# Patient Record
Sex: FEMALE | Race: WHITE | NOT HISPANIC OR LATINO
[De-identification: names, ages, dates, MRNs, and addresses within clinical notes are randomized per-mention and may not be internally consistent; named-entity substitution may affect disease eponyms.]

---

## 2018-02-06 ENCOUNTER — APPOINTMENT (OUTPATIENT)
Dept: UROLOGY | Facility: CLINIC | Age: 73
End: 2018-02-06
Payer: MEDICARE

## 2018-02-06 VITALS
DIASTOLIC BLOOD PRESSURE: 78 MMHG | WEIGHT: 168 LBS | HEIGHT: 62 IN | SYSTOLIC BLOOD PRESSURE: 166 MMHG | BODY MASS INDEX: 30.91 KG/M2 | HEART RATE: 68 BPM

## 2018-02-06 DIAGNOSIS — K21.9 GASTRO-ESOPHAGEAL REFLUX DISEASE W/OUT ESOPHAGITIS: ICD-10-CM

## 2018-02-06 DIAGNOSIS — R31.29 OTHER MICROSCOPIC HEMATURIA: ICD-10-CM

## 2018-02-06 DIAGNOSIS — R35.0 FREQUENCY OF MICTURITION: ICD-10-CM

## 2018-02-06 DIAGNOSIS — Z78.9 OTHER SPECIFIED HEALTH STATUS: ICD-10-CM

## 2018-02-06 LAB
BILIRUB UR QL STRIP: NORMAL
CLARITY UR: CLEAR
COLLECTION METHOD: NORMAL
GLUCOSE UR-MCNC: NORMAL
HCG UR QL: NORMAL EU/DL
HGB UR QL STRIP.AUTO: NORMAL
KETONES UR-MCNC: NORMAL
LEUKOCYTE ESTERASE UR QL STRIP: 75
NITRITE UR QL STRIP: NORMAL
PH UR STRIP: 5
PROT UR STRIP-MCNC: NORMAL
SP GR UR STRIP: 1.01

## 2018-02-06 PROCEDURE — 81003 URINALYSIS AUTO W/O SCOPE: CPT | Mod: QW

## 2019-03-12 ENCOUNTER — APPOINTMENT (OUTPATIENT)
Dept: CARDIOLOGY | Facility: CLINIC | Age: 74
End: 2019-03-12

## 2019-03-12 VITALS
HEIGHT: 62 IN | BODY MASS INDEX: 33.13 KG/M2 | DIASTOLIC BLOOD PRESSURE: 76 MMHG | WEIGHT: 180 LBS | SYSTOLIC BLOOD PRESSURE: 150 MMHG | HEART RATE: 70 BPM

## 2019-03-12 RX ORDER — AMLODIPINE BESYLATE AND BENAZEPRIL HYDROCHLORIDE 10; 40 MG/1; MG/1
CAPSULE ORAL
Refills: 0 | Status: DISCONTINUED | COMMUNITY
End: 2019-03-12

## 2019-03-12 RX ORDER — ATENOLOL 50 MG/1
TABLET ORAL
Refills: 0 | Status: DISCONTINUED | COMMUNITY
End: 2019-03-12

## 2019-04-01 ENCOUNTER — APPOINTMENT (OUTPATIENT)
Dept: CARDIOLOGY | Facility: CLINIC | Age: 74
End: 2019-04-01
Payer: MEDICARE

## 2019-04-01 ENCOUNTER — APPOINTMENT (OUTPATIENT)
Dept: CARDIOLOGY | Facility: CLINIC | Age: 74
End: 2019-04-01

## 2019-04-01 PROCEDURE — 93306 TTE W/DOPPLER COMPLETE: CPT

## 2019-06-25 ENCOUNTER — APPOINTMENT (OUTPATIENT)
Dept: CARDIOLOGY | Facility: CLINIC | Age: 74
End: 2019-06-25
Payer: MEDICARE

## 2019-06-25 VITALS
BODY MASS INDEX: 33.13 KG/M2 | HEIGHT: 62 IN | HEART RATE: 66 BPM | WEIGHT: 180 LBS | DIASTOLIC BLOOD PRESSURE: 66 MMHG | SYSTOLIC BLOOD PRESSURE: 130 MMHG

## 2019-06-25 PROCEDURE — 93000 ELECTROCARDIOGRAM COMPLETE: CPT

## 2019-06-25 PROCEDURE — 99213 OFFICE O/P EST LOW 20 MIN: CPT

## 2019-07-01 ENCOUNTER — RX RENEWAL (OUTPATIENT)
Age: 74
End: 2019-07-01

## 2019-07-03 NOTE — ADDENDUM
[FreeTextEntry1] : Scheduled for EGD.\par \par Low risk patient for perioperative cardiac events (RCRI = 0).\par Low risk procedure.\par No cardiac decompensation.  Functional capacity > 4 METS without angina.\par Recent reassuring ECHO as above.\par \par No further cardiac testing required prior to EGD.\par Cont Bisoprolol and antihypertensives perioperatively.

## 2019-07-03 NOTE — DISCUSSION/SUMMARY
[FreeTextEntry1] : Cont Amlodipine-Benazepril.\par Cont Bisoprolol.\par Cont HCTZ.\par Weight loss / exercise.\par Follow-up 6-months.

## 2019-07-03 NOTE — REASON FOR VISIT
[Follow-Up - Clinic] : a clinic follow-up of [Hypertension] : hypertension [FreeTextEntry1] : Feels well.\par \par No angina.  Breathing comfortable.  No palpitations, lightheadedness, syncope.\par \par ECHO (4/1/19): nL LVSF.  No valve disease.\par \par Labs (4/10/19):\par \par CBC / CMP otherwise unremarkable.\par   HDL 58

## 2019-12-03 ENCOUNTER — APPOINTMENT (OUTPATIENT)
Dept: CARDIOLOGY | Facility: CLINIC | Age: 74
End: 2019-12-03

## 2020-02-11 ENCOUNTER — APPOINTMENT (OUTPATIENT)
Dept: CARDIOLOGY | Facility: CLINIC | Age: 75
End: 2020-02-11
Payer: MEDICARE

## 2020-02-11 VITALS
DIASTOLIC BLOOD PRESSURE: 70 MMHG | WEIGHT: 182 LBS | HEART RATE: 71 BPM | BODY MASS INDEX: 33.49 KG/M2 | HEIGHT: 62 IN | SYSTOLIC BLOOD PRESSURE: 126 MMHG

## 2020-02-11 PROCEDURE — 99213 OFFICE O/P EST LOW 20 MIN: CPT

## 2020-02-11 PROCEDURE — 93000 ELECTROCARDIOGRAM COMPLETE: CPT

## 2020-02-17 NOTE — REASON FOR VISIT
[Hypertension] : hypertension [Follow-Up - Clinic] : a clinic follow-up of [FreeTextEntry1] : Feels well.\par \par No angina.  Breathing comfortable.  No palpitations, lightheadedness, syncope.\par \par Had EGD.  Acid Rx adjusted.  Following with GI.\par \par Labs reviewed (1/14/20):\par \par Cr 1.04\par     HDL 52\par CBC / CMP otherwise unremarkable.

## 2020-05-18 ENCOUNTER — TRANSCRIPTION ENCOUNTER (OUTPATIENT)
Age: 75
End: 2020-05-18

## 2020-08-11 ENCOUNTER — APPOINTMENT (OUTPATIENT)
Dept: CARDIOLOGY | Facility: CLINIC | Age: 75
End: 2020-08-11

## 2020-11-02 ENCOUNTER — INPATIENT (INPATIENT)
Facility: HOSPITAL | Age: 75
LOS: 2 days | Discharge: HOME HEALTH | End: 2020-11-05
Attending: INTERNAL MEDICINE | Admitting: INTERNAL MEDICINE
Payer: MEDICARE

## 2020-11-02 VITALS
DIASTOLIC BLOOD PRESSURE: 89 MMHG | OXYGEN SATURATION: 97 % | SYSTOLIC BLOOD PRESSURE: 192 MMHG | HEART RATE: 119 BPM | TEMPERATURE: 97 F | WEIGHT: 175.05 LBS | RESPIRATION RATE: 24 BRPM | HEIGHT: 62 IN

## 2020-11-02 LAB
ALBUMIN SERPL ELPH-MCNC: 4.1 G/DL — SIGNIFICANT CHANGE UP (ref 3.5–5.2)
ALP SERPL-CCNC: 67 U/L — SIGNIFICANT CHANGE UP (ref 30–115)
ALT FLD-CCNC: 38 U/L — SIGNIFICANT CHANGE UP (ref 0–41)
ANION GAP SERPL CALC-SCNC: 12 MMOL/L — SIGNIFICANT CHANGE UP (ref 7–14)
AST SERPL-CCNC: 46 U/L — HIGH (ref 0–41)
BASE EXCESS BLDV CALC-SCNC: -1.3 MMOL/L — SIGNIFICANT CHANGE UP (ref -2–2)
BASOPHILS # BLD AUTO: 0.07 K/UL — SIGNIFICANT CHANGE UP (ref 0–0.2)
BASOPHILS NFR BLD AUTO: 0.5 % — SIGNIFICANT CHANGE UP (ref 0–1)
BILIRUB SERPL-MCNC: 0.4 MG/DL — SIGNIFICANT CHANGE UP (ref 0.2–1.2)
BUN SERPL-MCNC: 24 MG/DL — HIGH (ref 10–20)
CA-I SERPL-SCNC: 1.26 MMOL/L — SIGNIFICANT CHANGE UP (ref 1.12–1.3)
CALCIUM SERPL-MCNC: 9.6 MG/DL — SIGNIFICANT CHANGE UP (ref 8.5–10.1)
CHLORIDE SERPL-SCNC: 102 MMOL/L — SIGNIFICANT CHANGE UP (ref 98–110)
CK MB CFR SERPL CALC: 2.8 NG/ML — SIGNIFICANT CHANGE UP (ref 0.6–6.3)
CK SERPL-CCNC: 35 U/L — SIGNIFICANT CHANGE UP (ref 0–225)
CO2 SERPL-SCNC: 23 MMOL/L — SIGNIFICANT CHANGE UP (ref 17–32)
CREAT SERPL-MCNC: 0.9 MG/DL — SIGNIFICANT CHANGE UP (ref 0.7–1.5)
D DIMER BLD IA.RAPID-MCNC: 4493 NG/ML DDU — HIGH (ref 0–230)
EOSINOPHIL # BLD AUTO: 0.07 K/UL — SIGNIFICANT CHANGE UP (ref 0–0.7)
EOSINOPHIL NFR BLD AUTO: 0.5 % — SIGNIFICANT CHANGE UP (ref 0–8)
GAS PNL BLDV: 143 MMOL/L — SIGNIFICANT CHANGE UP (ref 136–145)
GAS PNL BLDV: SIGNIFICANT CHANGE UP
GAS PNL BLDV: SIGNIFICANT CHANGE UP
GLUCOSE SERPL-MCNC: 203 MG/DL — HIGH (ref 70–99)
HCO3 BLDV-SCNC: 26 MMOL/L — SIGNIFICANT CHANGE UP (ref 22–29)
HCT VFR BLD CALC: 44.9 % — SIGNIFICANT CHANGE UP (ref 37–47)
HCT VFR BLDA CALC: 46.1 % — HIGH (ref 34–44)
HGB BLD CALC-MCNC: 15 G/DL — SIGNIFICANT CHANGE UP (ref 14–18)
HGB BLD-MCNC: 14 G/DL — SIGNIFICANT CHANGE UP (ref 12–16)
IMM GRANULOCYTES NFR BLD AUTO: 2.9 % — HIGH (ref 0.1–0.3)
LACTATE BLDV-MCNC: 3.6 MMOL/L — HIGH (ref 0.5–1.6)
LACTATE SERPL-SCNC: 2.3 MMOL/L — HIGH (ref 0.7–2)
LYMPHOCYTES # BLD AUTO: 1.68 K/UL — SIGNIFICANT CHANGE UP (ref 1.2–3.4)
LYMPHOCYTES # BLD AUTO: 13.1 % — LOW (ref 20.5–51.1)
MCHC RBC-ENTMCNC: 28.2 PG — SIGNIFICANT CHANGE UP (ref 27–31)
MCHC RBC-ENTMCNC: 31.2 G/DL — LOW (ref 32–37)
MCV RBC AUTO: 90.5 FL — SIGNIFICANT CHANGE UP (ref 81–99)
MONOCYTES # BLD AUTO: 0.53 K/UL — SIGNIFICANT CHANGE UP (ref 0.1–0.6)
MONOCYTES NFR BLD AUTO: 4.1 % — SIGNIFICANT CHANGE UP (ref 1.7–9.3)
NEUTROPHILS # BLD AUTO: 10.11 K/UL — HIGH (ref 1.4–6.5)
NEUTROPHILS NFR BLD AUTO: 78.9 % — HIGH (ref 42.2–75.2)
NRBC # BLD: 0 /100 WBCS — SIGNIFICANT CHANGE UP (ref 0–0)
NT-PROBNP SERPL-SCNC: 193 PG/ML — SIGNIFICANT CHANGE UP (ref 0–300)
NT-PROBNP SERPL-SCNC: 806 PG/ML — HIGH (ref 0–300)
PCO2 BLDV: 53 MMHG — HIGH (ref 41–51)
PH BLDV: 7.3 — SIGNIFICANT CHANGE UP (ref 7.26–7.43)
PLATELET # BLD AUTO: 198 K/UL — SIGNIFICANT CHANGE UP (ref 130–400)
PO2 BLDV: 30 MMHG — SIGNIFICANT CHANGE UP (ref 20–40)
POTASSIUM BLDV-SCNC: 3.4 MMOL/L — SIGNIFICANT CHANGE UP (ref 3.3–5.6)
POTASSIUM SERPL-MCNC: 5.1 MMOL/L — HIGH (ref 3.5–5)
POTASSIUM SERPL-SCNC: 5.1 MMOL/L — HIGH (ref 3.5–5)
PROT SERPL-MCNC: 7.1 G/DL — SIGNIFICANT CHANGE UP (ref 6–8)
RAPID RVP RESULT: SIGNIFICANT CHANGE UP
RBC # BLD: 4.96 M/UL — SIGNIFICANT CHANGE UP (ref 4.2–5.4)
RBC # FLD: 15.4 % — HIGH (ref 11.5–14.5)
SAO2 % BLDV: 45 % — SIGNIFICANT CHANGE UP
SARS-COV-2 RNA SPEC QL NAA+PROBE: SIGNIFICANT CHANGE UP
SODIUM SERPL-SCNC: 137 MMOL/L — SIGNIFICANT CHANGE UP (ref 135–146)
TROPONIN T SERPL-MCNC: 0.02 NG/ML — HIGH
TROPONIN T SERPL-MCNC: 0.04 NG/ML — CRITICAL HIGH
TROPONIN T SERPL-MCNC: <0.01 NG/ML — SIGNIFICANT CHANGE UP
WBC # BLD: 12.83 K/UL — HIGH (ref 4.8–10.8)
WBC # FLD AUTO: 12.83 K/UL — HIGH (ref 4.8–10.8)

## 2020-11-02 PROCEDURE — 99497 ADVNCD CARE PLAN 30 MIN: CPT | Mod: 25

## 2020-11-02 PROCEDURE — 99223 1ST HOSP IP/OBS HIGH 75: CPT

## 2020-11-02 PROCEDURE — 71045 X-RAY EXAM CHEST 1 VIEW: CPT | Mod: 26

## 2020-11-02 PROCEDURE — 99291 CRITICAL CARE FIRST HOUR: CPT | Mod: GC

## 2020-11-02 PROCEDURE — 93010 ELECTROCARDIOGRAM REPORT: CPT | Mod: 77

## 2020-11-02 PROCEDURE — 93010 ELECTROCARDIOGRAM REPORT: CPT | Mod: 76

## 2020-11-02 RX ORDER — LIPASE/PROTEASE/AMYLASE 16-48-48K
2 CAPSULE,DELAYED RELEASE (ENTERIC COATED) ORAL
Refills: 0 | Status: DISCONTINUED | OUTPATIENT
Start: 2020-11-02 | End: 2020-11-02

## 2020-11-02 RX ORDER — ENOXAPARIN SODIUM 100 MG/ML
40 INJECTION SUBCUTANEOUS DAILY
Refills: 0 | Status: DISCONTINUED | OUTPATIENT
Start: 2020-11-02 | End: 2020-11-02

## 2020-11-02 RX ORDER — ENOXAPARIN SODIUM 100 MG/ML
80 INJECTION SUBCUTANEOUS
Refills: 0 | Status: DISCONTINUED | OUTPATIENT
Start: 2020-11-03 | End: 2020-11-03

## 2020-11-02 RX ORDER — FUROSEMIDE 40 MG
40 TABLET ORAL ONCE
Refills: 0 | Status: COMPLETED | OUTPATIENT
Start: 2020-11-02 | End: 2020-11-02

## 2020-11-02 RX ORDER — CHLORHEXIDINE GLUCONATE 213 G/1000ML
1 SOLUTION TOPICAL
Refills: 0 | Status: DISCONTINUED | OUTPATIENT
Start: 2020-11-02 | End: 2020-11-05

## 2020-11-02 RX ORDER — LANSOPRAZOLE 15 MG/1
15 CAPSULE, DELAYED RELEASE ORAL DAILY
Refills: 0 | Status: DISCONTINUED | OUTPATIENT
Start: 2020-11-02 | End: 2020-11-02

## 2020-11-02 RX ORDER — ENOXAPARIN SODIUM 100 MG/ML
40 INJECTION SUBCUTANEOUS ONCE
Refills: 0 | Status: COMPLETED | OUTPATIENT
Start: 2020-11-02 | End: 2020-11-02

## 2020-11-02 RX ORDER — LIPASE/PROTEASE/AMYLASE 16-48-48K
6 CAPSULE,DELAYED RELEASE (ENTERIC COATED) ORAL
Refills: 0 | Status: DISCONTINUED | OUTPATIENT
Start: 2020-11-02 | End: 2020-11-05

## 2020-11-02 RX ORDER — FUROSEMIDE 40 MG
40 TABLET ORAL
Refills: 0 | Status: DISCONTINUED | OUTPATIENT
Start: 2020-11-02 | End: 2020-11-03

## 2020-11-02 RX ORDER — AMLODIPINE BESYLATE 2.5 MG/1
5 TABLET ORAL DAILY
Refills: 0 | Status: DISCONTINUED | OUTPATIENT
Start: 2020-11-02 | End: 2020-11-05

## 2020-11-02 RX ORDER — PANTOPRAZOLE SODIUM 20 MG/1
40 TABLET, DELAYED RELEASE ORAL
Refills: 0 | Status: DISCONTINUED | OUTPATIENT
Start: 2020-11-02 | End: 2020-11-05

## 2020-11-02 RX ORDER — ALPRAZOLAM 0.25 MG
0.25 TABLET ORAL DAILY
Refills: 0 | Status: DISCONTINUED | OUTPATIENT
Start: 2020-11-02 | End: 2020-11-05

## 2020-11-02 RX ORDER — METOPROLOL TARTRATE 50 MG
25 TABLET ORAL DAILY
Refills: 0 | Status: DISCONTINUED | OUTPATIENT
Start: 2020-11-02 | End: 2020-11-05

## 2020-11-02 RX ORDER — LISINOPRIL 2.5 MG/1
20 TABLET ORAL DAILY
Refills: 0 | Status: DISCONTINUED | OUTPATIENT
Start: 2020-11-02 | End: 2020-11-05

## 2020-11-02 RX ADMIN — Medication 0.25 MILLIGRAM(S): at 21:20

## 2020-11-02 RX ADMIN — Medication 25 MILLIGRAM(S): at 17:15

## 2020-11-02 RX ADMIN — Medication 40 MILLIGRAM(S): at 10:39

## 2020-11-02 RX ADMIN — Medication 6 CAPSULE(S): at 18:26

## 2020-11-02 RX ADMIN — Medication 40 MILLIGRAM(S): at 17:14

## 2020-11-02 RX ADMIN — ENOXAPARIN SODIUM 40 MILLIGRAM(S): 100 INJECTION SUBCUTANEOUS at 17:14

## 2020-11-02 RX ADMIN — LISINOPRIL 20 MILLIGRAM(S): 2.5 TABLET ORAL at 17:15

## 2020-11-02 NOTE — ED PROVIDER NOTE - CARE PLAN
Principal Discharge DX:	Respiratory distress  Secondary Diagnosis:	Hypoxia  Secondary Diagnosis:	Fluid overload

## 2020-11-02 NOTE — H&P ADULT - ASSESSMENT
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  Pt. reports insidious inset progressively worsening SOB. No inciting factors noted. Pt. reports SOB worse on exertion and currently gets SOB walking up stairs or walking a quarter of a block. Pt. sleeps with two pillows secondary to GERD, no recent changes. Pt. denies chest pain, LE swelling, fever, cough. Pt. went to old cardiologist 3 days ago who prescribed oral lasix which did not improve syptoms. Today Pt. felt more SOB with increased work of breathing and called EMS. Of note Pt. missed morning lasix dose.  With EMS Pt. satting 80s improved to 97% on 3 L NC. In ED Pt. afebrile, , /89 chest xray with mild vascular congestion pending official read  trop neg, Lactate 3.8.   Pt. was placed n BiPAP and give 40mg IV lasix with significant improvement of symptoms. repeat HR 97 /82. Pt. admitted for further management.     #SOB -worse on exertion, vascular congestion on Xray responded to IV lasix, and improving with BiPAP likely new onset CHF unknown trigger. NO cough or fever doubt PNA, WBC 12 likely from chronic steroids.    -s/p 40 mg lasix IV, will c/w lasix 40mg BID, currently on BiPAP - can switch to NC with O2 goal above 92%  -strict Is and Os, dailt weights  -TTE  - trop neg will trend, EKG sinus tachycardia, ddimer,   -f/u official xray read  -Dr. Haile consult    #Hx of autoimmune pancreatitis diagnosed August 2020  -currently on prednisone taper on 30 mg prednisone, in past Pt. did not tolerate decreasing dose and is planned for biologic in future with GI -can f/u out pt. If Pt. will remain on daily steroids will likely need ppx   -c/w creon    #HTN-c/w home meds switching bisoprolol for metoprolol succinate    #Gerd on prevacid    #DVT ppx- lovenox  #GI ppx- on prevacid

## 2020-11-02 NOTE — H&P ADULT - ATTENDING COMMENTS
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 Worse with exertion. Denies any CP, LE swelling, fevers/chills, recent travel or surgeries. When the EMS arrived the pt O2 was in the 80s, started on NC. In the ED, Chest X-Ray negative. BNP negative. Started on BiPAP and given IV LASix with minimal improvement in symptoms, deescalated to NC.     Physical exam shows pt in NAD. VSS, afebrile, not hypoxic on 3L NC. A&Ox3. Non-focal neuro exam. Muscle strength/sensation intact. CTA B/L with no W/C/R. RRR, no M/G/R. ABD is soft and non-tender, normoactive BSs; ostomy bag in place. LEs with trace pitting edema B/L/. No calf tenderness and homans is negative. No rashes. Labs and radiology as above.     Dyspnea, concern for possible PE, less likely new-onset HF. Spoke with radiology and ordered a STAT CTA-chest. Echo. TSH. Monitor daily weights, Is&Os, and diet/fluid restriction. Cardio consulted by day team. Supplemental O2 PRN. Restart home meds.     Hyperglycemia likely from daily steroids. A1c. FSs. Insulin PRN.     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 Restart home meds. DVT PPX. Inform PCP of pt's admission to hospital. My note supersedes the residents note.

## 2020-11-02 NOTE — ED PROVIDER NOTE - OBJECTIVE STATEMENT
75 y f, pmh of htn, ulcerative colitis, autoimmune pancreatitis, new onset CHF started on lasix three days ago, pw sob. Endorses having sob for past week. This morning, sob became very severe w increased work of breathing.  Pt is sating in high 80s in the ambulance and 97% on 3 L nasal cannula. Denies cough, fever, cp. Denies, n/v, dizziness, loc, abd pain.  Of note, pt. did not take her lasix this morning b/c she has an appointment for an echo today. 76yo F, pmh of htn, ulcerative colitis s/p total colectomy and ileostomy, autoimmune pancreatitis on prednisone 30mg, and LE edema started on lasix three days ago, pw sob. Endorses having sob for past ten days, progressively worsened today with increased FERRARI. This morning, sob became very severe w increased work of breathing.  Pt is sating in high 80s in the ambulance and 97% on 3 L nasal cannula. Denies cough, fever, cp. Denies, n/v, dizziness, loc, abd pain.  Of note, pt. did not take her lasix this morning b/c she had an appointment for an echo today. Former smoker. +Covid antibody a few months ago,  had covid and passed away.

## 2020-11-02 NOTE — H&P ADULT - NSHPREVIEWOFSYSTEMS_GEN_ALL_CORE
CONSTITUTIONAL: No weakness, fevers or chills  EYES/ENT: No visual changes;  No vertigo or throat pain   NECK: No pain or stiffness  RESPIRATORY: No cough, wheezing, hemoptysis; + shortness of breath  CARDIOVASCULAR: No chest pain or palpitations  GASTROINTESTINAL: No abdominal or epigastric pain. No nausea, vomiting, or hematemesis; No diarrhea or constipation. No melena or hematochezia.  GENITOURINARY: No dysuria, frequency or hematuria  NEUROLOGICAL: No numbness or weakness  SKIN: No itching, rashes

## 2020-11-02 NOTE — ED PROVIDER NOTE - NS ED ROS FT
Eyes:  No visual changes, eye pain or discharge.  ENMT:  No hearing changes, pain, no sore throat or runny nose, no difficulty swallowing  Cardiac:  No chest pain  Respiratory:  Endorses sob and increased work of breathing.   GI:  No nausea, vomiting, diarrhea or abdominal pain.  :  No dysuria, frequency or burning.  MS:  No myalgia, muscle weakness, joint pain or back pain.  Neuro:  No headache or weakness.  No LOC.  Skin:  No skin rash.   Endocrine: No history of thyroid disease or diabetes.

## 2020-11-02 NOTE — H&P ADULT - NSICDXPASTMEDICALHX_GEN_ALL_CORE_FT
PAST MEDICAL HISTORY:  Autoimmune pancreatitis     Chronic GERD     H/O ulcerative colitis     HTN (hypertension)

## 2020-11-02 NOTE — CONSULT NOTE ADULT - ASSESSMENT
IMPRESSION  - NSTEMI (trop trend .01 -->  .04 -->  .02;  no active chest pain;  likely demand from hypoxic rf)  - Elevated D-Dimer 4493, proBNP 193, relatively nl CXR  - Hypoxic RF (85% on ra on admission;  no effusion on xray,  however does have b/l basilar crackles on exam)      PLAN  - r/o pe + f/u CTA once completed  - check TTE  - can decrease lasix iv to 40mg once daily instead  - strict i/o and trend renal function.    - re-eval volume status and pulmonary status in daily and change to oral lasix upon improvement of exam findings and/or symptoms.   - cw a/c for now until CTA results.  (can dc a/c if no pe).    IMPRESSION  - NSTEMI (trop trend .01 -->  .04 -->  .02;  no active chest pain;  likely demand from hypoxic rf)  - Elevated D-Dimer 4493, proBNP 193, relatively nl CXR  - Hypoxic RF (85% on ra on admission;  no effusion on xray,  however does have b/l basilar crackles on exam)  - Acute B/L PE      PLAN  - r/o pe + f/u CTA once completed  - check TTE  - strict i/o and trend renal function.    - re-eval volume status and pulmonary status in daily and change to oral lasix upon improvement of exam findings and/or symptoms.   - cw a/c for now until CTA results.  (can dc a/c if no pe).   - ok to dc lasix.

## 2020-11-02 NOTE — CHART NOTE - NSCHARTNOTEFT_GEN_A_CORE
Patient noted to have increasing oxygen requirements with positive cardiac markers. She was given two doses of lasix with appropriate urine output. However, shortness of breath failed to diminish and she was still requiring supplemental oxygen. Given D-dimer of 4500, BNP increase from 200 to over 800 and troponin peak of 0.04 high suspicion of PE present. CTA ordered but unable to be obtained urgently due to numerous other urgent cases in line for CT scan.     Conversation had with the patient regarding the risks and benefits of treatment and the risks and benefits of not doing anything. Patient agreed to therapeutic anticoagulation.    Additional Lovenox 40mg ordered up for now to meet 1mg/kg dosing and will treat with lovenox 80mg BID starting in the AM. Patient noted to have increasing oxygen requirements with positive cardiac markers. She was given two doses of lasix with appropriate urine output. However, shortness of breath failed to diminish and she was still requiring supplemental oxygen. Given D-dimer of 4500, BNP increase from 200 to over 800 and troponin peak of 0.04 high suspicion of PE present. CTA ordered but unable to be obtained urgently due to numerous other urgent cases in line for CT scan.     Conversation had with the patient regarding the risks and benefits of treatment and the risks and benefits of not doing anything. Patient agreed to therapeutic anticoagulation.    Additional Lovenox 40mg ordered up for now to meet 1mg/kg dosing and will treat with lovenox 80mg BID starting in the AM.      Attending attestation:   -Agree with above. Pt understands risks vs benefits and elects to start on therapeutic lovenox prior to CTA-chest due to high suspicion of PE.

## 2020-11-02 NOTE — ED ADULT TRIAGE NOTE - CHIEF COMPLAINT QUOTE
1.5 weeks SOB started on lasix 20mg missed her dose, scheduled for ECHO today. initially 85% on room air. placed on 3L 97% 76 yo F BIBA after 1.5 weeks SOB, started on lasix 20mg missed her dose today, scheduled for ECHO today. initially 85% on room air. placed on 3L 97%. tachycardic and tachypneic upon arrival.

## 2020-11-02 NOTE — H&P ADULT - NSHPPHYSICALEXAM_GEN_ALL_CORE
GENERAL: NAD, speaks in full sentences, no signs of respiratory distress  HEAD:  Atraumatic, Normocephalic  EYES: EOMI, PERRLA, non-icteric, no injected sclera  NECK: Supple, No JVD  CHEST/LUNG: b/l crackles at bases; No accessory muscles used  HEART: Tachycardic No murmurs;   ABDOMEN: Soft, Nontender, ilieostomy with clean base  EXTREMITIES:  2+ Peripheral Pulses, No cyanosis or edema  PSYCH: AAOx3  NEUROLOGY: non-focal  SKIN: No rashes or lesions

## 2020-11-02 NOTE — ED PROVIDER NOTE - CLINICAL SUMMARY MEDICAL DECISION MAKING FREE TEXT BOX
74 Y/O F PMHX AS DOCUMENTED WITH 10 DAYS OF SOB. PT WITH PULMONARY EDEMA. CLINICALLY IMPROVED WITH DIURESIS AND NIPPV. CXR REVIEWED. ALL DIAGNOSTIC TESTING REVIEWED. PT ADMITTED TO MEDICINE.

## 2020-11-02 NOTE — CONSULT NOTE ADULT - SUBJECTIVE AND OBJECTIVE BOX
Outpt cardiologist:  Dr. Dong / Dr. 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  Pt. reports insidious inset progressively worsening SOB. No inciting factors noted. Pt. reports SOB worse on exertion and currently gets SOB walking up stairs or walking a quarter of a block. Pt. sleeps with two pillows secondary to GERD, no recent changes. Pt. denies chest pain, LE swelling, fever, cough. Pt. went to old cardiologist 3 days ago who prescribed oral lasix which did not improve symptoms Today Pt. felt more SOB with increased work of breathing and called EMS. Of note Pt. missed morning lasix dose.  With EMS Pt. satting 80s improved to 97% on 3 L NC. In ED Pt. afebrile, , /89 chest xray with mild vascular congestion pending official read  trop neg, Lactate 3.8.   Pt. was placed n BiPAP and give 40mg IV lasix with significant improvement of symptoms. repeat HR 97 /82. Pt. admitted for further management.  (2020 13:24)    ----  cardio additions to above:  - primary symptom of zaman started insidiously ~1.5 weeks prior to presentation.  always on exertion,  but now dyspneic upon less exertion than initially  - denied chest pain / pressure;  no noticeable le heaviness or edema  - occasional 'fluttering' sensation in chest.    - recently was prescribed lasix outpt few days ptp, without improvement in dyspneic symptoms.   - only medication change was addition of prednisone ~2mo ago for autoimmune pancreatitis.       PAST MEDICAL & SURGICAL HISTORY  HTN (hypertension)    Chronic GERD    Autoimmune pancreatitis    H/O ulcerative colitis        FAMILY HISTORY:  FAMILY HISTORY:      SOCIAL HISTORY:  Social History:  20 pack year smoking hx quit 40 years ago, no etoh (2020 13:24)      ALLERGIES:  Ceftin (Anaphylaxis)  Cipro (Anaphylaxis)  sulfa drugs (Rash)      MEDICATIONS:  ALPRAZolam 0.25 milliGRAM(s) Oral daily PRN  amLODIPine   Tablet 5 milliGRAM(s) Oral daily  chlorhexidine 4% Liquid 1 Application(s) Topical <User Schedule>  enoxaparin Injectable 40 milliGRAM(s) SubCutaneous once  furosemide   Injectable 40 milliGRAM(s) IV Push two times a day  lisinopril 20 milliGRAM(s) Oral daily  metoprolol succinate ER 25 milliGRAM(s) Oral daily  pancrelipase  (CREON 12,000 Lipase Units) 6 Capsule(s) Oral three times a day with meals  pantoprazole    Tablet 40 milliGRAM(s) Oral before breakfast  predniSONE   Tablet 30 milliGRAM(s) Oral daily      HOME MEDICATIONS:  Home Medications:  ALPRAZolam 0.25 mg oral tablet: 1 tab(s) orally once a day, As Needed (2020 14:08)  amlodipine-benazepril 5 mg-20 mg oral capsule: 1 cap(s) orally once a day (2020 14:05)  bisoprolol 10 mg oral tablet: 1 tab(s) orally once a day (2020 14:06)  Creon 36,000 units oral delayed release capsule: 2 tab(s) orally 3 times a day (with meals) (2020 14:07)  predniSONE: 30 milligram(s) orally once a day (2020 14:05)  Prevacid 15 mg oral delayed release capsule: 1 cap(s) orally once a day (2020 14:07)      VITALS:   T(F): 97.7 ( @ 22:33), Max: 98.1 ( @ 20:58)  HR: 84 ( @ 22:33) (80 - 119)  BP: 150/79 ( @ 22:33) (145/79 - 192/89)  BP(mean): 108 ( @ 22:33) (108 - 108)  RR: 20 ( @ 22:33) (18 - 24)  SpO2: 98% ( @ 22:33) (96% - 100%)    I&O's Summary    2020 07:01  -  2020 23:37  --------------------------------------------------------  IN: 0 mL / OUT: 600 mL / NET: -600 mL        REVIEW OF SYSTEMS:  CONSTITUTIONAL: No weakness, fevers or chills  HEENT: No visual changes, neck/ear pain  RESPIRATORY: No cough. +sob.   CARDIOVASCULAR: no cp / pressure. occasional palpitations  GASTROINTESTINAL: No abdominal pain. No nausea, vomiting, diarrhea   GENITOURINARY: No dysuria, frequency or hematuria  NEUROLOGICAL: No new focal deficits  SKIN: No new rashes    PHYSICAL EXAM:  General: Not in distress.  Non-toxic appearing.   HEENT: EOMI  Cardio: Regular rate and rhythm, S1, S2, no murmur  Pulm: B/L BS.  No wheezing . mild b/l basilar crackles.   Abdomen: Soft, non-tender, non-distended. Normoactive bowel sounds  Extremities: No pitting edema b/l   Neuro: A&O x3. No focal deficits    LABS:                        14.0   12.83 )-----------( 198      ( 2020 10:23 )             44.9         137  |  102  |  24<H>  ----------------------------<  203<H>  5.1<H>   |  23  |  0.9    Ca    9.6      2020 10:23    TPro  7.1  /  Alb  4.1  /  TBili  0.4  /  DBili  x   /  AST  46<H>  /  ALT  38  /  AlkPhos  67  11      Creatine Kinase, Serum: 35 U/L (20 @ 21:14)  Troponin T, Serum: 0.02 ng/mL <H> (20 @ 21:14)  Troponin T, Serum: 0.04 ng/mL <HH> (20 @ 16:46)  Lactate, Blood: 2.3 mmol/L <H> (20 @ 16:46)  Troponin T, Serum: <0.01 ng/mL (20 @ 10:23)    CARDIAC MARKERS ( 2020 21:14 )  x     / 0.02 ng/mL / 35 U/L / x     / 2.8 ng/mL  CARDIAC MARKERS ( 2020 16:46 )  x     / 0.04 ng/mL / x     / x     / x      CARDIAC MARKERS ( 2020 10:23 )  x     / <0.01 ng/mL / x     / x     / x            Troponin trend:    Serum Pro-Brain Natriuretic Peptide: 806 pg/mL (20 @ 21:14)  Serum Pro-Brain Natriuretic Peptide: 193 pg/mL (20 @ 10:23)          RADIOLOGY:  -CXR:  < from: Xray Chest 1 View-PORTABLE IMMEDIATE (20 @ 13:48) >  No radiographic evidence of acute cardiopulmonary disease.    < end of copied text >  -TTE:  2010:  ef >55%. mild lvh.   -CCTA:  -STRESS TEST:  -CATHETERIZATION:  -OTHER:  EC Lead ECG:   Ventricular Rate 80 BPM    Atrial Rate 80 BPM    P-R Interval 154 ms    QRS Duration 82 ms    Q-T Interval 398 ms    QTC Calculation(Bazett) 459 ms    P Axis 34 degrees    R Axis 29 degrees    T Axis 25 degrees    Diagnosis Line Normal sinus rhythm  Septal infarct , age undetermined  Abnormal ECG    Confirmed by AMAN SCHROEDER MD ([id number]) on 2020 9:10:52 PM ( @ 17:00)      TELEMETRY EVENTS:

## 2020-11-02 NOTE — ED PROVIDER NOTE - ATTENDING CONTRIBUTION TO CARE
I personally evaluated the patient. I reviewed the Resident’s or Physician Assistant’s note (as assigned above), and agree with the findings and plan except as documented in my note.   74 Y/O F HTN, UC S/P TOTAL COLECTOMY AND ILEOSTOMY, AUTOIMMUNE PANCREATITIS ON PREDNISONE DAILY, FORMER SMOKER, WITH 10 DAYS OF FERRARI, PROGRESSIVELY WORSENING. PT SEEN BY PMD 3 DAYS AGO AND PRESCRIBED LASIX WITHOUT RELIEF. NO COUGH. NO CP. NO FEVER, CHILLS. PT WITH COVID + AB. NO ABD PAIN, N/V/D. NORMAL URINATION. VITALS NOTED. ALERT OX3 +MILD RESP DISTRESS. + TACHYPNEA. + DYSPNEA WITH MINIMAL EXERTION. NCAT PERRL. EOMI. OP NORMAL. MMM. NECK SUPPLE. NO JVD. LUNGS WITH RHONCHI B/L. RRR. S1S2. ABD- SOFT NONTENDER. + B/L LEG EDEMA. CN 2-12 INTACT. NEURO EXAM NONFOCAL.

## 2020-11-02 NOTE — ED ADULT NURSE REASSESSMENT NOTE - NS ED NURSE REASSESS COMMENT FT1
Pt states she is feeling much better. Pt continues on BIPAP at this time without complaint. Pt no longer tachypneic. Pt urinating without difficulty; 600cc output. Continues on cardiac monitor and pulse ox. Comfort and safety maintained.

## 2020-11-02 NOTE — ED PROVIDER NOTE - PROGRESS NOTE DETAILS
Miranda- Patient improved after bipap/lasix. Increased work of breathing resolved. Afebrile, stable. Signed out to MAR. Miranda- Dr. Fernández is not admitting to the hospital yet, will admit to hospitalist.

## 2020-11-02 NOTE — H&P ADULT - NSHPLABSRESULTS_GEN_ALL_CORE
14.0   12.83 )-----------( 198      ( 02 Nov 2020 10:23 )             44.9     11-02    137  |  102  |  24<H>  ----------------------------<  203<H>  5.1<H>   |  23  |  0.9    Ca    9.6      02 Nov 2020 10:23    TPro  7.1  /  Alb  4.1  /  TBili  0.4  /  DBili  x   /  AST  46<H>  /  ALT  38  /  AlkPhos  67  11-02

## 2020-11-02 NOTE — ED ADULT NURSE NOTE - OBJECTIVE STATEMENT
Pt presents to ED with c/o shortness of breath. Pt states she was started on Lasix 4 days ago and did not take her dose this morning because she was going to an echocardiogram appointment and didn't want to urinate a lot. Pt denies fever, N/V/D, abdominal pain. Pt states she was never tested for COVID but has antibodies because her  passed away from COVID.

## 2020-11-02 NOTE — ED ADULT NURSE NOTE - CHIEF COMPLAINT QUOTE
74 yo F BIBA after 1.5 weeks SOB, started on lasix 20mg missed her dose today, scheduled for ECHO today. initially 85% on room air. placed on 3L 97%. tachycardic and tachypneic upon arrival.

## 2020-11-02 NOTE — H&P ADULT - HISTORY OF PRESENT ILLNESS
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  Pt. reports insideious inset progressivly worseing SOB. No incitnig factors nted. Pt. 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  Pt. reports insidious inset progressively worsening SOB. No inciting factors noted. Pt. reports SOB worse on exertion and currently gets SOB walking up stairs or walking a quarter of a block. Pt. sleeps with two pillows secondary to GERD, no recent changes. Pt. denies chest pain, LE swelling, fever, cough. Pt. went to old cardiologist 3 days ago who prescribed oral lasix which did not improve symptoms Today Pt. felt more SOB with increased work of breathing and called EMS. Of note Pt. missed morning lasix dose.  With EMS Pt. satting 80s improved to 97% on 3 L NC. In ED Pt. afebrile, , /89 chest xray with mild vascular congestion pending official read  trop neg, Lactate 3.8.   Pt. was placed n BiPAP and give 40mg IV lasix with significant improvement of symptoms. repeat HR 97 /82. Pt. admitted for further management.

## 2020-11-03 LAB
NT-PROBNP SERPL-SCNC: 819 PG/ML — HIGH (ref 0–300)
TROPONIN T SERPL-MCNC: <0.01 NG/ML — SIGNIFICANT CHANGE UP
TROPONIN T SERPL-MCNC: <0.01 NG/ML — SIGNIFICANT CHANGE UP

## 2020-11-03 PROCEDURE — 71275 CT ANGIOGRAPHY CHEST: CPT | Mod: 26

## 2020-11-03 RX ORDER — HEPARIN SODIUM 5000 [USP'U]/ML
800 INJECTION INTRAVENOUS; SUBCUTANEOUS
Qty: 25000 | Refills: 0 | Status: DISCONTINUED | OUTPATIENT
Start: 2020-11-03 | End: 2020-11-03

## 2020-11-03 RX ORDER — ENOXAPARIN SODIUM 100 MG/ML
80 INJECTION SUBCUTANEOUS EVERY 12 HOURS
Refills: 0 | Status: DISCONTINUED | OUTPATIENT
Start: 2020-11-03 | End: 2020-11-05

## 2020-11-03 RX ADMIN — PANTOPRAZOLE SODIUM 40 MILLIGRAM(S): 20 TABLET, DELAYED RELEASE ORAL at 07:03

## 2020-11-03 RX ADMIN — Medication 6 CAPSULE(S): at 13:17

## 2020-11-03 RX ADMIN — Medication 40 MILLIGRAM(S): at 05:18

## 2020-11-03 RX ADMIN — ENOXAPARIN SODIUM 80 MILLIGRAM(S): 100 INJECTION SUBCUTANEOUS at 17:58

## 2020-11-03 RX ADMIN — ENOXAPARIN SODIUM 40 MILLIGRAM(S): 100 INJECTION SUBCUTANEOUS at 01:16

## 2020-11-03 RX ADMIN — Medication 6 CAPSULE(S): at 08:17

## 2020-11-03 RX ADMIN — Medication 3 CAPSULE(S): at 17:59

## 2020-11-03 RX ADMIN — Medication 25 MILLIGRAM(S): at 05:18

## 2020-11-03 RX ADMIN — Medication 30 MILLIGRAM(S): at 05:17

## 2020-11-03 RX ADMIN — AMLODIPINE BESYLATE 5 MILLIGRAM(S): 2.5 TABLET ORAL at 05:18

## 2020-11-03 RX ADMIN — LISINOPRIL 20 MILLIGRAM(S): 2.5 TABLET ORAL at 05:18

## 2020-11-03 NOTE — PROGRESS NOTE ADULT - ASSESSMENT
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  In the ER further work up revealed Pulmonary Embolism with right sided heart strain. She is currently hemodynamically stable.    Dyspnea  - Initial scan shows Acute pulmonary emboli within the distal left and right main pulmonary arteries with additional lobar and segmental extension into all lung lobes. There was evidence of right heart strain; with RV LV ratio of 1.15. It shows lung ground glass attenuation, which may be attributed to pulmonary edema in the appropriate clinical setting.  - D-dimer 4500, BNP peak 900 and Troponin peak 0.04 on admission  - therapeutic lovenox  - IR aware of the patient; no urgent need for intervention. Dr. Murray and his team spoke to the patient and explained the plan from their perspective  - Will get Echocardiogram done and check lower extremity duplex to evaluate for clot burden    History of autoimmune pancreatitis (diagnosed August 2020)  -currently on prednisone taper on 30 mg prednisone. She did not tolerate decreasing dose and is planned for biologic in future with GI   -on creon    History of Hypertension  - Hemodynamically stable  - home meds switched from bisoprolol for metoprolol succinate    History of Gastroesophageal Refux  - on ppi    Full Code  DASH anti reflux diet  On lovenox  Comes from Home, Vent Unit for now

## 2020-11-03 NOTE — CONSULT NOTE ADULT - ASSESSMENT
IMPRESSION:    PE   HO autoimmune pancreatitis and UC   HO HTN     PLAN:    CNS: no depressants     HEENT: Oral care    PULMONARY:  HOB @ 45 degrees.  Aspiration precautions     CARDIOVASCULAR: I=O.  BP control.  ECHO  Hold Lasix     GI: GI prophylaxis.  Feeding.  Bowel regimen     RENAL:  Follow up lytes.  Correct as needed    INFECTIOUS DISEASE: Follow up cultures.     HEMATOLOGICAL:  DVT therapy with LMWH.  LE Duplex.  Will need Hypercoagulable state and age appropriate malignancy ZUNIGA as OP     ENDOCRINE:  Follow up FS.  Insulin protocol if needed.    MUSCULOSKELETAL:  Bed rest today     Possible transfer to floor pM          IMPRESSION:    PE   HO autoimmune pancreatitis and UC   Possible MEENA   HO HTN     PLAN:    CNS: no depressants     HEENT: Oral care    PULMONARY:  HOB @ 45 degrees.  Aspiration precautions     CARDIOVASCULAR: I=O.  BP control.  ECHO  Hold Lasix     GI: GI prophylaxis.  Feeding.  Bowel regimen     RENAL:  Follow up lytes.  Correct as needed    INFECTIOUS DISEASE: Follow up cultures.     HEMATOLOGICAL:  DVT therapy with LMWH.  LE Duplex.  Will need Hypercoagulable state and age appropriate malignancy ZUNIGA as OP     ENDOCRINE:  Follow up FS.  Insulin protocol if needed.    MUSCULOSKELETAL:  Bed rest today     Possible transfer to floor pM

## 2020-11-03 NOTE — CONSULT NOTE ADULT - SUBJECTIVE AND OBJECTIVE BOX
INTERVENTIONAL RADIOLOGY CONSULT:     Procedure Requested: Pulmonary thrombectomy    HPI:  75 year old female PMH of UC 1198 s/p total colectomy and ileostomy, Uterine Ca s/p hysterectomy HTN, GERD, autoimmune pancreatitis on daily steroids who presents for SOB.  Pt. reports insidious inset progressively worsening SOB. No inciting factors noted. Pt. reports SOB worse on exertion and currently gets SOB walking up stairs or walking a quarter of a block. Pt. sleeps with two pillows secondary to GERD, no recent changes. Pt. denies chest pain, LE swelling, fever, cough. Pt. went to old cardiologist 3 days ago who prescribed oral lasix which did not improve symptoms Today Pt. felt more SOB with increased work of breathing and called EMS. Of note Pt. missed morning lasix dose.  With EMS Pt. satting 80s improved to 97% on 3 L NC. In ED Pt. afebrile, , /89 chest xray with mild vascular congestion pending official read  trop neg, Lactate 3.8.   Pt. was placed n BiPAP and give 40mg IV lasix with significant improvement of symptoms. repeat HR 97 /82. Pt. admitted for further management.       PAST MEDICAL & SURGICAL HISTORY:  HTN (hypertension)  Chronic GERD  Autoimmune pancreatitis  H/O ulcerative colitis  Uterine CA, s/p hysterectomy    MEDICATIONS  (STANDING):  amLODIPine   Tablet 5 milliGRAM(s) Oral daily  chlorhexidine 4% Liquid 1 Application(s) Topical <User Schedule>  heparin  Infusion 800 Unit(s)/Hr (8 mL/Hr) IV Continuous <Continuous>  lisinopril 20 milliGRAM(s) Oral daily  metoprolol succinate ER 25 milliGRAM(s) Oral daily  pancrelipase  (CREON 12,000 Lipase Units) 6 Capsule(s) Oral three times a day with meals  pantoprazole    Tablet 40 milliGRAM(s) Oral before breakfast  predniSONE   Tablet 30 milliGRAM(s) Oral daily    MEDICATIONS  (PRN):  ALPRAZolam 0.25 milliGRAM(s) Oral daily PRN anxiety      Allergies:  Ceftin (Anaphylaxis)  Cipro (Anaphylaxis)  sulfa drugs (Rash)    Physical Exam:   Vital Signs Last 24 Hrs  T(C): 36.3 (03 Nov 2020 08:13), Max: 36.7 (02 Nov 2020 20:58)  T(F): 97.3 (03 Nov 2020 08:13), Max: 98.1 (02 Nov 2020 20:58)  HR: 76 (03 Nov 2020 08:13) (62 - 119)  BP: 143/79 (03 Nov 2020 08:13) (137/67 - 192/89)  BP(mean): 105 (03 Nov 2020 08:13) (98 - 108)  RR: 18 (03 Nov 2020 08:13) (16 - 24)  SpO2: 97% (03 Nov 2020 08:13) (96% - 100%)    Labs:                         14.0   12.83 )-----------( 198      ( 02 Nov 2020 10:23 )             44.9     11-02    137  |  102  |  24<H>  ----------------------------<  203<H>  5.1<H>   |  23  |  0.9    Ca    9.6      02 Nov 2020 10:23    TPro  7.1  /  Alb  4.1  /  TBili  0.4  /  DBili  x   /  AST  46<H>  /  ALT  38  /  AlkPhos  67  11-02        Pertinent labs:                      14.0   12.83 )-----------( 198      ( 02 Nov 2020 10:23 )             44.9       11-02    137  |  102  |  24<H>  ----------------------------<  203<H>  5.1<H>   |  23  |  0.9    Ca    9.6      02 Nov 2020 10:23    TPro  7.1  /  Alb  4.1  /  TBili  0.4  /  DBili  x   /  AST  46<H>  /  ALT  38  /  AlkPhos  67  11-02    Radiology & Additional Studies:   Radiology imaging reviewed.       ASSESSMENT/ PLAN:   76 yo F with pmhx of uterine CA s/p hysterectomy, autoimmune pancreatitis, and COVID pneumonia in February 2020. Patient presented with worsening SOB and was found to have bilateral distal main/proximal segmental pulmonary emboli which appeared subacute or chronic. Additionally, there was right heart strain. Troponins were 0.04, and subsequently down trended to 0.02. Patient was placed on 2L NC with significant improvement in respiratory status. She was started on heparin and admitted for further management. IR consulted for pulmonary thrombectomy.    Plan:  - Patient resting comfortably in bed with significant improvement in respiratory status  - Subacute/chronic appearing clot on CT  - Given patient's current status and appearance of emboli, recommend medical management for now  - Bilateral LE duplex  - Consider IVC filter if extensive LE clot burden  - Continue outpatient anticoagulation for at least 6 months    Risks, benefits, and alternatives to treatment discussed. All questions answered with understanding.    Thank you for the courtesy of this consult, please call [phone] with any further questions.    INTERVENTIONAL RADIOLOGY CONSULT:     Procedure Requested: Pulmonary thrombectomy    HPI:  75 year old female PMH of UC 1198 s/p total colectomy and ileostomy, Uterine Ca s/p hysterectomy HTN, GERD, autoimmune pancreatitis on daily steroids who presents for SOB.  Pt. reports insidious inset progressively worsening SOB. No inciting factors noted. Pt. reports SOB worse on exertion and currently gets SOB walking up stairs or walking a quarter of a block. Pt. sleeps with two pillows secondary to GERD, no recent changes. Pt. denies chest pain, LE swelling, fever, cough. Pt. went to old cardiologist 3 days ago who prescribed oral lasix which did not improve symptoms Today Pt. felt more SOB with increased work of breathing and called EMS. Of note Pt. missed morning lasix dose.  With EMS Pt. satting 80s improved to 97% on 3 L NC. In ED Pt. afebrile, , /89 chest xray with mild vascular congestion pending official read  trop neg, Lactate 3.8.   Pt. was placed n BiPAP and give 40mg IV lasix with significant improvement of symptoms. repeat HR 97 /82. Pt. admitted for further management.       PAST MEDICAL & SURGICAL HISTORY:  HTN (hypertension)  Chronic GERD  Autoimmune pancreatitis  H/O ulcerative colitis  Uterine CA, s/p hysterectomy    MEDICATIONS  (STANDING):  amLODIPine   Tablet 5 milliGRAM(s) Oral daily  chlorhexidine 4% Liquid 1 Application(s) Topical <User Schedule>  heparin  Infusion 800 Unit(s)/Hr (8 mL/Hr) IV Continuous <Continuous>  lisinopril 20 milliGRAM(s) Oral daily  metoprolol succinate ER 25 milliGRAM(s) Oral daily  pancrelipase  (CREON 12,000 Lipase Units) 6 Capsule(s) Oral three times a day with meals  pantoprazole    Tablet 40 milliGRAM(s) Oral before breakfast  predniSONE   Tablet 30 milliGRAM(s) Oral daily    MEDICATIONS  (PRN):  ALPRAZolam 0.25 milliGRAM(s) Oral daily PRN anxiety      Allergies:  Ceftin (Anaphylaxis)  Cipro (Anaphylaxis)  sulfa drugs (Rash)    Physical Exam:   Vital Signs Last 24 Hrs  T(C): 36.3 (03 Nov 2020 08:13), Max: 36.7 (02 Nov 2020 20:58)  T(F): 97.3 (03 Nov 2020 08:13), Max: 98.1 (02 Nov 2020 20:58)  HR: 76 (03 Nov 2020 08:13) (62 - 119)  BP: 143/79 (03 Nov 2020 08:13) (137/67 - 192/89)  BP(mean): 105 (03 Nov 2020 08:13) (98 - 108)  RR: 18 (03 Nov 2020 08:13) (16 - 24)  SpO2: 97% (03 Nov 2020 08:13) (96% - 100%)    Labs:                         14.0   12.83 )-----------( 198      ( 02 Nov 2020 10:23 )             44.9     11-02    137  |  102  |  24<H>  ----------------------------<  203<H>  5.1<H>   |  23  |  0.9    Ca    9.6      02 Nov 2020 10:23    TPro  7.1  /  Alb  4.1  /  TBili  0.4  /  DBili  x   /  AST  46<H>  /  ALT  38  /  AlkPhos  67  11-02        Pertinent labs:                      14.0   12.83 )-----------( 198      ( 02 Nov 2020 10:23 )             44.9       11-02    137  |  102  |  24<H>  ----------------------------<  203<H>  5.1<H>   |  23  |  0.9    Ca    9.6      02 Nov 2020 10:23    TPro  7.1  /  Alb  4.1  /  TBili  0.4  /  DBili  x   /  AST  46<H>  /  ALT  38  /  AlkPhos  67  11-02    Radiology & Additional Studies:   Radiology imaging reviewed.       ASSESSMENT/ PLAN:   74 yo F with pmhx of uterine CA s/p hysterectomy, autoimmune pancreatitis, and COVID pneumonia in February 2020. Patient presented with worsening SOB and was found to have bilateral distal main/proximal segmental pulmonary emboli which appeared subacute or chronic. Additionally, there was right heart strain. Troponins were 0.04, and subsequently down trended to 0.02. Patient was placed on 2L NC with significant improvement in respiratory status. She was started on heparin and admitted for further management. IR consulted for catheter directed treatement of pulmonary embolism.    Plan:  - Patient resting comfortably in bed with significant improvement in respiratory status( saturating 100% on 2L NC)  - Subacute/chronic distal appearing clot on CT  - Given patient's current status and appearance of emboli, recommend medical management for now  - Obtain official 2D echocardiogram to evaluate right heart function.   - Bilateral LE duplex  - Consider IVC filter placement if extensive LE clot burden  - Continue outpatient anticoagulation for at least 6 months    Risks, benefits, and alternatives to treatment discussed. All questions answered with understanding.    Thank you for the courtesy of this consult, please call [phone] with any further questions.

## 2020-11-03 NOTE — CONSULT NOTE ADULT - SUBJECTIVE AND OBJECTIVE BOX
Patient is a 75y old  Female who presents with a chief complaint of SOB (02 Nov 2020 23:36)      HPI:  75 year old female PMH of UC 1998 s/p total colectomy and ileostomy, Uterine Ca s/p hysterectomy HTN, GERD, autoimmune pancreatitis on daily steroids who presents for SOB.  Pt. reports insidious inset progressively worsening SOB. No inciting factors noted. Pt. reports SOB worse on exertion and currently gets SOB walking up stairs or walking a quarter of a block. Pt. sleeps with two pillows secondary to GERD, no recent changes. Pt. denies chest pain, LE swelling, fever, cough. Pt. went to old cardiologist 3 days ago who prescribed oral lasix which did not improve symptoms Today Pt. felt more SOB with increased work of breathing and called EMS. Of note Pt. missed morning lasix dose.  With EMS Pt. satting 80s improved to 97% on 3 L NC. In ED Pt. afebrile, , /89 chest xray with mild vascular congestion pending official read  trop neg, Lactate 3.8.   Pt. was placed n BiPAP and give 40mg IV lasix with significant improvement of symptoms. repeat HR 97 /82. Pt. admitted for further management.  (02 Nov 2020 13:24)      PAST MEDICAL & SURGICAL HISTORY:  HTN (hypertension)    Chronic GERD    Autoimmune pancreatitis    H/O ulcerative colitis        SOCIAL HX:   Smoking      no                   ETOH       occasional                      Other    FAMILY HISTORY:  :  No known cardiovacular family hisotry     Review Of Systems:     All ROS are negative except per HPI       Allergies    Ceftin (Anaphylaxis)  Cipro (Anaphylaxis)  sulfa drugs (Rash)    Intolerances          PHYSICAL EXAM    ICU Vital Signs Last 24 Hrs  T(C): 36.7 (02 Nov 2020 23:00), Max: 36.7 (02 Nov 2020 20:58)  T(F): 98 (02 Nov 2020 23:00), Max: 98.1 (02 Nov 2020 20:58)  HR: 62 (03 Nov 2020 07:04) (62 - 119)  BP: 162/79 (03 Nov 2020 07:04) (137/67 - 192/89)  BP(mean): 98 (03 Nov 2020 02:55) (98 - 108)  ABP: --  ABP(mean): --  RR: 18 (03 Nov 2020 07:04) (16 - 24)  SpO2: 97% (03 Nov 2020 07:04) (96% - 100%)      CONSTITUTIONAL:  Well nourished.  NAD    ENT:   Airway patent,   Mouth with normal mucosa.   No thrush    EYES:   pupils equal,   round and reactive to light.    CARDIAC:   Normal rate,   Regular rhythm.    Heart sounds S1, S2.   No edema      Vascular:   normal systolic impulse  no bruits    RESPIRATORY:   No wheezing   Normal chest expansion  No use of accessory muscles    GASTROINTESTINAL:  Abdomen soft   Non-tender,   No guarding,   + BS    GENITOURINARY  normal genitalia for sex  no edema    MUSCULOSKELETAL:   Range of motion is not limited,  Nno clubbing, cyanosis    NEUROLOGICAL:   Alert and oriented   No motor or sensory deficits.  Pertinent DTRs normal    SKIN:   Skin normal color for race,   Warm and dry  No evidence of rash.    PSYCHIATRIC:   Normal mood and affect.   No apparent risk to self or others.    HEME LYMPH:   No cervical  lymphadenopathy.  No inguinal lymphadenopathy            11-02-20 @ 07:01  -  11-03-20 @ 07:00  --------------------------------------------------------  IN:  Total IN: 0 mL    OUT:    Voided (mL): 600 mL  Total OUT: 600 mL    Total NET: -600 mL          LABS:                          14.0   12.83 )-----------( 198      ( 02 Nov 2020 10:23 )             44.9                                               11-02    137  |  102  |  24<H>  ----------------------------<  203<H>  5.1<H>   |  23  |  0.9    Ca    9.6      02 Nov 2020 10:23    TPro  7.1  /  Alb  4.1  /  TBili  0.4  /  DBili  x   /  AST  46<H>  /  ALT  38  /  AlkPhos  67  11-02                                                 CARDIAC MARKERS ( 03 Nov 2020 01:43 )  x     / <0.01 ng/mL / x     / x     / x      CARDIAC MARKERS ( 02 Nov 2020 21:14 )  x     / 0.02 ng/mL / 35 U/L / x     / 2.8 ng/mL  CARDIAC MARKERS ( 02 Nov 2020 16:46 )  x     / 0.04 ng/mL / x     / x     / x      CARDIAC MARKERS ( 02 Nov 2020 10:23 )  x     / <0.01 ng/mL / x     / x     / x                                                LIVER FUNCTIONS - ( 02 Nov 2020 10:23 )  Alb: 4.1 g/dL / Pro: 7.1 g/dL / ALK PHOS: 67 U/L / ALT: 38 U/L / AST: 46 U/L / GGT: x                                                                                                                                       X-Rays reviewed:                                                                                    ECHO    CXR interpreted by me:    MEDICATIONS  (STANDING):  amLODIPine   Tablet 5 milliGRAM(s) Oral daily  chlorhexidine 4% Liquid 1 Application(s) Topical <User Schedule>  furosemide   Injectable 40 milliGRAM(s) IV Push two times a day  heparin  Infusion 800 Unit(s)/Hr (8 mL/Hr) IV Continuous <Continuous>  lisinopril 20 milliGRAM(s) Oral daily  metoprolol succinate ER 25 milliGRAM(s) Oral daily  pancrelipase  (CREON 12,000 Lipase Units) 6 Capsule(s) Oral three times a day with meals  pantoprazole    Tablet 40 milliGRAM(s) Oral before breakfast  predniSONE   Tablet 30 milliGRAM(s) Oral daily    MEDICATIONS  (PRN):  ALPRAZolam 0.25 milliGRAM(s) Oral daily PRN anxiety

## 2020-11-03 NOTE — PROGRESS NOTE ADULT - SUBJECTIVE AND OBJECTIVE BOX
SUBJECTIVE:    Currently admitted to medicine with the primary diagnosis of Pulmonary Embolism    Today is hospital day 1    PAST MEDICAL & SURGICAL HISTORY  HTN (hypertension)  Chronic GERD  Autoimmune pancreatitis  Ulcerative colitis    ALLERGIES:  Ceftin (Anaphylaxis)  Cipro (Anaphylaxis)  sulfa drugs (Rash)    MEDICATIONS:  STANDING MEDICATIONS  amLODIPine   Tablet 5 milliGRAM(s) Oral daily  chlorhexidine 4% Liquid 1 Application(s) Topical <User Schedule>  heparin  Infusion 800 Unit(s)/Hr IV Continuous <Continuous>  lisinopril 20 milliGRAM(s) Oral daily  metoprolol succinate ER 25 milliGRAM(s) Oral daily  pancrelipase  (CREON 12,000 Lipase Units) 6 Capsule(s) Oral three times a day with meals  pantoprazole    Tablet 40 milliGRAM(s) Oral before breakfast  predniSONE   Tablet 30 milliGRAM(s) Oral daily    PRN MEDICATIONS  ALPRAZolam 0.25 milliGRAM(s) Oral daily PRN    VITALS:   T(F): 97.3  HR: 76  BP: 143/79  RR: 18  SpO2: 97%    LABS:                        14.0   12.83 )-----------( 198      ( 02 Nov 2020 10:23 )             44.9     11-02    137  |  102  |  24<H>  ----------------------------<  203<H>  5.1<H>   |  23  |  0.9    Ca    9.6      02 Nov 2020 10:23    TPro  7.1  /  Alb  4.1  /  TBili  0.4  /  DBili  x   /  AST  46<H>  /  ALT  38  /  AlkPhos  67  11-02      Troponin T, Serum: <0.01 ng/mL (11-03-20 @ 01:43)  Creatine Kinase, Serum: 35 U/L (11-02-20 @ 21:14)  Troponin T, Serum: 0.02 ng/mL <H> (11-02-20 @ 21:14)  Troponin T, Serum: 0.04 ng/mL <HH> (11-02-20 @ 16:46)  Lactate, Blood: 2.3 mmol/L <H> (11-02-20 @ 16:46)  Troponin T, Serum: <0.01 ng/mL (11-02-20 @ 10:23)    CARDIAC MARKERS ( 03 Nov 2020 01:43 )  x     / <0.01 ng/mL / x     / x     / x      CARDIAC MARKERS ( 02 Nov 2020 21:14 )  x     / 0.02 ng/mL / 35 U/L / x     / 2.8 ng/mL  CARDIAC MARKERS ( 02 Nov 2020 16:46 )  x     / 0.04 ng/mL / x     / x     / x      CARDIAC MARKERS ( 02 Nov 2020 10:23 )  x     / <0.01 ng/mL / x     / x     / x          RADIOLOGY:    CT Angio Chest PE Protocol w/ IV Cont (11.03.20 @ 00:53) >    1.  Acute pulmonary emboli within the distal left and right main pulmonary arteries with additional lobar and segmental extension into all lung lobes.  2.  Evidence of right heart strain; with RV LV ratio of 1.15.  3.  Lung ground glass attenuation, which may be attributed to pulmonary edema in the appropriate clinical setting.      PHYSICAL EXAM:  GEN: No acute distress  LUNGS: Clear to auscultation bilaterally   HEART: S1/S2 present. RRR.   ABD: Soft, non-tender, non-distended. Bowel sounds present  EXT: No edema, no pain on calf palpation  NEURO: [id number]

## 2020-11-03 NOTE — CHART NOTE - NSCHARTNOTEFT_GEN_A_CORE
UPGRADE NOTE:    75y Female transferred to Step Down Unit from Medicine    Patient is a 75y old Female who presents with a chief complaint of SOB (02 Nov 2020 23:36)    The patient is currently admitted for the primary diagnosis of Pulmonary Embolus    The patient was never intubated for days and (was/was never) on pressors for (days).    Indwelling vascular catheters:     Urinary Catheter:     Disposition:    Code Status:    ICU COURSE OF EVENTS:  -------------------------------------------------------------------------------------------        -------------------------------------------------------------------------------------------    Current workup in progress:    SIGN OUT AT 11-03-20 @ 01:27 GIVEN TO: UPGRADE NOTE:    75y Female transferred to Step Down Unit from Medicine    Patient is a 75y old Female who presents with a chief complaint of SOB (02 Nov 2020 23:36)    The patient is currently admitted for the primary diagnosis of Pulmonary Embolus    The patient was never intubated for days and was never on pressors.    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  Pt. reports insidious inset progressively worsening SOB. No inciting factors noted. Pt. reports SOB worse on exertion and currently gets SOB walking up stairs or walking a quarter of a block. Pt. sleeps with two pillows secondary to GERD, no recent changes. Pt. denies chest pain, LE swelling, fever, cough. Pt. went to old cardiologist 3 days ago who prescribed oral lasix which did not improve symptoms Today Pt. felt more SOB with increased work of breathing and called EMS. Of note Pt. missed morning lasix dose.  With EMS Pt. sat 80s improved to 97% on 3 L NC. In ED Pt. afebrile, , /89 chest xray with mild vascular congestion pending official read  trop neg, Lactate 3.8. She was placed on BiPAP and given 40mg IV lasix with significant temporary improvement of symptoms. A repeat HR 97 and /82. Concern for PE noted on medical admission. A D-Dimer was ordered and resulted positive. Patient also developed positive BNP around 900 and troponin peaked to 0.04. CT angio was performed and noted for Right Heart Strain with ratio 1.15 as well as multiple clots and burden. ICU fellow contacted and approved for upgrade to step down unit. She is currently hemodynamically stable and sat 99% on 2L. Given therapeutic dose of lovenox prior to movement to Vent Unit.     -------------------------------------------------------------------------------------------    Current workup in progress:  #SOB worsening on exertion   - CT angio noted for multiple PEs and right heart strain  - D-dimer 4500, BNP peak 900, Troponin peak 0.04  - Given therapeutic lovenox in the ED  - Start on Heparin drip at 0700  - Initial PTT drawn  - Follow up repeat levels   - IR aware of the patient; will evaluate the patient in the morning for catheter directed thrombolysis     #Hx of autoimmune pancreatitis diagnosed August 2020  -currently on prednisone taper on 30 mg prednisone, in past Pt. did not tolerate decreasing dose and is planned for biologic in future with GI -can f/u out pt. If Pt. will remain on daily steroids will likely need ppx   -c/w creon    #HTN-c/w home meds switching bisoprolol for metoprolol succinate    #Gerd on prevacid    Patient to be endorsed to day team upon arrival. Intern at [phone] and on call hospitalist is aware as well UPGRADE NOTE:    75y Female transferred to Step Down Unit from Medicine    Patient is a 75y old Female who presents with a chief complaint of SOB (02 Nov 2020 23:36)    The patient is currently admitted for the primary diagnosis of Pulmonary Embolus    The patient was never intubated for days and was never on pressors.    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  Pt. reports insidious inset progressively worsening SOB. No inciting factors noted. Pt. reports SOB worse on exertion and currently gets SOB walking up stairs or walking a quarter of a block. Pt. sleeps with two pillows secondary to GERD, no recent changes. Pt. denies chest pain, LE swelling, fever, cough. Pt. went to old cardiologist 3 days ago who prescribed oral lasix which did not improve symptoms Today Pt. felt more SOB with increased work of breathing and called EMS. Of note Pt. missed morning lasix dose.  With EMS Pt. sat 80s improved to 97% on 3 L NC. In ED Pt. afebrile, , /89 chest xray with mild vascular congestion pending official read  trop neg, Lactate 3.8. She was placed on BiPAP and given 40mg IV lasix with significant temporary improvement of symptoms. A repeat HR 97 and /82. Concern for PE noted on medical admission. A D-Dimer was ordered and resulted positive. Patient also developed positive BNP around 900 and troponin peaked to 0.04. CT angio was performed and noted for Right Heart Strain with ratio 1.15 as well as multiple clots and burden. ICU fellow contacted and approved for upgrade to step down unit. She is currently hemodynamically stable and sat 99% on 2L. Given therapeutic dose of lovenox prior to movement to Vent Unit.     -------------------------------------------------------------------------------------------    Current workup in progress:  #SOB worsening on exertion   - CT angio noted for multiple PEs and right heart strain  - D-dimer 4500, BNP peak 900, Troponin peak 0.04  - Given therapeutic lovenox in the ED  - Start on Heparin drip at 0700  - Initial PTT drawn  - Follow up repeat levels   - IR aware of the patient; will evaluate the patient in the morning for catheter directed thrombolysis     #Hx of autoimmune pancreatitis diagnosed August 2020  -currently on prednisone taper on 30 mg prednisone, in past Pt. did not tolerate decreasing dose and is planned for biologic in future with GI -can f/u out pt. If Pt. will remain on daily steroids will likely need ppx   -c/w creon    #HTN-c/w home meds switching bisoprolol for metoprolol succinate    #Gerd on prevacid    Patient to be endorsed to day team upon arrival. Intern at [phone] and on call hospitalist is aware as well        Attending attestation:  -The radiologist called me and confirmed that the pt has multiple B/L segmental pulmonary embolisms with right heart strain. Pt was started on therapeutic Lovenox earlier in the night due to high suspicion. IR and ICU consulted. Currently HD stable. Obtain official echo. Heparin. Serial cardiac enzymes. Supplemental O2 PRN.     Will continue to monitor.

## 2020-11-04 LAB
ALBUMIN SERPL ELPH-MCNC: 4 G/DL — SIGNIFICANT CHANGE UP (ref 3.5–5.2)
ALP SERPL-CCNC: 61 U/L — SIGNIFICANT CHANGE UP (ref 30–115)
ALT FLD-CCNC: 40 U/L — SIGNIFICANT CHANGE UP (ref 0–41)
ANION GAP SERPL CALC-SCNC: 15 MMOL/L — HIGH (ref 7–14)
AST SERPL-CCNC: 29 U/L — SIGNIFICANT CHANGE UP (ref 0–41)
BASOPHILS # BLD AUTO: 0.07 K/UL — SIGNIFICANT CHANGE UP (ref 0–0.2)
BASOPHILS NFR BLD AUTO: 0.6 % — SIGNIFICANT CHANGE UP (ref 0–1)
BILIRUB SERPL-MCNC: 0.7 MG/DL — SIGNIFICANT CHANGE UP (ref 0.2–1.2)
BUN SERPL-MCNC: 34 MG/DL — HIGH (ref 10–20)
CALCIUM SERPL-MCNC: 10.3 MG/DL — HIGH (ref 8.5–10.1)
CHLORIDE SERPL-SCNC: 99 MMOL/L — SIGNIFICANT CHANGE UP (ref 98–110)
CO2 SERPL-SCNC: 26 MMOL/L — SIGNIFICANT CHANGE UP (ref 17–32)
CREAT SERPL-MCNC: 1 MG/DL — SIGNIFICANT CHANGE UP (ref 0.7–1.5)
EOSINOPHIL # BLD AUTO: 0.19 K/UL — SIGNIFICANT CHANGE UP (ref 0–0.7)
EOSINOPHIL NFR BLD AUTO: 1.6 % — SIGNIFICANT CHANGE UP (ref 0–8)
GLUCOSE SERPL-MCNC: 94 MG/DL — SIGNIFICANT CHANGE UP (ref 70–99)
HCT VFR BLD CALC: 44.6 % — SIGNIFICANT CHANGE UP (ref 37–47)
HGB BLD-MCNC: 14.2 G/DL — SIGNIFICANT CHANGE UP (ref 12–16)
IMM GRANULOCYTES NFR BLD AUTO: 2 % — HIGH (ref 0.1–0.3)
LYMPHOCYTES # BLD AUTO: 26 % — SIGNIFICANT CHANGE UP (ref 20.5–51.1)
LYMPHOCYTES # BLD AUTO: 3.18 K/UL — SIGNIFICANT CHANGE UP (ref 1.2–3.4)
MAGNESIUM SERPL-MCNC: 1.8 MG/DL — SIGNIFICANT CHANGE UP (ref 1.8–2.4)
MCHC RBC-ENTMCNC: 28.5 PG — SIGNIFICANT CHANGE UP (ref 27–31)
MCHC RBC-ENTMCNC: 31.8 G/DL — LOW (ref 32–37)
MCV RBC AUTO: 89.4 FL — SIGNIFICANT CHANGE UP (ref 81–99)
MONOCYTES # BLD AUTO: 0.78 K/UL — HIGH (ref 0.1–0.6)
MONOCYTES NFR BLD AUTO: 6.4 % — SIGNIFICANT CHANGE UP (ref 1.7–9.3)
NEUTROPHILS # BLD AUTO: 7.77 K/UL — HIGH (ref 1.4–6.5)
NEUTROPHILS NFR BLD AUTO: 63.4 % — SIGNIFICANT CHANGE UP (ref 42.2–75.2)
NRBC # BLD: 0 /100 WBCS — SIGNIFICANT CHANGE UP (ref 0–0)
PLATELET # BLD AUTO: 233 K/UL — SIGNIFICANT CHANGE UP (ref 130–400)
POTASSIUM SERPL-MCNC: 3.4 MMOL/L — LOW (ref 3.5–5)
POTASSIUM SERPL-SCNC: 3.4 MMOL/L — LOW (ref 3.5–5)
PROT SERPL-MCNC: 6.7 G/DL — SIGNIFICANT CHANGE UP (ref 6–8)
RBC # BLD: 4.99 M/UL — SIGNIFICANT CHANGE UP (ref 4.2–5.4)
RBC # FLD: 15.8 % — HIGH (ref 11.5–14.5)
SARS-COV-2 IGG SERPL QL IA: NEGATIVE — SIGNIFICANT CHANGE UP
SARS-COV-2 IGM SERPL IA-ACNC: 1.29 INDEX — SIGNIFICANT CHANGE UP
SODIUM SERPL-SCNC: 140 MMOL/L — SIGNIFICANT CHANGE UP (ref 135–146)
TROPONIN T SERPL-MCNC: <0.01 NG/ML — SIGNIFICANT CHANGE UP
WBC # BLD: 12.24 K/UL — HIGH (ref 4.8–10.8)
WBC # FLD AUTO: 12.24 K/UL — HIGH (ref 4.8–10.8)

## 2020-11-04 PROCEDURE — 93970 EXTREMITY STUDY: CPT | Mod: 26

## 2020-11-04 RX ADMIN — Medication 25 MILLIGRAM(S): at 06:08

## 2020-11-04 RX ADMIN — Medication 6 CAPSULE(S): at 08:23

## 2020-11-04 RX ADMIN — LISINOPRIL 20 MILLIGRAM(S): 2.5 TABLET ORAL at 06:07

## 2020-11-04 RX ADMIN — AMLODIPINE BESYLATE 5 MILLIGRAM(S): 2.5 TABLET ORAL at 08:23

## 2020-11-04 RX ADMIN — Medication 6 CAPSULE(S): at 11:40

## 2020-11-04 RX ADMIN — Medication 30 MILLIGRAM(S): at 06:06

## 2020-11-04 RX ADMIN — ENOXAPARIN SODIUM 80 MILLIGRAM(S): 100 INJECTION SUBCUTANEOUS at 17:53

## 2020-11-04 RX ADMIN — ENOXAPARIN SODIUM 80 MILLIGRAM(S): 100 INJECTION SUBCUTANEOUS at 06:07

## 2020-11-04 RX ADMIN — Medication 6 CAPSULE(S): at 17:53

## 2020-11-04 RX ADMIN — PANTOPRAZOLE SODIUM 40 MILLIGRAM(S): 20 TABLET, DELAYED RELEASE ORAL at 06:10

## 2020-11-04 RX ADMIN — CHLORHEXIDINE GLUCONATE 1 APPLICATION(S): 213 SOLUTION TOPICAL at 06:06

## 2020-11-04 NOTE — PROGRESS NOTE ADULT - ASSESSMENT
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  In the ER further work up revealed Pulmonary Embolism with right sided heart strain. She is currently hemodynamically stable.    Dyspnea  - pending Echocardiogram and lower extremity duplex  - Initial scan shows Acute pulmonary emboli within the distal left and right main pulmonary arteries with additional lobar and segmental extension into all lung lobes. There was evidence of right heart strain; with RV LV ratio of 1.15. It shows lung ground glass attenuation, which may be attributed to pulmonary edema in the appropriate clinical setting.  - D-dimer 4500, BNP peak 900 and Troponin peak 0.04 on admission  - therapeutic lovenox  - IR aware of the patient; no urgent need for intervention    History of autoimmune pancreatitis (diagnosed August 2020)  -currently on prednisone taper on 30 mg prednisone. She did not tolerate decreasing dose and is planned for biologic in future with GI   -on creon    History of Hypertension  - Hemodynamically stable  - home meds switched from bisoprolol for metoprolol succinate    History of Gastroesophageal Refux  - on ppi    Full Code  DASH anti reflux diet  On lovenox  Comes from Home, Vent Unit for now

## 2020-11-04 NOTE — PROGRESS NOTE ADULT - ASSESSMENT
IMPRESSION:    PE   HO autoimmune pancreatitis and UC   Possible MEENA   HO HTN     PLAN:    CNS: no depressants     HEENT: Oral care    PULMONARY:  HOB @ 45 degrees.  Aspiration precautions     CARDIOVASCULAR: I=O.  BP control.  FU ECHO      GI: GI prophylaxis.  Feeding.  Bowel regimen     RENAL:  Follow up lytes.  Correct as needed    INFECTIOUS DISEASE: Follow up cultures.     HEMATOLOGICAL:  DVT therapy.  Start Eliquis PM.  FU LE Duplex.  Will need Hypercoagulable state and age appropriate malignancy ZUNIGA as OP     ENDOCRINE:  Follow up FS.      MUSCULOSKELETAL:  Bed rest today      transfer to floor pM

## 2020-11-04 NOTE — PROGRESS NOTE ADULT - SUBJECTIVE AND OBJECTIVE BOX
SUBJECTIVE:    Currently admitted to medicine with the primary diagnosis of Pulmonary Embolism  Today is hospital day 2    PAST MEDICAL & SURGICAL HISTORY  HTN (hypertension)    Chronic GERD    Autoimmune pancreatitis    H/O ulcerative colitis      SOCIAL HISTORY:  Negative for smoking/alcohol/drug use.     ALLERGIES:  Ceftin (Anaphylaxis)  Cipro (Anaphylaxis)  sulfa drugs (Rash)    MEDICATIONS:  STANDING MEDICATIONS  amLODIPine   Tablet 5 milliGRAM(s) Oral daily  chlorhexidine 4% Liquid 1 Application(s) Topical <User Schedule>  enoxaparin Injectable 80 milliGRAM(s) SubCutaneous every 12 hours  lisinopril 20 milliGRAM(s) Oral daily  metoprolol succinate ER 25 milliGRAM(s) Oral daily  pancrelipase  (CREON 12,000 Lipase Units) 6 Capsule(s) Oral three times a day with meals  pantoprazole    Tablet 40 milliGRAM(s) Oral before breakfast  predniSONE   Tablet 30 milliGRAM(s) Oral daily    PRN MEDICATIONS  ALPRAZolam 0.25 milliGRAM(s) Oral daily PRN    VITALS:   T(F): 97.9  HR: 97  BP: 135/76  RR: 20  SpO2: 94%    LABS:                        14.2   12.24 )-----------( 233      ( 04 Nov 2020 07:00 )             44.6     11-04    140  |  99  |  34<H>  ----------------------------<  94  3.4<L>   |  26  |  1.0    Ca    10.3<H>      04 Nov 2020 07:00  Mg     1.8     11-04    TPro  6.7  /  Alb  4.0  /  TBili  0.7  /  DBili  x   /  AST  29  /  ALT  40  /  AlkPhos  61  11-04          Troponin T, Serum: <0.01 ng/mL (11-04-20 @ 07:00)  Troponin T, Serum: <0.01 ng/mL (11-03-20 @ 16:56)      CARDIAC MARKERS ( 04 Nov 2020 07:00 )  x     / <0.01 ng/mL / x     / x     / x      CARDIAC MARKERS ( 03 Nov 2020 16:56 )  x     / <0.01 ng/mL / x     / x     / x      CARDIAC MARKERS ( 03 Nov 2020 01:43 )  x     / <0.01 ng/mL / x     / x     / x      CARDIAC MARKERS ( 02 Nov 2020 21:14 )  x     / 0.02 ng/mL / 35 U/L / x     / 2.8 ng/mL  CARDIAC MARKERS ( 02 Nov 2020 16:46 )  x     / 0.04 ng/mL / x     / x     / x          RADIOLOGY:      CT Angio Chest PE Protocol w/ IV Cont (11.03.20 @ 00:53) >  1.  Acute pulmonary emboli within the distal left and right main pulmonary arteries with additional lobar and segmental extension into all lung lobes.    2.  Evidence of right heart strain; with RV LV ratio of 1.15.    3.  Lung groundglass attenuation, which may be attributed to pulmonary edema in the appropriate clinical setting.      PHYSICAL EXAM:  GEN: No acute distress  LUNGS: Clear to auscultation bilaterally   HEART: S1/S2 present. RRR.   ABD: Soft, non-tender, non-distended. Bowel sounds present  EXT: No edema  NEURO: AAOX3

## 2020-11-04 NOTE — PROGRESS NOTE ADULT - SUBJECTIVE AND OBJECTIVE BOX
Patient is a 75y old  Female who presents with a chief complaint of SOB (04 Nov 2020 15:55)        Over Night Events:  NO SOB at rest.  FERRARI         ROS:     All ROS are negative except HPI         PHYSICAL EXAM    ICU Vital Signs Last 24 Hrs  T(C): 36.6 (04 Nov 2020 15:00), Max: 36.6 (04 Nov 2020 15:00)  T(F): 97.9 (04 Nov 2020 15:00), Max: 97.9 (04 Nov 2020 15:00)  HR: 97 (04 Nov 2020 15:00) (75 - 115)  BP: 135/76 (04 Nov 2020 15:00) (121/70 - 150/70)  BP(mean): 100 (04 Nov 2020 15:00) (100 - 102)  ABP: --  ABP(mean): --  RR: 20 (04 Nov 2020 15:00) (18 - 20)  SpO2: 94% (04 Nov 2020 12:00) (94% - 100%)      CONSTITUTIONAL:  Well nourished.  NAD    ENT:   Airway patent,   Mouth with normal mucosa.   No thrush    EYES:   Pupils equal,   Round and reactive to light.    CARDIAC:   Normal rate,   Regular rhythm.    No edema      Vascular:  Normal systolic impulse  No Carotid bruits    RESPIRATORY:   No wheezing  Bilateral BS  Normal chest expansion  Not tachypneic,  No use of accessory muscles    GASTROINTESTINAL:  Abdomen soft,   Non-tender,   No guarding,   + BS    MUSCULOSKELETAL:   Range of motion is not limited,  No clubbing, cyanosis    NEUROLOGICAL:   Alert and oriented   No motor  deficits.    SKIN:   Skin normal color for race,   Warm and dry and intact.   No evidence of rash.    PSYCHIATRIC:   Normal mood and affect.   No apparent risk to self or others.    HEMATOLOGICAL:  No cervical  lymphadenopathy.  no inguinal lymphadenopathy      11-03-20 @ 07:01  -  11-04-20 @ 07:00  --------------------------------------------------------  IN:  Total IN: 0 mL    OUT:    Voided (mL): 1000 mL  Total OUT: 1000 mL    Total NET: -1000 mL      11-04-20 @ 07:01  -  11-04-20 @ 20:25  --------------------------------------------------------  IN:  Total IN: 0 mL    OUT:    Colostomy (mL): 300 mL  Total OUT: 300 mL    Total NET: -300 mL          LABS:                            14.2   12.24 )-----------( 233      ( 04 Nov 2020 07:00 )             44.6                                               11-04    140  |  99  |  34<H>  ----------------------------<  94  3.4<L>   |  26  |  1.0    Ca    10.3<H>      04 Nov 2020 07:00  Mg     1.8     11-04    TPro  6.7  /  Alb  4.0  /  TBili  0.7  /  DBili  x   /  AST  29  /  ALT  40  /  AlkPhos  61  11-04                                                 CARDIAC MARKERS ( 04 Nov 2020 07:00 )  x     / <0.01 ng/mL / x     / x     / x      CARDIAC MARKERS ( 03 Nov 2020 16:56 )  x     / <0.01 ng/mL / x     / x     / x      CARDIAC MARKERS ( 03 Nov 2020 01:43 )  x     / <0.01 ng/mL / x     / x     / x      CARDIAC MARKERS ( 02 Nov 2020 21:14 )  x     / 0.02 ng/mL / 35 U/L / x     / 2.8 ng/mL                                            LIVER FUNCTIONS - ( 04 Nov 2020 07:00 )  Alb: 4.0 g/dL / Pro: 6.7 g/dL / ALK PHOS: 61 U/L / ALT: 40 U/L / AST: 29 U/L / GGT: x                                                                                                                                       MEDICATIONS  (STANDING):  amLODIPine   Tablet 5 milliGRAM(s) Oral daily  chlorhexidine 4% Liquid 1 Application(s) Topical <User Schedule>  enoxaparin Injectable 80 milliGRAM(s) SubCutaneous every 12 hours  lisinopril 20 milliGRAM(s) Oral daily  metoprolol succinate ER 25 milliGRAM(s) Oral daily  pancrelipase  (CREON 12,000 Lipase Units) 6 Capsule(s) Oral three times a day with meals  pantoprazole    Tablet 40 milliGRAM(s) Oral before breakfast  predniSONE   Tablet 30 milliGRAM(s) Oral daily    MEDICATIONS  (PRN):  ALPRAZolam 0.25 milliGRAM(s) Oral daily PRN anxiety      New X-rays reviewed:                                                                                  ECHO    CXR interpreted by me:

## 2020-11-05 ENCOUNTER — TRANSCRIPTION ENCOUNTER (OUTPATIENT)
Age: 75
End: 2020-11-05

## 2020-11-05 VITALS — HEART RATE: 97 BPM | DIASTOLIC BLOOD PRESSURE: 74 MMHG | TEMPERATURE: 97 F | SYSTOLIC BLOOD PRESSURE: 131 MMHG

## 2020-11-05 PROCEDURE — 93306 TTE W/DOPPLER COMPLETE: CPT | Mod: 26

## 2020-11-05 RX ORDER — APIXABAN 2.5 MG/1
2 TABLET, FILM COATED ORAL
Qty: 28 | Refills: 0
Start: 2020-11-05 | End: 2020-11-11

## 2020-11-05 RX ORDER — MAGNESIUM SULFATE 500 MG/ML
2 VIAL (ML) INJECTION ONCE
Refills: 0 | Status: COMPLETED | OUTPATIENT
Start: 2020-11-05 | End: 2020-11-05

## 2020-11-05 RX ORDER — APIXABAN 2.5 MG/1
10 TABLET, FILM COATED ORAL EVERY 12 HOURS
Refills: 0 | Status: DISCONTINUED | OUTPATIENT
Start: 2020-11-05 | End: 2020-11-05

## 2020-11-05 RX ORDER — POTASSIUM CHLORIDE 20 MEQ
40 PACKET (EA) ORAL EVERY 4 HOURS
Refills: 0 | Status: COMPLETED | OUTPATIENT
Start: 2020-11-05 | End: 2020-11-05

## 2020-11-05 RX ADMIN — Medication 6 CAPSULE(S): at 07:55

## 2020-11-05 RX ADMIN — Medication 6 CAPSULE(S): at 13:47

## 2020-11-05 RX ADMIN — LISINOPRIL 20 MILLIGRAM(S): 2.5 TABLET ORAL at 05:31

## 2020-11-05 RX ADMIN — Medication 40 MILLIEQUIVALENT(S): at 17:22

## 2020-11-05 RX ADMIN — ENOXAPARIN SODIUM 80 MILLIGRAM(S): 100 INJECTION SUBCUTANEOUS at 05:31

## 2020-11-05 RX ADMIN — AMLODIPINE BESYLATE 5 MILLIGRAM(S): 2.5 TABLET ORAL at 05:31

## 2020-11-05 RX ADMIN — APIXABAN 10 MILLIGRAM(S): 2.5 TABLET, FILM COATED ORAL at 17:23

## 2020-11-05 RX ADMIN — Medication 40 MILLIEQUIVALENT(S): at 13:51

## 2020-11-05 RX ADMIN — Medication 50 GRAM(S): at 13:49

## 2020-11-05 RX ADMIN — Medication 30 MILLIGRAM(S): at 05:31

## 2020-11-05 RX ADMIN — Medication 25 MILLIGRAM(S): at 05:31

## 2020-11-05 RX ADMIN — PANTOPRAZOLE SODIUM 40 MILLIGRAM(S): 20 TABLET, DELAYED RELEASE ORAL at 06:21

## 2020-11-05 NOTE — PROGRESS NOTE ADULT - SUBJECTIVE AND OBJECTIVE BOX
Patient is a 75y old  Female who presents with a chief complaint of SOB (04 Nov 2020 15:55)        Over Night Events:  Feels better.  On RA.  Off Pressors.  SP LE Duplex.  Awaiting ECHO         ROS:     All ROS are negative except HPI         PHYSICAL EXAM    ICU Vital Signs Last 24 Hrs  T(C): 36.1 (05 Nov 2020 04:32), Max: 36.6 (04 Nov 2020 15:00)  T(F): 97 (05 Nov 2020 04:32), Max: 97.9 (04 Nov 2020 15:00)  HR: 78 (05 Nov 2020 04:32) (78 - 115)  BP: 148/78 (05 Nov 2020 04:32) (135/76 - 148/78)  BP(mean): 100 (04 Nov 2020 15:00) (100 - 102)  ABP: --  ABP(mean): --  RR: 18 (05 Nov 2020 04:32) (18 - 20)  SpO2: 98% (05 Nov 2020 04:32) (94% - 100%)      CONSTITUTIONAL:  Well nourished.  NAD    ENT:   Airway patent,   Mouth with normal mucosa.   No thrush    EYES:   Pupils equal,   Round and reactive to light.    CARDIAC:   Normal rate,   Regular rhythm.    No edema      Vascular:  Normal systolic impulse  No Carotid bruits    RESPIRATORY:   No wheezing  Bilateral BS  Normal chest expansion  Not tachypneic,  No use of accessory muscles    GASTROINTESTINAL:  Abdomen soft,   Non-tender,   No guarding,   + BS    MUSCULOSKELETAL:   Range of motion is not limited,  No clubbing, cyanosis    NEUROLOGICAL:   Alert and oriented   No motor  deficits.    SKIN:   Skin normal color for race,   Warm and dry and intact.   No evidence of rash.    PSYCHIATRIC:   Normal mood and affect.   No apparent risk to self or others.    HEMATOLOGICAL:  No cervical  lymphadenopathy.  no inguinal lymphadenopathy      11-04-20 @ 07:01  -  11-05-20 @ 07:00  --------------------------------------------------------  IN:  Total IN: 0 mL    OUT:    Colostomy (mL): 700 mL    Voided (mL): 1600 mL  Total OUT: 2300 mL    Total NET: -2300 mL          LABS:                            14.2   12.24 )-----------( 233      ( 04 Nov 2020 07:00 )             44.6                                               11-04    140  |  99  |  34<H>  ----------------------------<  94  3.4<L>   |  26  |  1.0    Ca    10.3<H>      04 Nov 2020 07:00  Mg     1.8     11-04    TPro  6.7  /  Alb  4.0  /  TBili  0.7  /  DBili  x   /  AST  29  /  ALT  40  /  AlkPhos  61  11-04                                                 CARDIAC MARKERS ( 04 Nov 2020 07:00 )  x     / <0.01 ng/mL / x     / x     / x      CARDIAC MARKERS ( 03 Nov 2020 16:56 )  x     / <0.01 ng/mL / x     / x     / x                                                LIVER FUNCTIONS - ( 04 Nov 2020 07:00 )  Alb: 4.0 g/dL / Pro: 6.7 g/dL / ALK PHOS: 61 U/L / ALT: 40 U/L / AST: 29 U/L / GGT: x                                                                                                                                       MEDICATIONS  (STANDING):  amLODIPine   Tablet 5 milliGRAM(s) Oral daily  chlorhexidine 4% Liquid 1 Application(s) Topical <User Schedule>  enoxaparin Injectable 80 milliGRAM(s) SubCutaneous every 12 hours  lisinopril 20 milliGRAM(s) Oral daily  metoprolol succinate ER 25 milliGRAM(s) Oral daily  pancrelipase  (CREON 12,000 Lipase Units) 6 Capsule(s) Oral three times a day with meals  pantoprazole    Tablet 40 milliGRAM(s) Oral before breakfast  predniSONE   Tablet 30 milliGRAM(s) Oral daily    MEDICATIONS  (PRN):  ALPRAZolam 0.25 milliGRAM(s) Oral daily PRN anxiety      New X-rays reviewed:                                                                                  ECHO    CXR interpreted by me:

## 2020-11-05 NOTE — CDI QUERY NOTE - NSCDIOTHERTXTBX2_GEN_ALL_CORE_FT
Documentation:  75 YOF presented with SOB  ** Cardiology consult 11/2: Hypoxic RF (85% on ra on admission).  ** H&P: SOB -worse on exertion, vascular congestion on Xray responded to IV lasix, and improving with BiPAP   ** DR. Freitas PN 11/5:   Initial scan shows lung ground glass attenuation, which may be attributed to pulmonary edema in the appropriate clinical setting.    Vital ED: sat 85% on RA    Lab:   Blood Gas Profile - Venous (11.02.20 @ 10:23)    pH: 7.30:     pCO2: 53    pO2: 30:     HCO3: 26    Base Excess: 1.3    Oxygen Saturation: 45      CT chest 11/3: Lung ground glass attenuation, which may be attributed to pulmonary edema in the appropriate clinical setting.    Treatment:   In the ED pt was placed on NC 5L that was upgraded to BiPAP  Lasix  40 mg IV    Based on your clinical evaluation of the patient, can you please clarify:  * Pt had pulmonary edema. (please specify the acuity)  * pulmonary edema ruled out  * Other (please specify)  * Unable to clinically determine.

## 2020-11-05 NOTE — DISCHARGE NOTE PROVIDER - NSDCCPCAREPLAN_GEN_ALL_CORE_FT
PRINCIPAL DISCHARGE DIAGNOSIS  Diagnosis: Pulmonary emboli  Assessment and Plan of Treatment: patient stable. discharged home on eliquis. will follow up with pulmonologist and cardiologist as outpatient       PRINCIPAL DISCHARGE DIAGNOSIS  Diagnosis: Pulmonary emboli  Assessment and Plan of Treatment: You will be started on a medication called Eliquis for your pulmonary embolus.  Please take Eliquis 10mg two times a day (morning and evening) for 6 more days, and then after that take the Eliquis 5mg two times a day (morning and evening) continuously.  Please follow up with your primary care doctor as well as pulmonology and cardiology as an outpatient.

## 2020-11-05 NOTE — DISCHARGE NOTE PROVIDER - CARE PROVIDER_API CALL
Darnell Hassan)  Critical Care Medicine; Pulmonary Disease; Sleep Medicine  [address] [place] [address]  Phone: [phone]  Fax: [phone]  Follow Up Time: 2 weeks    King Dong)  Cardiovascular Disease; Internal Medicine  [address] [place] [address], [place] [address]  Phone: [phone]  Fax: [phone]  Established Patient  Follow Up Time: 2 weeks   Darnell Hassan)  Critical Care Medicine; Pulmonary Disease; Sleep Medicine  [address] [place] [address]  Phone: [phone]  Fax: [phone]  Scheduled Appointment: 11/20/2020 11:45 AM    King Dong)  Cardiovascular Disease; Internal Medicine  [address] [place] [address]  Phone: [phone]  Fax: [phone]  Established Patient  Follow Up Time: 2 weeks

## 2020-11-05 NOTE — DISCHARGE NOTE NURSING/CASE MANAGEMENT/SOCIAL WORK - PATIENT PORTAL LINK FT
You can access the FollowMyHealth Patient Portal offered by [facility] by registering at the following website: [email or web address]. By joining "[employer]’s FollowMyHealth portal, you will also be able to view your health information using other applications (apps) compatible with our system.

## 2020-11-05 NOTE — DISCHARGE NOTE PROVIDER - NSDCMRMEDTOKEN_GEN_ALL_CORE_FT
ALPRAZolam 0.25 mg oral tablet: 1 tab(s) orally once a day, As Needed  amlodipine-benazepril 5 mg-20 mg oral capsule: 1 cap(s) orally once a day  bisoprolol 10 mg oral tablet: 1 tab(s) orally once a day  Creon 36,000 units oral delayed release capsule: 2 tab(s) orally 3 times a day (with meals)  Eliquis 5 mg oral tablet: 1 tab(s) orally 2 times a day   Eliquis Starter Pack for Treatment of DVT and PE 5 mg oral tablet: 2 tab(s) orally 2 times a day   predniSONE: 30 milligram(s) orally once a day  Prevacid 15 mg oral delayed release capsule: 1 cap(s) orally once a day

## 2020-11-05 NOTE — CDI QUERY NOTE - NSCDI_DOCCLARIFY2_GEN_ALL_CORE_FT
Documentation clarification is required for accuracy in coding and billing, claim validation and reporting severity of illness, quality data and risk of mortality. no

## 2020-11-05 NOTE — PROGRESS NOTE ADULT - ASSESSMENT
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  In the ER further work up revealed Pulmonary Embolism with right sided heart strain. She is currently hemodynamically stable.    Dyspnea  - pending Echocardiogram today  - negative lower extremity duplex  - Initial scan shows Acute pulmonary emboli within the distal left and right main pulmonary arteries with additional lobar and segmental extension into all lung lobes. There was evidence of right heart strain; with RV LV ratio of 1.15. It shows lung ground glass attenuation, which may be attributed to pulmonary edema in the appropriate clinical setting.  - D-dimer 4500, BNP peak 900 and Troponin peak 0.04 on admission  - therapeutic lovenox  - IR aware of the patient; no urgent need for intervention    History of autoimmune pancreatitis (diagnosed August 2020)  -currently on 30 mg prednisone. She did not tolerate decreasing dose and is planned for biologic in future with GI   -on creon    History of Hypertension  - Hemodynamically stable, slightly elevated systolic blood pressure. Will re-evaluate in the afternoon for any adjustments  - on Lisinopril 20 mg and Amlodipine 5 mg  - on metoprolol 25 mg xl, was on bisoprolol 10 mg at home    History of Gastroesophageal Refux  - on protonix     Full Code  DASH anti reflux diet  On lovenox  Comes from Home, Vent Unit for now, possible discharge today

## 2020-11-05 NOTE — PROGRESS NOTE ADULT - SUBJECTIVE AND OBJECTIVE BOX
SUBJECTIVE:    Currently admitted to medicine with the primary diagnosis of pulmonary embolism  Today is hospital day 3    PAST MEDICAL & SURGICAL HISTORY  HTN (hypertension)    Chronic GERD    Autoimmune pancreatitis    H/O ulcerative colitis      ALLERGIES:  Ceftin (Anaphylaxis)  Cipro (Anaphylaxis)  sulfa drugs (Rash)    MEDICATIONS:  STANDING MEDICATIONS  amLODIPine   Tablet 5 milliGRAM(s) Oral daily  apixaban 10 milliGRAM(s) Oral every 12 hours  chlorhexidine 4% Liquid 1 Application(s) Topical <User Schedule>  lisinopril 20 milliGRAM(s) Oral daily  metoprolol succinate ER 25 milliGRAM(s) Oral daily  pancrelipase  (CREON 12,000 Lipase Units) 6 Capsule(s) Oral three times a day with meals  pantoprazole    Tablet 40 milliGRAM(s) Oral before breakfast  predniSONE   Tablet 30 milliGRAM(s) Oral daily    PRN MEDICATIONS  ALPRAZolam 0.25 milliGRAM(s) Oral daily PRN    VITALS:   T(F): 98.1  HR: 85  BP: 159/974  RR: 18  SpO2: 98%    LABS:                        14.2   12.24 )-----------( 233      ( 04 Nov 2020 07:00 )             44.6     11-04    140  |  99  |  34<H>  ----------------------------<  94  3.4<L>   |  26  |  1.0    Ca    10.3<H>      04 Nov 2020 07:00  Mg     1.8     11-04    TPro  6.7  /  Alb  4.0  /  TBili  0.7  /  DBili  x   /  AST  29  /  ALT  40  /  AlkPhos  61  11-04              CARDIAC MARKERS ( 04 Nov 2020 07:00 )  x     / <0.01 ng/mL / x     / x     / x      CARDIAC MARKERS ( 03 Nov 2020 16:56 )  x     / <0.01 ng/mL / x     / x     / x          RADIOLOGY:      VA Duplex Lower Ext Vein Scan, Bilat (11.04.20 @ 16:53) >  No evidence of deep venous thrombosis or superficial thrombophlebitis in the bilateral lower extremities.  Popliteal fossa Baker's cyst measuring 5.4 x 1 x 2.3 cm    PHYSICAL EXAM:  GEN: No acute distress  LUNGS: breath sounds bilaterally   HEART: S1/S2 present. RRR.   ABD: Soft, non-tender, non-distended. Bowel sounds present  EXT: No edema  NEURO: AAOX3

## 2020-11-05 NOTE — PROGRESS NOTE ADULT - ASSESSMENT
IMPRESSION:    PE   HO autoimmune pancreatitis and UC   Possible MEENA   HO HTN     PLAN:    CNS: no depressants     HEENT: Oral care    PULMONARY:  HOB @ 45 degrees.  Aspiration precautions     CARDIOVASCULAR: I=O.  BP control.  FU ECHO      GI: GI prophylaxis.  Feeding.  Bowel regimen     RENAL:  Follow up lytes.  Correct as needed    INFECTIOUS DISEASE: Follow up cultures.     HEMATOLOGICAL:  DVT therapy.  Start Eliquis PM.  FU LE Duplex.  Will need Hypercoagulable state and age appropriate malignancy ZUNIGA as OP     ENDOCRINE:  Follow up FS.      MUSCULOSKELETAL:  Bed rest today      Possible DC home today

## 2020-11-06 ENCOUNTER — APPOINTMENT (OUTPATIENT)
Dept: CARE COORDINATION | Facility: HOME HEALTH | Age: 75
End: 2020-11-06
Payer: MEDICARE

## 2020-11-06 ENCOUNTER — NON-APPOINTMENT (OUTPATIENT)
Age: 75
End: 2020-11-06

## 2020-11-06 VITALS — RESPIRATION RATE: 14 BRPM

## 2020-11-06 PROBLEM — K21.9 GASTRO-ESOPHAGEAL REFLUX DISEASE WITHOUT ESOPHAGITIS: Chronic | Status: ACTIVE | Noted: 2020-11-02

## 2020-11-06 PROBLEM — Z87.19 PERSONAL HISTORY OF OTHER DISEASES OF THE DIGESTIVE SYSTEM: Chronic | Status: ACTIVE | Noted: 2020-11-02

## 2020-11-06 PROBLEM — I10 ESSENTIAL (PRIMARY) HYPERTENSION: Chronic | Status: ACTIVE | Noted: 2020-11-02

## 2020-11-06 PROCEDURE — 99348 HOME/RES VST EST LOW MDM 30: CPT | Mod: 95

## 2020-11-06 RX ORDER — PANCRELIPASE 36000; 180000; 114000 [USP'U]/1; [USP'U]/1; [USP'U]/1
36000-114000 CAPSULE, DELAYED RELEASE PELLETS ORAL
Refills: 0 | Status: ACTIVE | COMMUNITY

## 2020-11-06 RX ORDER — RANITIDINE 150 MG/1
150 TABLET ORAL
Refills: 0 | Status: DISCONTINUED | COMMUNITY
End: 2020-11-06

## 2020-11-06 RX ORDER — OMEGA-3-ACID ETHYL ESTERS 1 G/1
1 CAPSULE, LIQUID FILLED ORAL
Refills: 0 | Status: DISCONTINUED | COMMUNITY
End: 2020-11-06

## 2020-11-06 RX ORDER — HYDROCHLOROTHIAZIDE 12.5 MG/1
12.5 TABLET ORAL DAILY
Qty: 90 | Refills: 1 | Status: DISCONTINUED | COMMUNITY
Start: 2019-03-12 | End: 2020-11-06

## 2020-11-06 RX ORDER — ALPRAZOLAM 0.25 MG/1
0.25 TABLET ORAL
Refills: 0 | Status: ACTIVE | COMMUNITY

## 2020-11-06 RX ORDER — FENOFIBRATE 134 MG/1
134 CAPSULE ORAL DAILY
Refills: 0 | Status: DISCONTINUED | COMMUNITY
End: 2020-11-06

## 2020-11-06 NOTE — HISTORY OF PRESENT ILLNESS
[Home] : at home, [unfilled] , at the time of the visit. [Other Location: e.g. Home (Enter Location, City,State)___] : at [unfilled] [Family Member] : family member [Verbal consent obtained from patient] : the patient, [unfilled] [FreeTextEntry1] : pt eval for SOB [de-identified] : This is a 75 year old female enrolled in [employer] with a dx of CHF.   She was hospitalized from 11/2/2020-11/5/2020.  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 In the ER further work up revealed Pulmonary Embolism with right sided heart strain (RV LV ratio of 1.15) and MVR. \par She was initially started on heparin, transitioned to therapeutic lovenox and later switched to oral eliquis to be taken at home.  At time of televisit she is awake, alert and oriented x 3.  She was speaking in full sentences and in no respiratory distress.  She denied SOB or chest pain or palpitations.  Her son lives next door and was present.  Pt has a follow up appointment scheduled with Dr Sanches.\par

## 2020-11-06 NOTE — COUNSELING
[Fall prevention counseling provided] : Fall prevention counseling provided [Adequate lighting] : Adequate lighting [No throw rugs] : No throw rugs [Use proper foot wear] : Use proper foot wear [Use recommended devices] : Use recommended devices [de-identified] : [employer] TCM STARS teaching: Enforced with patient need for daily weights. Advised to call for an increase of greater than 2 lbs. in a day or 5 pounds in a week. Adhere to low salt diet and educated patient on foods that should be avoided such as processed or fast food. Limit fluids to 1 liter a day which is 4-5 glasses. Continue medications as ordered.  Follow up with Cardiologist. Contact information given, patient advised to call with any questions/concerns\par

## 2020-11-06 NOTE — PLAN
[FreeTextEntry1] : chf\par c/w amlodipine/ benazapril\par daily weights\par follow up with cardiologist\par \par PE\par c/w eliquis 5 mg BID\par follow up with all MD appointments\par

## 2020-11-11 DIAGNOSIS — Z86.19 PERSONAL HISTORY OF OTHER INFECTIOUS AND PARASITIC DISEASES: ICD-10-CM

## 2020-11-11 DIAGNOSIS — R09.02 HYPOXEMIA: ICD-10-CM

## 2020-11-11 DIAGNOSIS — Z87.891 PERSONAL HISTORY OF NICOTINE DEPENDENCE: ICD-10-CM

## 2020-11-11 DIAGNOSIS — R06.03 ACUTE RESPIRATORY DISTRESS: ICD-10-CM

## 2020-11-11 DIAGNOSIS — K86.1 OTHER CHRONIC PANCREATITIS: ICD-10-CM

## 2020-11-11 DIAGNOSIS — Z88.2 ALLERGY STATUS TO SULFONAMIDES: ICD-10-CM

## 2020-11-11 DIAGNOSIS — K21.9 GASTRO-ESOPHAGEAL REFLUX DISEASE WITHOUT ESOPHAGITIS: ICD-10-CM

## 2020-11-11 DIAGNOSIS — T38.0X5A ADVERSE EFFECT OF GLUCOCORTICOIDS AND SYNTHETIC ANALOGUES, INITIAL ENCOUNTER: ICD-10-CM

## 2020-11-11 DIAGNOSIS — I27.89 OTHER SPECIFIED PULMONARY HEART DISEASES: ICD-10-CM

## 2020-11-11 DIAGNOSIS — I26.99 OTHER PULMONARY EMBOLISM WITHOUT ACUTE COR PULMONALE: ICD-10-CM

## 2020-11-11 DIAGNOSIS — R73.9 HYPERGLYCEMIA, UNSPECIFIED: ICD-10-CM

## 2020-11-11 DIAGNOSIS — I10 ESSENTIAL (PRIMARY) HYPERTENSION: ICD-10-CM

## 2020-11-11 DIAGNOSIS — Z93.2 ILEOSTOMY STATUS: ICD-10-CM

## 2020-11-11 DIAGNOSIS — I21.A1 MYOCARDIAL INFARCTION TYPE 2: ICD-10-CM

## 2020-11-11 DIAGNOSIS — Z85.42 PERSONAL HISTORY OF MALIGNANT NEOPLASM OF OTHER PARTS OF UTERUS: ICD-10-CM

## 2020-11-12 RX ORDER — APIXABAN 2.5 MG/1
1 TABLET, FILM COATED ORAL
Qty: 120 | Refills: 0
Start: 2020-11-12 | End: 2021-01-10

## 2020-11-13 ENCOUNTER — APPOINTMENT (OUTPATIENT)
Dept: CARDIOLOGY | Facility: CLINIC | Age: 75
End: 2020-11-13
Payer: MEDICARE

## 2020-11-13 VITALS
TEMPERATURE: 97.3 F | HEIGHT: 62 IN | DIASTOLIC BLOOD PRESSURE: 70 MMHG | SYSTOLIC BLOOD PRESSURE: 130 MMHG | WEIGHT: 182 LBS | HEART RATE: 80 BPM | BODY MASS INDEX: 33.49 KG/M2

## 2020-11-13 PROCEDURE — 93000 ELECTROCARDIOGRAM COMPLETE: CPT

## 2020-11-13 PROCEDURE — 99214 OFFICE O/P EST MOD 30 MIN: CPT

## 2020-11-14 NOTE — DISCUSSION/SUMMARY
[FreeTextEntry1] : Cont Amlodipine-Benazepril.\par Cont Bisoprolol.\par Cont Eliquis.  Pulmonary follow-up.\par Walks / light exercise.\par Follow-up 6-months.

## 2020-11-14 NOTE — ASSESSMENT
[FreeTextEntry1] : Acute PE (11/2020):\par No RV dysfunction.\par Recovering well.\par \par BP controlled.

## 2020-11-14 NOTE — REASON FOR VISIT
[Follow-Up - Clinic] : a clinic follow-up of [Hypertension] : hypertension [FreeTextEntry1] : Interval hospitalization.  Acute PE.  Apparently unprovoked.  Bilateral distal main PA with lobar extension.  Treated with anticoagulation.\par \par She and  had COVID in March.  She had relatively mild disease.   had severe disease and .\par \par Recovering well.\par \par No chest pain.  Breathing comfortable.\par \par ECHO (20): nL biventricular size / function.  Mild MR.  nL RVSP.

## 2020-11-27 ENCOUNTER — APPOINTMENT (OUTPATIENT)
Dept: HEMATOLOGY ONCOLOGY | Facility: CLINIC | Age: 75
End: 2020-11-27
Payer: MEDICARE

## 2020-11-27 ENCOUNTER — OUTPATIENT (OUTPATIENT)
Dept: OUTPATIENT SERVICES | Facility: HOSPITAL | Age: 75
LOS: 1 days | Discharge: HOME | End: 2020-11-27

## 2020-11-27 VITALS
HEIGHT: 62 IN | TEMPERATURE: 97.4 F | DIASTOLIC BLOOD PRESSURE: 70 MMHG | WEIGHT: 183 LBS | HEART RATE: 76 BPM | BODY MASS INDEX: 33.68 KG/M2 | SYSTOLIC BLOOD PRESSURE: 142 MMHG

## 2020-11-27 DIAGNOSIS — U07.1 COVID-19: ICD-10-CM

## 2020-11-27 DIAGNOSIS — Z63.4 DISAPPEARANCE AND DEATH OF FAMILY MEMBER: ICD-10-CM

## 2020-11-27 DIAGNOSIS — K51.90 ULCERATIVE COLITIS, UNSPECIFIED, W/OUT COMPLICATIONS: ICD-10-CM

## 2020-11-27 PROCEDURE — 99204 OFFICE O/P NEW MOD 45 MIN: CPT

## 2020-11-27 SDOH — SOCIAL STABILITY - SOCIAL INSECURITY: DISSAPEARANCE AND DEATH OF FAMILY MEMBER: Z63.4

## 2020-11-27 NOTE — HISTORY OF PRESENT ILLNESS
[de-identified] : The patient is a 75 year old white female referred by Dr. Darnell Hassan for evaluation of thrombophilia. \par The patient was hospitalized this past November 2nd for inability to breath following a period of 1-1.5 weeks of difficulty breathing. The patient was diagnosed with multiple pulmonary emboli with apparently a negative duplex-Doppler study. She spent about 4 days in the hospital, was diagnosed with the PE and was started on anticoagulation with heparin drip then Lovenox, eventually discharged on Eliquis. \par She is tolerating the Eliquis well. No bruising or bleeding. The SOB is now 90% better. She may still be having some difficulty climbing steps. \par The patient is denying any weight loss. She was recently diagnosed with autoimmune pancreatitis and has been on steroids since August. She is now being referred to a rheumatologist for further evaluation. No fever or night sweats. Appetite is good. No problems with urine. No new problems with ileostomy. \par She is up to date with her mammograms.\par \par The patient was diagnosed as having being infected with Covid-19 but did not require hospitalization. Her  was affected by the same virus, hospitalized and passed away from to the infection.\par

## 2020-11-27 NOTE — PHYSICAL EXAM
[Fully active, able to carry on all pre-disease performance without restriction] : Status 0 - Fully active, able to carry on all pre-disease performance without restriction [Obese] : obese [Normal] : affect appropriate [de-identified] : I [de-identified] : Mild arthritic changes

## 2020-11-27 NOTE — ASSESSMENT
[FreeTextEntry1] : Unprovoked pulmonary embolism in a patient with history of Covid-19 infection 6 months ago, minimally symptomatic, not requiring hospitalization, and ulcerative colitis, S/P total colectomy several years ago, presently on Eliquis,.\par The situation was discussed with the patient. She was told that, although she had no obvious provoking factors, she is at high risk for thromboembolic complications given her history of ulcerative colitis which may the risk of clots by 3 times. Whether the Covid infection had anything to do with it is not clear but probably unlikely.\par Since the thromboembolic complications occurred at a time when her ulcerative colitis was under control with no activity, the patient should stay on anticoagulation with Eliquis indefinitely, for the time being, as per the ACCP, CAG (both from 2014) and a recent CATHIE guidelines. \par As far as further work up, I do not feel that ordering JIMENEZ-1 or TAFI will make any difference in terms of her management.\par \par All questions answered.\par \par The patient will be seen again in 6 months for follow up.

## 2020-11-27 NOTE — REVIEW OF SYSTEMS
[SOB on Exertion] : shortness of breath during exertion [Joint Pain] : joint pain [Joint Stiffness] : joint stiffness [Insomnia] : insomnia [Anxiety] : anxiety [Negative] : Heme/Lymph

## 2020-11-27 NOTE — CONSULT LETTER
[Dear  ___] : Dear ~NATALI, [Consult Letter:] : I had the pleasure of evaluating your patient, [unfilled]. [Please see my note below.] : Please see my note below. [Consult Closing:] : Thank you very much for allowing me to participate in the care of this patient.  If you have any questions, please do not hesitate to contact me. [Sincerely,] : Sincerely, [FreeTextEntry2] : Dr. Darnell Hassan [FreeTextEntry3] : Dr. LARRY Jerome

## 2020-12-11 ENCOUNTER — TRANSCRIPTION ENCOUNTER (OUTPATIENT)
Age: 75
End: 2020-12-11

## 2020-12-11 DIAGNOSIS — D68.59 OTHER PRIMARY THROMBOPHILIA: ICD-10-CM

## 2020-12-28 ENCOUNTER — APPOINTMENT (OUTPATIENT)
Dept: CARDIOLOGY | Facility: CLINIC | Age: 75
End: 2020-12-28

## 2021-02-22 ENCOUNTER — APPOINTMENT (OUTPATIENT)
Dept: HEMATOLOGY ONCOLOGY | Facility: CLINIC | Age: 76
End: 2021-02-22

## 2021-04-22 ENCOUNTER — APPOINTMENT (OUTPATIENT)
Dept: PULMONOLOGY | Facility: CLINIC | Age: 76
End: 2021-04-22
Payer: MEDICARE

## 2021-04-22 VITALS
WEIGHT: 175 LBS | BODY MASS INDEX: 32.2 KG/M2 | HEART RATE: 82 BPM | SYSTOLIC BLOOD PRESSURE: 134 MMHG | RESPIRATION RATE: 14 BRPM | OXYGEN SATURATION: 98 % | HEIGHT: 62 IN | DIASTOLIC BLOOD PRESSURE: 70 MMHG

## 2021-04-22 PROCEDURE — 99213 OFFICE O/P EST LOW 20 MIN: CPT

## 2021-04-22 NOTE — REASON FOR VISIT
[Follow-Up] : a follow-up visit [Pulmonary Embolism] : pulmonary embolism [Shortness of Breath] : shortness of breath

## 2021-04-22 NOTE — HISTORY OF PRESENT ILLNESS
[Follow-Up - Routine Clinic] : a routine clinic follow-up of [Cough] : no cough [Orthopnea] : no orthopnea [Currently Experiencing] : The patient is currently experiencing symptoms. [Shortness of Breath] : Shortness of Breath

## 2021-04-29 ENCOUNTER — RESULT REVIEW (OUTPATIENT)
Age: 76
End: 2021-04-29

## 2021-04-29 ENCOUNTER — OUTPATIENT (OUTPATIENT)
Dept: OUTPATIENT SERVICES | Facility: HOSPITAL | Age: 76
LOS: 1 days | Discharge: HOME | End: 2021-04-29
Payer: MEDICARE

## 2021-04-29 PROCEDURE — 93970 EXTREMITY STUDY: CPT | Mod: 26

## 2021-05-06 DIAGNOSIS — M79.89 OTHER SPECIFIED SOFT TISSUE DISORDERS: ICD-10-CM

## 2021-05-07 ENCOUNTER — RESULT REVIEW (OUTPATIENT)
Age: 76
End: 2021-05-07

## 2021-05-07 ENCOUNTER — APPOINTMENT (OUTPATIENT)
Dept: CARDIOLOGY | Facility: CLINIC | Age: 76
End: 2021-05-07
Payer: MEDICARE

## 2021-05-07 ENCOUNTER — RESULT CHARGE (OUTPATIENT)
Age: 76
End: 2021-05-07

## 2021-05-07 ENCOUNTER — OUTPATIENT (OUTPATIENT)
Dept: OUTPATIENT SERVICES | Facility: HOSPITAL | Age: 76
LOS: 1 days | Discharge: HOME | End: 2021-05-07
Payer: MEDICARE

## 2021-05-07 VITALS
HEART RATE: 85 BPM | WEIGHT: 191 LBS | TEMPERATURE: 98.1 F | DIASTOLIC BLOOD PRESSURE: 70 MMHG | SYSTOLIC BLOOD PRESSURE: 140 MMHG | HEIGHT: 62 IN | BODY MASS INDEX: 35.15 KG/M2

## 2021-05-07 DIAGNOSIS — M25.50 PAIN IN UNSPECIFIED JOINT: ICD-10-CM

## 2021-05-07 PROCEDURE — 93000 ELECTROCARDIOGRAM COMPLETE: CPT

## 2021-05-07 PROCEDURE — 99213 OFFICE O/P EST LOW 20 MIN: CPT

## 2021-05-07 PROCEDURE — 78580 LUNG PERFUSION IMAGING: CPT | Mod: 26

## 2021-05-07 PROCEDURE — 71046 X-RAY EXAM CHEST 2 VIEWS: CPT | Mod: 26

## 2021-05-07 NOTE — ASSESSMENT
[FreeTextEntry1] : Acute PE (11/2020):\par No RV dysfunction.\par \par BP not controlled (repeat 150/82).\par \par \par

## 2021-05-07 NOTE — REASON FOR VISIT
[Follow-Up - Clinic] : a clinic follow-up of [Hypertension] : hypertension [FreeTextEntry1] : Feels well.\par \par Knee pain affecting quality of life.  Plan for surgery.\par \par Has been on prednisone for autoimmune pancreatitis.  Gained 9-lbs.\par \par Home BP's running a bit high.\par \par Mild LE edema.  Worse when on feet prolonged periods.\par \par Labs Reviewed (4/21/21):\par CBC / CMP unremarkable.

## 2021-05-07 NOTE — DISCUSSION/SUMMARY
[FreeTextEntry1] : Cont Amlodipine-Benazepril.\par Cont Bisoprolol.\par Restart HCTZ.\par Cont Eliquis.  Pulmonary follow-up.\par Walks / light exercise.\par Follow-up 6-months.

## 2021-05-10 ENCOUNTER — OUTPATIENT (OUTPATIENT)
Dept: OUTPATIENT SERVICES | Facility: HOSPITAL | Age: 76
LOS: 1 days | Discharge: HOME | End: 2021-05-10

## 2021-05-10 ENCOUNTER — LABORATORY RESULT (OUTPATIENT)
Age: 76
End: 2021-05-10

## 2021-05-10 DIAGNOSIS — Z11.59 ENCOUNTER FOR SCREENING FOR OTHER VIRAL DISEASES: ICD-10-CM

## 2021-05-13 ENCOUNTER — OUTPATIENT (OUTPATIENT)
Dept: OUTPATIENT SERVICES | Facility: HOSPITAL | Age: 76
LOS: 1 days | Discharge: HOME | End: 2021-05-13
Payer: MEDICARE

## 2021-05-13 DIAGNOSIS — R06.02 SHORTNESS OF BREATH: ICD-10-CM

## 2021-05-13 PROCEDURE — 94727 GAS DIL/WSHOT DETER LNG VOL: CPT | Mod: 26

## 2021-05-13 PROCEDURE — 94060 EVALUATION OF WHEEZING: CPT | Mod: 26

## 2021-05-13 PROCEDURE — 94729 DIFFUSING CAPACITY: CPT | Mod: 26

## 2021-06-14 ENCOUNTER — APPOINTMENT (OUTPATIENT)
Age: 76
End: 2021-06-14
Payer: MEDICARE

## 2021-06-14 VITALS
RESPIRATION RATE: 14 BRPM | OXYGEN SATURATION: 97 % | SYSTOLIC BLOOD PRESSURE: 126 MMHG | HEART RATE: 94 BPM | WEIGHT: 191 LBS | BODY MASS INDEX: 35.15 KG/M2 | DIASTOLIC BLOOD PRESSURE: 78 MMHG | HEIGHT: 62 IN

## 2021-06-14 PROCEDURE — 99214 OFFICE O/P EST MOD 30 MIN: CPT

## 2021-06-16 ENCOUNTER — TRANSCRIPTION ENCOUNTER (OUTPATIENT)
Age: 76
End: 2021-06-16

## 2021-06-29 ENCOUNTER — LABORATORY RESULT (OUTPATIENT)
Age: 76
End: 2021-06-29

## 2021-06-29 ENCOUNTER — APPOINTMENT (OUTPATIENT)
Dept: HEMATOLOGY ONCOLOGY | Facility: CLINIC | Age: 76
End: 2021-06-29
Payer: MEDICARE

## 2021-06-29 VITALS
SYSTOLIC BLOOD PRESSURE: 154 MMHG | TEMPERATURE: 98.3 F | HEART RATE: 94 BPM | BODY MASS INDEX: 35.15 KG/M2 | DIASTOLIC BLOOD PRESSURE: 83 MMHG | WEIGHT: 191 LBS | HEIGHT: 62 IN

## 2021-06-29 PROCEDURE — 99214 OFFICE O/P EST MOD 30 MIN: CPT

## 2021-06-30 LAB
ALBUMIN SERPL ELPH-MCNC: 4.3 G/DL
ALP BLD-CCNC: 67 U/L
ALT SERPL-CCNC: 31 U/L
ANION GAP SERPL CALC-SCNC: 13 MMOL/L
AST SERPL-CCNC: 15 U/L
BILIRUB SERPL-MCNC: 0.3 MG/DL
BUN SERPL-MCNC: 16 MG/DL
CALCIUM SERPL-MCNC: 9.7 MG/DL
CARDIOLIPIN AB SER IA-ACNC: POSITIVE
CHLORIDE SERPL-SCNC: 105 MMOL/L
CO2 SERPL-SCNC: 24 MMOL/L
CREAT SERPL-MCNC: 0.9 MG/DL
GLUCOSE SERPL-MCNC: 245 MG/DL
HCT VFR BLD CALC: 40.9 %
HGB BLD-MCNC: 12.4 G/DL
LDH SERPL-CCNC: 404
MCHC RBC-ENTMCNC: 27 PG
MCHC RBC-ENTMCNC: 30.3 G/DL
MCV RBC AUTO: 89.1 FL
PLATELET # BLD AUTO: 318 K/UL
PMV BLD: 9.1 FL
POTASSIUM SERPL-SCNC: 4.2 MMOL/L
PROT SERPL-MCNC: 6.7 G/DL
RBC # BLD: 4.59 M/UL
RBC # FLD: 15.5 %
SODIUM SERPL-SCNC: 142 MMOL/L
WBC # FLD AUTO: 13.08 K/UL

## 2021-07-01 LAB
B2 GLYCOPROT1 IGA SERPL IA-ACNC: <5 SAU
B2 GLYCOPROT1 IGG SER-ACNC: <5 SGU
B2 GLYCOPROT1 IGM SER-ACNC: <5 SMU

## 2021-07-01 NOTE — ASSESSMENT
[FreeTextEntry1] : 76 year old female with autoimmune pancreatitis , ulcerative colitis , inactive on long term steroid therapy ,\par Unprovoked PE with right heart strain in the background  of Covid 19 infection ( six months earlier )and with positive convalescent antibodies . No prior trauma or travel . \par agree with indefinite anticoagulation , avoid prolonged immobilization or long travel . will check APS panel , factor 5 Leiden and prothrombin gene mutation . Will need to hold eliquis for 2 days prior to surgery and resume post operatively . We don't routinely recommend bridging with Lovenox however , given her anxiety and fear of DVT recurrence it is reasonable to give lovenox for 2 days , last dose 24 hours prior to surgery . In addition to routine post operative DVT prophylaxis ( sequentials , lovenox ), early ambulation is recommended. \par All her questions were addressed in detail and to her satisfaction .

## 2021-07-03 LAB
DNA PLOIDY SPEC FC-IMP: NORMAL
PTR INTERP: NORMAL

## 2021-07-11 ENCOUNTER — EMERGENCY (EMERGENCY)
Facility: HOSPITAL | Age: 76
LOS: 0 days | Discharge: HOME | End: 2021-07-12
Attending: EMERGENCY MEDICINE | Admitting: EMERGENCY MEDICINE
Payer: MEDICARE

## 2021-07-11 VITALS
HEART RATE: 93 BPM | HEIGHT: 62 IN | RESPIRATION RATE: 20 BRPM | SYSTOLIC BLOOD PRESSURE: 167 MMHG | WEIGHT: 179.9 LBS | DIASTOLIC BLOOD PRESSURE: 87 MMHG | OXYGEN SATURATION: 95 % | TEMPERATURE: 98 F

## 2021-07-11 DIAGNOSIS — Z79.01 LONG TERM (CURRENT) USE OF ANTICOAGULANTS: ICD-10-CM

## 2021-07-11 DIAGNOSIS — Z79.899 OTHER LONG TERM (CURRENT) DRUG THERAPY: ICD-10-CM

## 2021-07-11 DIAGNOSIS — Z88.1 ALLERGY STATUS TO OTHER ANTIBIOTIC AGENTS STATUS: ICD-10-CM

## 2021-07-11 DIAGNOSIS — Z98.890 OTHER SPECIFIED POSTPROCEDURAL STATES: ICD-10-CM

## 2021-07-11 DIAGNOSIS — Z88.8 ALLERGY STATUS TO OTHER DRUGS, MEDICAMENTS AND BIOLOGICAL SUBSTANCES: ICD-10-CM

## 2021-07-11 DIAGNOSIS — Z87.19 PERSONAL HISTORY OF OTHER DISEASES OF THE DIGESTIVE SYSTEM: ICD-10-CM

## 2021-07-11 DIAGNOSIS — Z88.2 ALLERGY STATUS TO SULFONAMIDES: ICD-10-CM

## 2021-07-11 DIAGNOSIS — I10 ESSENTIAL (PRIMARY) HYPERTENSION: ICD-10-CM

## 2021-07-11 DIAGNOSIS — K21.9 GASTRO-ESOPHAGEAL REFLUX DISEASE WITHOUT ESOPHAGITIS: ICD-10-CM

## 2021-07-11 DIAGNOSIS — N23 UNSPECIFIED RENAL COLIC: ICD-10-CM

## 2021-07-11 DIAGNOSIS — R10.9 UNSPECIFIED ABDOMINAL PAIN: ICD-10-CM

## 2021-07-11 LAB
APPEARANCE UR: ABNORMAL
BILIRUB UR-MCNC: NEGATIVE — SIGNIFICANT CHANGE UP
COLOR SPEC: YELLOW — SIGNIFICANT CHANGE UP
DIFF PNL FLD: ABNORMAL
GLUCOSE UR QL: NEGATIVE MG/DL — SIGNIFICANT CHANGE UP
KETONES UR-MCNC: NEGATIVE — SIGNIFICANT CHANGE UP
LEUKOCYTE ESTERASE UR-ACNC: NEGATIVE — SIGNIFICANT CHANGE UP
NITRITE UR-MCNC: NEGATIVE — SIGNIFICANT CHANGE UP
PH UR: 6 — SIGNIFICANT CHANGE UP (ref 5–8)
PROT UR-MCNC: 30 MG/DL
SP GR SPEC: >=1.03 (ref 1.01–1.03)
UROBILINOGEN FLD QL: 0.2 MG/DL — SIGNIFICANT CHANGE UP (ref 0.2–0.2)

## 2021-07-11 PROCEDURE — 93010 ELECTROCARDIOGRAM REPORT: CPT

## 2021-07-11 PROCEDURE — 99285 EMERGENCY DEPT VISIT HI MDM: CPT

## 2021-07-11 RX ORDER — SODIUM CHLORIDE 9 MG/ML
1000 INJECTION INTRAMUSCULAR; INTRAVENOUS; SUBCUTANEOUS ONCE
Refills: 0 | Status: COMPLETED | OUTPATIENT
Start: 2021-07-11 | End: 2021-07-11

## 2021-07-11 RX ORDER — KETOROLAC TROMETHAMINE 30 MG/ML
15 SYRINGE (ML) INJECTION ONCE
Refills: 0 | Status: DISCONTINUED | OUTPATIENT
Start: 2021-07-11 | End: 2021-07-11

## 2021-07-11 RX ORDER — IOHEXOL 300 MG/ML
30 INJECTION, SOLUTION INTRAVENOUS ONCE
Refills: 0 | Status: COMPLETED | OUTPATIENT
Start: 2021-07-11 | End: 2021-07-12

## 2021-07-11 NOTE — ED ADULT NURSE NOTE - NSIMPLEMENTINTERV_GEN_ALL_ED
Implemented All Universal Safety Interventions:  [place] to call system. Call bell, personal items and telephone within reach. Instruct patient to call for assistance. Room bathroom lighting operational. Non-slip footwear when patient is off stretcher. Physically safe environment: no spills, clutter or unnecessary equipment. Stretcher in lowest position, wheels locked, appropriate side rails in place.

## 2021-07-11 NOTE — ED PROVIDER NOTE - PATIENT PORTAL LINK FT
You can access the FollowMyHealth Patient Portal offered by [facility] by registering at the following website: [email or web address]. By joining [employer]’s FollowMyHealth portal, you will also be able to view your health information using other applications (apps) compatible with our system.

## 2021-07-11 NOTE — ED PROVIDER NOTE - ATTENDING CONTRIBUTION TO CARE
I personally evaluated the patient. I reviewed the Resident’s or Physician Assistant’s note (as assigned above), and agree with the findings and plan except as documented in my note.  Chart reviewed.  H/O ulcerative colitis, S/P ileostomy, kidney stone, presents with right flank pain and urinary frequency.  Exam shows alert patient in no distress, HEENT ncat, lungs clear, RR S1S2, abdomen soft NT +BS, no CCE.

## 2021-07-11 NOTE — ED ADULT TRIAGE NOTE - ACCOMPANIED BY
Immediate family member Cimetidine Pregnancy And Lactation Text: This medication is Pregnancy Category B and is considered safe during pregnancy. It is also excreted in breast milk and breast feeding isn't recommended.

## 2021-07-11 NOTE — ED PROVIDER NOTE - OBJECTIVE STATEMENT
77 yo female hx of HTN/pancreatitis/ UC s/p total colectomy with ileostomy present c/o right flank pain since 9 pm pain is sharp and similar to her prior kidney stone pain. denies dysuria/urgency/frequency. denies vaginal discharge/lesion/bleeding Denies fever/chill/HA/dizziness/chest pain/palpitation/sob/abd pain/n/v/d/ black stool/bloody stool

## 2021-07-11 NOTE — ED PROVIDER NOTE - CARE PROVIDER_API CALL
Destiney Cates)  Urology  [address] [place] [address] [address]  [place] [address]  Phone: [phone]  Fax: [phone]  Follow Up Time:     Gerber Cerda)  Urology  [address] [place] [address], [address]  [place] [address]  Phone: [phone]  Fax: [phone]  Follow Up Time:

## 2021-07-11 NOTE — ED PROVIDER NOTE - CLINICAL SUMMARY MEDICAL DECISION MAKING FREE TEXT BOX
Labs unremarkable.  UA negative.  CT abdo small right UVJ stone.  Given IVF, morphine and toradol with relief.  Will D/C to follow up with urology.

## 2021-07-11 NOTE — ED PROVIDER NOTE - CARE PROVIDERS DIRECT ADDRESSES
,[email or web address]

## 2021-07-12 VITALS
TEMPERATURE: 97 F | DIASTOLIC BLOOD PRESSURE: 77 MMHG | HEART RATE: 80 BPM | SYSTOLIC BLOOD PRESSURE: 160 MMHG | RESPIRATION RATE: 20 BRPM | OXYGEN SATURATION: 96 %

## 2021-07-12 DIAGNOSIS — Z90.49 ACQUIRED ABSENCE OF OTHER SPECIFIED PARTS OF DIGESTIVE TRACT: Chronic | ICD-10-CM

## 2021-07-12 DIAGNOSIS — Z93.2 ILEOSTOMY STATUS: Chronic | ICD-10-CM

## 2021-07-12 LAB
ALBUMIN SERPL ELPH-MCNC: 4.2 G/DL — SIGNIFICANT CHANGE UP (ref 3.5–5.2)
ALP SERPL-CCNC: 68 U/L — SIGNIFICANT CHANGE UP (ref 30–115)
ALT FLD-CCNC: 33 U/L — SIGNIFICANT CHANGE UP (ref 0–41)
ANION GAP SERPL CALC-SCNC: 13 MMOL/L — SIGNIFICANT CHANGE UP (ref 7–14)
AST SERPL-CCNC: 26 U/L — SIGNIFICANT CHANGE UP (ref 0–41)
BACTERIA # UR AUTO: ABNORMAL
BASOPHILS # BLD AUTO: 0.06 K/UL — SIGNIFICANT CHANGE UP (ref 0–0.2)
BASOPHILS NFR BLD AUTO: 0.5 % — SIGNIFICANT CHANGE UP (ref 0–1)
BILIRUB SERPL-MCNC: 0.3 MG/DL — SIGNIFICANT CHANGE UP (ref 0.2–1.2)
BUN SERPL-MCNC: 20 MG/DL — SIGNIFICANT CHANGE UP (ref 10–20)
CALCIUM SERPL-MCNC: 10.5 MG/DL — HIGH (ref 8.5–10.1)
CHLORIDE SERPL-SCNC: 103 MMOL/L — SIGNIFICANT CHANGE UP (ref 98–110)
CO2 SERPL-SCNC: 25 MMOL/L — SIGNIFICANT CHANGE UP (ref 17–32)
CREAT SERPL-MCNC: 1.1 MG/DL — SIGNIFICANT CHANGE UP (ref 0.7–1.5)
EOSINOPHIL # BLD AUTO: 0.01 K/UL — SIGNIFICANT CHANGE UP (ref 0–0.7)
EOSINOPHIL NFR BLD AUTO: 0.1 % — SIGNIFICANT CHANGE UP (ref 0–8)
EPI CELLS # UR: ABNORMAL /HPF
GLUCOSE SERPL-MCNC: 155 MG/DL — HIGH (ref 70–99)
HCT VFR BLD CALC: 40.9 % — SIGNIFICANT CHANGE UP (ref 37–47)
HGB BLD-MCNC: 12.6 G/DL — SIGNIFICANT CHANGE UP (ref 12–16)
IMM GRANULOCYTES NFR BLD AUTO: 2.1 % — HIGH (ref 0.1–0.3)
LACTATE SERPL-SCNC: 2.3 MMOL/L — HIGH (ref 0.7–2)
LIDOCAIN IGE QN: 43 U/L — SIGNIFICANT CHANGE UP (ref 7–60)
LYMPHOCYTES # BLD AUTO: 1.39 K/UL — SIGNIFICANT CHANGE UP (ref 1.2–3.4)
LYMPHOCYTES # BLD AUTO: 10.6 % — LOW (ref 20.5–51.1)
MCHC RBC-ENTMCNC: 26.9 PG — LOW (ref 27–31)
MCHC RBC-ENTMCNC: 30.8 G/DL — LOW (ref 32–37)
MCV RBC AUTO: 87.4 FL — SIGNIFICANT CHANGE UP (ref 81–99)
MONOCYTES # BLD AUTO: 0.66 K/UL — HIGH (ref 0.1–0.6)
MONOCYTES NFR BLD AUTO: 5 % — SIGNIFICANT CHANGE UP (ref 1.7–9.3)
NEUTROPHILS # BLD AUTO: 10.71 K/UL — HIGH (ref 1.4–6.5)
NEUTROPHILS NFR BLD AUTO: 81.7 % — HIGH (ref 42.2–75.2)
NRBC # BLD: 0 /100 WBCS — SIGNIFICANT CHANGE UP (ref 0–0)
PLATELET # BLD AUTO: 336 K/UL — SIGNIFICANT CHANGE UP (ref 130–400)
POTASSIUM SERPL-MCNC: 4.6 MMOL/L — SIGNIFICANT CHANGE UP (ref 3.5–5)
POTASSIUM SERPL-SCNC: 4.6 MMOL/L — SIGNIFICANT CHANGE UP (ref 3.5–5)
PROT SERPL-MCNC: 6.8 G/DL — SIGNIFICANT CHANGE UP (ref 6–8)
RBC # BLD: 4.68 M/UL — SIGNIFICANT CHANGE UP (ref 4.2–5.4)
RBC # FLD: 15.4 % — HIGH (ref 11.5–14.5)
RBC CASTS # UR COMP ASSIST: ABNORMAL /HPF
SODIUM SERPL-SCNC: 141 MMOL/L — SIGNIFICANT CHANGE UP (ref 135–146)
URATE CRY FLD QL MICRO: ABNORMAL /HPF
WBC # BLD: 13.1 K/UL — HIGH (ref 4.8–10.8)
WBC # FLD AUTO: 13.1 K/UL — HIGH (ref 4.8–10.8)
WBC UR QL: SIGNIFICANT CHANGE UP /HPF

## 2021-07-12 PROCEDURE — 74177 CT ABD & PELVIS W/CONTRAST: CPT | Mod: 26,MA

## 2021-07-12 RX ORDER — MORPHINE SULFATE 50 MG/1
4 CAPSULE, EXTENDED RELEASE ORAL ONCE
Refills: 0 | Status: DISCONTINUED | OUTPATIENT
Start: 2021-07-12 | End: 2021-07-12

## 2021-07-12 RX ADMIN — SODIUM CHLORIDE 1000 MILLILITER(S): 9 INJECTION INTRAMUSCULAR; INTRAVENOUS; SUBCUTANEOUS at 00:43

## 2021-07-12 RX ADMIN — MORPHINE SULFATE 4 MILLIGRAM(S): 50 CAPSULE, EXTENDED RELEASE ORAL at 04:18

## 2021-07-12 RX ADMIN — IOHEXOL 30 MILLILITER(S): 300 INJECTION, SOLUTION INTRAVENOUS at 00:44

## 2021-07-12 RX ADMIN — MORPHINE SULFATE 4 MILLIGRAM(S): 50 CAPSULE, EXTENDED RELEASE ORAL at 03:15

## 2021-07-12 RX ADMIN — Medication 15 MILLIGRAM(S): at 00:44

## 2021-07-15 ENCOUNTER — EMERGENCY (EMERGENCY)
Facility: HOSPITAL | Age: 76
LOS: 0 days | Discharge: HOME | End: 2021-07-16
Attending: EMERGENCY MEDICINE | Admitting: EMERGENCY MEDICINE
Payer: MEDICARE

## 2021-07-15 VITALS
RESPIRATION RATE: 18 BRPM | TEMPERATURE: 97 F | SYSTOLIC BLOOD PRESSURE: 157 MMHG | DIASTOLIC BLOOD PRESSURE: 83 MMHG | OXYGEN SATURATION: 97 % | WEIGHT: 179.9 LBS | HEART RATE: 81 BPM | HEIGHT: 62 IN

## 2021-07-15 VITALS — WEIGHT: 179.9 LBS | HEIGHT: 62 IN

## 2021-07-15 DIAGNOSIS — K21.9 GASTRO-ESOPHAGEAL REFLUX DISEASE WITHOUT ESOPHAGITIS: ICD-10-CM

## 2021-07-15 DIAGNOSIS — Z88.2 ALLERGY STATUS TO SULFONAMIDES: ICD-10-CM

## 2021-07-15 DIAGNOSIS — Z20.822 CONTACT WITH AND (SUSPECTED) EXPOSURE TO COVID-19: ICD-10-CM

## 2021-07-15 DIAGNOSIS — Z87.19 PERSONAL HISTORY OF OTHER DISEASES OF THE DIGESTIVE SYSTEM: ICD-10-CM

## 2021-07-15 DIAGNOSIS — Z79.01 LONG TERM (CURRENT) USE OF ANTICOAGULANTS: ICD-10-CM

## 2021-07-15 DIAGNOSIS — Z93.2 ILEOSTOMY STATUS: Chronic | ICD-10-CM

## 2021-07-15 DIAGNOSIS — R82.79 OTHER ABNORMAL FINDINGS ON MICROBIOLOGICAL EXAMINATION OF URINE: ICD-10-CM

## 2021-07-15 DIAGNOSIS — Z79.899 OTHER LONG TERM (CURRENT) DRUG THERAPY: ICD-10-CM

## 2021-07-15 DIAGNOSIS — Z90.49 ACQUIRED ABSENCE OF OTHER SPECIFIED PARTS OF DIGESTIVE TRACT: Chronic | ICD-10-CM

## 2021-07-15 DIAGNOSIS — R35.0 FREQUENCY OF MICTURITION: ICD-10-CM

## 2021-07-15 DIAGNOSIS — R74.02 ELEVATION OF LEVELS OF LACTIC ACID DEHYDROGENASE [LDH]: ICD-10-CM

## 2021-07-15 DIAGNOSIS — N20.0 CALCULUS OF KIDNEY: ICD-10-CM

## 2021-07-15 DIAGNOSIS — Z88.8 ALLERGY STATUS TO OTHER DRUGS, MEDICAMENTS AND BIOLOGICAL SUBSTANCES: ICD-10-CM

## 2021-07-15 DIAGNOSIS — I10 ESSENTIAL (PRIMARY) HYPERTENSION: ICD-10-CM

## 2021-07-15 DIAGNOSIS — Z88.1 ALLERGY STATUS TO OTHER ANTIBIOTIC AGENTS STATUS: ICD-10-CM

## 2021-07-15 DIAGNOSIS — Z86.711 PERSONAL HISTORY OF PULMONARY EMBOLISM: ICD-10-CM

## 2021-07-15 DIAGNOSIS — Z87.442 PERSONAL HISTORY OF URINARY CALCULI: ICD-10-CM

## 2021-07-15 LAB
-  AMIKACIN: SIGNIFICANT CHANGE UP
-  AMOXICILLIN/CLAVULANIC ACID: SIGNIFICANT CHANGE UP
-  AMPICILLIN/SULBACTAM: SIGNIFICANT CHANGE UP
-  AMPICILLIN: SIGNIFICANT CHANGE UP
-  AZTREONAM: SIGNIFICANT CHANGE UP
-  CEFAZOLIN: SIGNIFICANT CHANGE UP
-  CEFEPIME: SIGNIFICANT CHANGE UP
-  CEFOXITIN: SIGNIFICANT CHANGE UP
-  CEFTRIAXONE: SIGNIFICANT CHANGE UP
-  CIPROFLOXACIN: SIGNIFICANT CHANGE UP
-  ERTAPENEM: SIGNIFICANT CHANGE UP
-  GENTAMICIN: SIGNIFICANT CHANGE UP
-  LEVOFLOXACIN: SIGNIFICANT CHANGE UP
-  MEROPENEM: SIGNIFICANT CHANGE UP
-  NITROFURANTOIN: SIGNIFICANT CHANGE UP
-  PIPERACILLIN/TAZOBACTAM: SIGNIFICANT CHANGE UP
-  TOBRAMYCIN: SIGNIFICANT CHANGE UP
-  TRIMETHOPRIM/SULFAMETHOXAZOLE: SIGNIFICANT CHANGE UP
ALBUMIN SERPL ELPH-MCNC: 4.3 G/DL — SIGNIFICANT CHANGE UP (ref 3.5–5.2)
ALP SERPL-CCNC: 71 U/L — SIGNIFICANT CHANGE UP (ref 30–115)
ALT FLD-CCNC: 36 U/L — SIGNIFICANT CHANGE UP (ref 0–41)
ANION GAP SERPL CALC-SCNC: 15 MMOL/L — HIGH (ref 7–14)
APPEARANCE UR: CLEAR — SIGNIFICANT CHANGE UP
AST SERPL-CCNC: 34 U/L — SIGNIFICANT CHANGE UP (ref 0–41)
BASE EXCESS BLDV CALC-SCNC: 0.7 MMOL/L — SIGNIFICANT CHANGE UP (ref -2–2)
BASOPHILS # BLD AUTO: 0.09 K/UL — SIGNIFICANT CHANGE UP (ref 0–0.2)
BASOPHILS NFR BLD AUTO: 0.7 % — SIGNIFICANT CHANGE UP (ref 0–1)
BILIRUB SERPL-MCNC: 0.3 MG/DL — SIGNIFICANT CHANGE UP (ref 0.2–1.2)
BILIRUB UR-MCNC: NEGATIVE — SIGNIFICANT CHANGE UP
BUN SERPL-MCNC: 18 MG/DL — SIGNIFICANT CHANGE UP (ref 10–20)
CA-I SERPL-SCNC: 1.34 MMOL/L — HIGH (ref 1.12–1.3)
CALCIUM SERPL-MCNC: 10.4 MG/DL — HIGH (ref 8.5–10.1)
CHLORIDE SERPL-SCNC: 103 MMOL/L — SIGNIFICANT CHANGE UP (ref 98–110)
CO2 SERPL-SCNC: 24 MMOL/L — SIGNIFICANT CHANGE UP (ref 17–32)
COLOR SPEC: YELLOW — SIGNIFICANT CHANGE UP
CREAT SERPL-MCNC: 1.1 MG/DL — SIGNIFICANT CHANGE UP (ref 0.7–1.5)
CULTURE RESULTS: SIGNIFICANT CHANGE UP
DIFF PNL FLD: NEGATIVE — SIGNIFICANT CHANGE UP
EOSINOPHIL # BLD AUTO: 0.04 K/UL — SIGNIFICANT CHANGE UP (ref 0–0.7)
EOSINOPHIL NFR BLD AUTO: 0.3 % — SIGNIFICANT CHANGE UP (ref 0–8)
GAS PNL BLDV: 150 MMOL/L — HIGH (ref 136–145)
GAS PNL BLDV: SIGNIFICANT CHANGE UP
GLUCOSE SERPL-MCNC: 134 MG/DL — HIGH (ref 70–99)
GLUCOSE UR QL: NEGATIVE MG/DL — SIGNIFICANT CHANGE UP
HCO3 BLDV-SCNC: 29 MMOL/L — SIGNIFICANT CHANGE UP (ref 22–29)
HCT VFR BLD CALC: 42.5 % — SIGNIFICANT CHANGE UP (ref 37–47)
HCT VFR BLDA CALC: 39.9 % — SIGNIFICANT CHANGE UP (ref 34–44)
HGB BLD CALC-MCNC: 13 G/DL — LOW (ref 14–18)
HGB BLD-MCNC: 12.7 G/DL — SIGNIFICANT CHANGE UP (ref 12–16)
HOROWITZ INDEX BLDV+IHG-RTO: 21 — SIGNIFICANT CHANGE UP
IMM GRANULOCYTES NFR BLD AUTO: 1.3 % — HIGH (ref 0.1–0.3)
KETONES UR-MCNC: NEGATIVE — SIGNIFICANT CHANGE UP
LACTATE BLDV-MCNC: 4.1 MMOL/L — HIGH (ref 0.5–1.6)
LACTATE SERPL-SCNC: 1.3 MMOL/L — SIGNIFICANT CHANGE UP (ref 0.7–2)
LACTATE SERPL-SCNC: 4.2 MMOL/L — CRITICAL HIGH (ref 0.7–2)
LEUKOCYTE ESTERASE UR-ACNC: NEGATIVE — SIGNIFICANT CHANGE UP
LIDOCAIN IGE QN: 35 U/L — SIGNIFICANT CHANGE UP (ref 7–60)
LYMPHOCYTES # BLD AUTO: 0.96 K/UL — LOW (ref 1.2–3.4)
LYMPHOCYTES # BLD AUTO: 7.5 % — LOW (ref 20.5–51.1)
MCHC RBC-ENTMCNC: 26.6 PG — LOW (ref 27–31)
MCHC RBC-ENTMCNC: 29.9 G/DL — LOW (ref 32–37)
MCV RBC AUTO: 88.9 FL — SIGNIFICANT CHANGE UP (ref 81–99)
METHOD TYPE: SIGNIFICANT CHANGE UP
MONOCYTES # BLD AUTO: 0.38 K/UL — SIGNIFICANT CHANGE UP (ref 0.1–0.6)
MONOCYTES NFR BLD AUTO: 3 % — SIGNIFICANT CHANGE UP (ref 1.7–9.3)
NEUTROPHILS # BLD AUTO: 11.14 K/UL — HIGH (ref 1.4–6.5)
NEUTROPHILS NFR BLD AUTO: 87.2 % — HIGH (ref 42.2–75.2)
NITRITE UR-MCNC: NEGATIVE — SIGNIFICANT CHANGE UP
NRBC # BLD: 0 /100 WBCS — SIGNIFICANT CHANGE UP (ref 0–0)
ORGANISM # SPEC MICROSCOPIC CNT: SIGNIFICANT CHANGE UP
ORGANISM # SPEC MICROSCOPIC CNT: SIGNIFICANT CHANGE UP
PCO2 BLDV: 61 MMHG — HIGH (ref 41–51)
PH BLDV: 7.28 — SIGNIFICANT CHANGE UP (ref 7.26–7.43)
PH UR: 6 — SIGNIFICANT CHANGE UP (ref 5–8)
PLATELET # BLD AUTO: 333 K/UL — SIGNIFICANT CHANGE UP (ref 130–400)
PO2 BLDV: 35 MMHG — SIGNIFICANT CHANGE UP (ref 20–40)
POTASSIUM BLDV-SCNC: 5.2 MMOL/L — SIGNIFICANT CHANGE UP (ref 3.3–5.6)
POTASSIUM SERPL-MCNC: 5.4 MMOL/L — HIGH (ref 3.5–5)
POTASSIUM SERPL-SCNC: 5.4 MMOL/L — HIGH (ref 3.5–5)
PROT SERPL-MCNC: 7.3 G/DL — SIGNIFICANT CHANGE UP (ref 6–8)
PROT UR-MCNC: NEGATIVE MG/DL — SIGNIFICANT CHANGE UP
RBC # BLD: 4.78 M/UL — SIGNIFICANT CHANGE UP (ref 4.2–5.4)
RBC # FLD: 15.2 % — HIGH (ref 11.5–14.5)
SAO2 % BLDV: 54 % — SIGNIFICANT CHANGE UP
SARS-COV-2 RNA SPEC QL NAA+PROBE: SIGNIFICANT CHANGE UP
SODIUM SERPL-SCNC: 142 MMOL/L — SIGNIFICANT CHANGE UP (ref 135–146)
SP GR SPEC: 1.01 — SIGNIFICANT CHANGE UP (ref 1.01–1.03)
SPECIMEN SOURCE: SIGNIFICANT CHANGE UP
UROBILINOGEN FLD QL: 0.2 MG/DL — SIGNIFICANT CHANGE UP (ref 0.2–0.2)
WBC # BLD: 12.77 K/UL — HIGH (ref 4.8–10.8)
WBC # FLD AUTO: 12.77 K/UL — HIGH (ref 4.8–10.8)

## 2021-07-15 PROCEDURE — 99285 EMERGENCY DEPT VISIT HI MDM: CPT

## 2021-07-15 PROCEDURE — 74177 CT ABD & PELVIS W/CONTRAST: CPT | Mod: 26,MA

## 2021-07-15 RX ORDER — CEFPODOXIME PROXETIL 100 MG
200 TABLET ORAL ONCE
Refills: 0 | Status: COMPLETED | OUTPATIENT
Start: 2021-07-15 | End: 2021-07-15

## 2021-07-15 RX ORDER — SODIUM CHLORIDE 9 MG/ML
500 INJECTION, SOLUTION INTRAVENOUS ONCE
Refills: 0 | Status: COMPLETED | OUTPATIENT
Start: 2021-07-15 | End: 2021-07-15

## 2021-07-15 RX ORDER — AMLODIPINE BESYLATE AND BENAZEPRIL HYDROCHLORIDE 10; 20 MG/1; MG/1
1 CAPSULE ORAL
Qty: 0 | Refills: 0 | DISCHARGE

## 2021-07-15 RX ORDER — CEFPODOXIME PROXETIL 100 MG
1 TABLET ORAL
Qty: 20 | Refills: 0
Start: 2021-07-15 | End: 2021-07-24

## 2021-07-15 RX ORDER — SODIUM CHLORIDE 9 MG/ML
1000 INJECTION, SOLUTION INTRAVENOUS ONCE
Refills: 0 | Status: COMPLETED | OUTPATIENT
Start: 2021-07-15 | End: 2021-07-15

## 2021-07-15 RX ORDER — IOHEXOL 300 MG/ML
30 INJECTION, SOLUTION INTRAVENOUS ONCE
Refills: 0 | Status: COMPLETED | OUTPATIENT
Start: 2021-07-15 | End: 2021-07-15

## 2021-07-15 RX ORDER — LANSOPRAZOLE 15 MG/1
1 CAPSULE, DELAYED RELEASE ORAL
Qty: 0 | Refills: 0 | DISCHARGE

## 2021-07-15 RX ADMIN — IOHEXOL 30 MILLILITER(S): 300 INJECTION, SOLUTION INTRAVENOUS at 19:12

## 2021-07-15 RX ADMIN — Medication 200 MILLIGRAM(S): at 18:06

## 2021-07-15 RX ADMIN — SODIUM CHLORIDE 1000 MILLILITER(S): 9 INJECTION, SOLUTION INTRAVENOUS at 15:26

## 2021-07-15 RX ADMIN — SODIUM CHLORIDE 500 MILLILITER(S): 9 INJECTION, SOLUTION INTRAVENOUS at 19:12

## 2021-07-15 NOTE — ED PROVIDER NOTE - CARE PROVIDERS DIRECT ADDRESSES
,[email or web address]

## 2021-07-15 NOTE — PHARMACOTHERAPY INTERVENTION NOTE - COMMENTS
Spoke to LELE COBURN: Allergy for Ceftin-PA is aware and said it's ok to give Vantin. Not a true allergy.

## 2021-07-15 NOTE — ED PROVIDER NOTE - NS ED ROS FT
CONST: No fever, chills or bodyaches  EYES: No pain, redness, drainage or visual changes.  ENT: No ear pain or discharge, nasal discharge or congestion. No sore throat  CARD: No chest pain, palpitations  RESP: No SOB, cough, hemoptysis. No hx of asthma or COPD  GI: No abdominal pain, N/V/D  : (+) urinary frequency.   MS: No joint pain, back pain or extremity pain/injury  SKIN: No rashes  NEURO: No headache, dizziness, paresthesias or LOC

## 2021-07-15 NOTE — ED PROVIDER NOTE - PROGRESS NOTE DETAILS
pt was able to tolerate amox 2 years ago with dentist, doesn't report known pencillin allergy pt with persistent lactic acid, will give additional IVF, and re-eval pt seen by urology, can be dc'ed from urological perspective s/o re-eval and re-asses FF: pt repeat lactate within normal limits. discussed radiology and lab results with pt. advised of strict return precautions discussed at bedside. pt given cefpodoxime in the ED without allergic reaction. rx abx to the pharmacy. agreeable to emory.

## 2021-07-15 NOTE — ED PROVIDER NOTE - PATIENT PORTAL LINK FT
You can access the FollowMyHealth Patient Portal offered by [facility] by registering at the following website: [email or web address]. By joining [employer]’s FollowMyHealth portal, you will also be able to view your health information using other applications (apps) compatible with our system.

## 2021-07-15 NOTE — ED ADULT NURSE NOTE - CHPI ED NUR SYMPTOMS POS
patient states she was called back today for positive culture results, denies any symptoms or complaints

## 2021-07-15 NOTE — ED POST DISCHARGE NOTE - RESULT SUMMARY
+ UCX: ALLERGY: CEFTIN, CIPRO, BACTRIM: ASK IF PATIENT CAN TAKE PCN, IF SO RX AMOXICILLIN, IF NOT, WILL ADVISE RETURN TO ED FOR ADMISSION.

## 2021-07-15 NOTE — ED PROVIDER NOTE - ATTENDING CONTRIBUTION TO CARE
75 y/o female with a PMH of PE on eliquis, autoimmune pancreatitis on prednisone, kidney stones, HTN, and UC s/p total colectomy with ileostomy here for eval of positive urince culture. pt had right sided kidney stone, dc'ed but urine culture now + for proteus. The pt was called back as ? if pt will not be allergic to any po abx that she is susceptible to. Pt has no fever, chills, pain is resolved. pt does report urinary frequency. on exam pt no distress s1s2 ctab soft nt/nd.     impression ? uti with kidney stone. pt well appearing. plan for labs, discuss with urology.

## 2021-07-15 NOTE — ED PROVIDER NOTE - CARE PROVIDER_API CALL
Brandy Pacheco)  Urology  [address] [place] [address], [address]  [place] [address]  Phone: [phone]  Fax: [phone]  Follow Up Time: 1-3 Days   Brandy Pacheco)  Urology  [address] [place] [address], [address] [place] [address]  Phone: [phone]  Fax: [phone]  Follow Up Time: 1-3 Days    Gerber Cerda)  Urology  [address] [place] [address], [place] [address]  Phone: [phone]  Fax: [phone]  Follow Up Time: 1-3 Days

## 2021-07-15 NOTE — ED PROVIDER NOTE - NSCAREINITIATED _GEN_ER
Surgery Progress Note    Overnight Events:  NAEON   Nausea an vomiting resolved   Tolerating CLD  No significant cellulitis of anterior abdominal wall     Physical Exam:  Temp:  [97.8 °F (36.6 °C)-99.5 °F (37.5 °C)] 98.2 °F (36.8 °C)  Pulse:  [63-80] 68  Resp:  [16-18] 16  SpO2:  [96 %-98 %] 98 %  BP: (122-167)/(70-89) 166/74    Gen: NAD, AAOx3  CV: RRR  Pulm: unlabored breathing  Abd: Abd soft; no cellulitic changes at this time; Leakage from around drain site  Skin: several areas with oozing tube feeds   Mood: appropriate    I/O last 3 completed shifts:  In: 2595 [P.O.:670; I.V.:1225; IV Piggyback:700]  Out: 2870 [Urine:2750; Drains:120]          Labs:  Recent Labs      03/06/18   2059  03/07/18   0455  03/08/18   0441   WBC  4.53  4.39  8.25   HGB  9.1*  8.6*  9.4*   HCT  30.4*  30.0*  31.2*   PLT  438*  365*  410*   NA  137  139  135*   K  3.8  4.5  4.8   PHOS  3.5   --    --    MG  1.6   --    --    CALCIUM  9.0  8.6*  8.8   BUN  11  8  6*   CREATININE  0.8  0.8  0.7       A/P:  63F w/ concern for dislodged G-tube, originally placed laparoscopically on 2/28/18  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

## 2021-07-15 NOTE — ED PROVIDER NOTE - OBJECTIVE STATEMENT
77 y/o female with a PMH of PE on eliquis, autoimmune pancreatitis on prednisone, kidney stones, HTN, and UC s/p total colectomy with ileostomy present to the ED after being called back by the hospital for positive urine culture with right kidney stone after being seen in the ED 07/11. pt has an appt with Dr. Cerda in 09/2021. pt reports she has urinary frequency. pt denies fever, chills, n/v/d/c, burning or pain with urination, blood in the urine, current flank pain, hx of abdominal surgeries, chest pain, sob, or weakness.

## 2021-07-15 NOTE — ED PROVIDER NOTE - CLINICAL SUMMARY MEDICAL DECISION MAKING FREE TEXT BOX
patient with positive cultures recalled to ED, feels great, PE as above, labs and studies reviewed (had prolonged ED course due to elevated lactate which was possibly spurious as taken with difficulty from line), no adverse rx to cephalosporin, will d/c on same to f/u with . Patient counseled regarding conditions which should prompt return.

## 2021-07-15 NOTE — ED PROVIDER NOTE - PROVIDER TOKENS
PROVIDER:[TOKEN:[[id number]],FOLLOWUP:[1-3 Days]] PROVIDER:[TOKEN:[[id number]],FOLLOWUP:[1-3 Days]],PROVIDER:[TOKEN:[[id number]],FOLLOWUP:[1-3 Days]]

## 2021-07-15 NOTE — ED PROVIDER NOTE - PHYSICAL EXAMINATION
Physical Exam    Vital Signs: I have reviewed the initial vital signs.  Constitutional: well-nourished, appears stated age, no acute distress  Eyes: Conjunctiva pink, Sclera clear  Cardiovascular: S1 and S2, regular rate, regular rhythm, well-perfused extremities, radial pulses equal and 2+ b/l.   Respiratory: unlabored respiratory effort, clear to auscultation bilaterally no wheezing, rales and rhonchi. pt is speaking full sentences. no accessory muscle use.   Gastrointestinal: soft, non-tender, nondistended abdomen, no pulsatile mass, normal bowl sounds, no rebound, no guarding, no organomegaly. no cva tenderness.   Musculoskeletal: supple neck, no lower extremity edema, no calf tenderness, no midline tenderness, no palpable spinal step offs  Integumentary: warm, dry, no rash  Neurologic: awake, alert, cranial nerves II-XII grossly intact, extremities’ motor and sensory functions grossly intact.  Psychiatric: appropriate mood, appropriate affect

## 2021-07-15 NOTE — ED ADULT NURSE NOTE - NSIMPLEMENTINTERV_GEN_ALL_ED
Implemented All Universal Safety Interventions:  [place] to call system. Call bell, personal items and telephone within reach. Instruct patient to call for assistance. Room bathroom lighting operational. Non-slip footwear when patient is off stretcher. Physically safe environment: no spills, clutter or unnecessary equipment. Stretcher in lowest position, wheels locked, appropriate side rails in place.

## 2021-07-16 LAB
CULTURE RESULTS: SIGNIFICANT CHANGE UP
SPECIMEN SOURCE: SIGNIFICANT CHANGE UP

## 2021-07-27 ENCOUNTER — APPOINTMENT (OUTPATIENT)
Dept: UROLOGY | Facility: CLINIC | Age: 76
End: 2021-07-27
Payer: MEDICARE

## 2021-07-27 DIAGNOSIS — Z87.442 PERSONAL HISTORY OF URINARY CALCULI: ICD-10-CM

## 2021-07-27 PROCEDURE — 99204 OFFICE O/P NEW MOD 45 MIN: CPT

## 2021-07-27 NOTE — PHYSICAL EXAM
[General Appearance - In No Acute Distress] : no acute distress [] : no respiratory distress [Skin Color & Pigmentation] : normal skin color and pigmentation [Oriented To Time, Place, And Person] : oriented to person, place, and time [FreeTextEntry1] : ambulates with cane.

## 2021-07-27 NOTE — HISTORY OF PRESENT ILLNESS
[FreeTextEntry1] : Teresita Biggs is a 76 year old female recent ER visit for Kidney stones. Patient also had a Urinary Tract Infection diagnosed on culture. Patient was asymptomatic at the time. Due to multiple allergies patient was called back to ED for antibiotic treatment and monitoring. ER course was prolonged due to elevated Lactate level which repeat was normal and patient was discharged with cephalosporin which patient has completed. Urine Culture 07/15/2021 during second ER visit showed normal urogenital ashley. \par Patient presents to office today for follow up. Patient reports that she feels well. Denies dysuria and gross hematuria. \par Patient has history of microscopic hematuria work up in 2013 was negative. \par \par CT scan 07/12/2021\par -Obstructing R UVJ punctate calculus mild R hydronephrosis.  images reviewed\par \par CT scan 07/15/2021\par -No evidence of R hydronephrosis. No evidence of punctate calculus. images reviewed

## 2021-07-27 NOTE — ASSESSMENT
[FreeTextEntry1] : 76 year old with recent passage of kidney stone. \par ER records and imaging reviewed\par Currently asymptomatic. Will get Metabolic Stone work up. \par \par Plan\par -Parathyroid hormone\par -Uric Acid serum\par -Calcium Serum\par -24 hour urine litholink\par -Follow up 6 weeks to review.

## 2021-07-27 NOTE — END OF VISIT
[FreeTextEntry3] : I participated in obtaining the history, physical exam and formulating the treatment plan and agree with the above transcription by the physician's assistant

## 2021-08-06 ENCOUNTER — NON-APPOINTMENT (OUTPATIENT)
Age: 76
End: 2021-08-06

## 2021-09-02 ENCOUNTER — LABORATORY RESULT (OUTPATIENT)
Age: 76
End: 2021-09-02

## 2021-09-02 ENCOUNTER — OUTPATIENT (OUTPATIENT)
Dept: OUTPATIENT SERVICES | Facility: HOSPITAL | Age: 76
LOS: 1 days | Discharge: HOME | End: 2021-09-02

## 2021-09-02 ENCOUNTER — APPOINTMENT (OUTPATIENT)
Dept: HEMATOLOGY ONCOLOGY | Facility: CLINIC | Age: 76
End: 2021-09-02
Payer: MEDICARE

## 2021-09-02 VITALS
BODY MASS INDEX: 36.07 KG/M2 | WEIGHT: 196 LBS | HEART RATE: 82 BPM | HEIGHT: 62 IN | SYSTOLIC BLOOD PRESSURE: 147 MMHG | DIASTOLIC BLOOD PRESSURE: 83 MMHG | RESPIRATION RATE: 14 BRPM | TEMPERATURE: 97.1 F

## 2021-09-02 DIAGNOSIS — I26.99 OTHER PULMONARY EMBOLISM WITHOUT ACUTE COR PULMONALE: ICD-10-CM

## 2021-09-02 DIAGNOSIS — Z90.49 ACQUIRED ABSENCE OF OTHER SPECIFIED PARTS OF DIGESTIVE TRACT: Chronic | ICD-10-CM

## 2021-09-02 DIAGNOSIS — Z93.2 ILEOSTOMY STATUS: Chronic | ICD-10-CM

## 2021-09-02 PROCEDURE — 99213 OFFICE O/P EST LOW 20 MIN: CPT

## 2021-09-03 LAB
24R-OH-CALCIDIOL SERPL-MCNC: 54.3 PG/ML
25(OH)D3 SERPL-MCNC: 36 NG/ML
CALCIUM SERPL-MCNC: 9.8 MG/DL
DEPRECATED KAPPA LC FREE/LAMBDA SER: 1.03 RATIO
HAPTOGLOB SERPL-MCNC: 269 MG/DL
HCT VFR BLD CALC: 40 %
HGB BLD-MCNC: 12.1 G/DL
KAPPA LC CSF-MCNC: 0.96 MG/DL
KAPPA LC SERPL-MCNC: 0.99 MG/DL
LDH SERPL-CCNC: 334
MCHC RBC-ENTMCNC: 26.4 PG
MCHC RBC-ENTMCNC: 30.3 G/DL
MCV RBC AUTO: 87.3 FL
PARATHYROID HORMONE INTACT: 20 PG/ML
PLATELET # BLD AUTO: 339 K/UL
PMV BLD: 8.8 FL
RBC # BLD: 4.58 M/UL
RBC # FLD: 15.1 %
RETICS # AUTO: 1.9 %
RETICS AGGREG/RBC NFR: 84.7 K/UL
VIT B12 SERPL-MCNC: 242 PG/ML
WBC # FLD AUTO: 12.72 K/UL

## 2021-09-03 NOTE — HISTORY OF PRESENT ILLNESS
[de-identified] : he patient is a 75 year old white female referred by Dr. Darnell Hassan for evaluation of thrombophilia. \par The patient was hospitalized this past November 2nd for inability to breath following a period of 1-1.5 weeks of difficulty breathing. The patient was diagnosed with multiple pulmonary emboli with apparently a negative duplex-Doppler study. She spent about 4 days in the hospital, was diagnosed with the PE and was started on anticoagulation with heparin drip then Lovenox, eventually discharged on Eliquis. \par She is tolerating the Eliquis well. No bruising or bleeding. The SOB is now 90% better. She may still be having some difficulty climbing steps. \par The patient is denying any weight loss. She was recently diagnosed with autoimmune pancreatitis and has been on steroids since August. She is now being referred to a rheumatologist for further evaluation. No fever or night sweats. Appetite is good. No problems with urine. No new problems with ileostomy. \par She is up to date with her mammograms.\par \par The patient was diagnosed as having being infected with Covid-19 but did not require hospitalization. Her  was affected by the same virus, hospitalized and passed away from to the infection.\par  \par  [de-identified] : 06/28/2021 Patient returns for follow up and for second opinion for VTE diagnosed in October 2020 . History as outlined by Dr Jerome . In summary , she has history of ulcerative colitis s/p total colectomy and ileostomy 20 years ago , currently inactive , chronic autoimmune pancreatitis , on steroids for few years with frequent flare ups . In October 2020 ( 6 months after she contracted Covid 19 )  she developed an episode of bilateral PEs with right heart strain and  negative venous doppler . She has been on eliquis without recurrence , recent V/Q scan , PFTs and venous doppler are all normal . She suffers from severe left knee arthritis and is planning TKA soon . Of note she gained some weight due to decreased activity . She is currently on prednisone 15 mg and has been on 30 mg during acute flare up of pancreatitis. recent rheumatologic evaluation was unrevealing . \par \par 09/02/2021 Patient returns for abnormal blood work , elevated LDH . since her last visit she was diagnosed with right nephro ureterolithiasis , she has mild hypercalcemia , low vitamin D level , elevated wbc and glucose from steroids ( for auto-immune pancreatitis )

## 2021-09-03 NOTE — ASSESSMENT
[FreeTextEntry1] : 76 year old female with autoimmune pancreatitis , ulcerative colitis , inactive on long term steroid therapy ,\par Unprovoked PE with right heart strain in the background  of Covid 19 infection ( six months earlier )and with positive convalescent antibodies . No prior trauma or travel . \par agree with indefinite anticoagulation , \par elevated LDH , \par nephrolithiasis , mild hypercalcemia . \par Plan : repeat LDH , retic , hapto , PTH , vitamin D level \par          for knee arthroplasty in October .

## 2021-09-09 LAB — METHYLMALONATE SERPL-SCNC: 148 NMOL/L

## 2021-09-10 LAB
ALBUMIN MFR SERPL ELPH: 56.2 %
ALBUMIN SERPL-MCNC: 3.9 G/DL
ALBUMIN/GLOB SERPL: 1.3 RATIO
ALPHA1 GLOB MFR SERPL ELPH: 5 %
ALPHA1 GLOB SERPL ELPH-MCNC: 0.4 G/DL
ALPHA2 GLOB MFR SERPL ELPH: 13 %
ALPHA2 GLOB SERPL ELPH-MCNC: 0.9 G/DL
B-GLOBULIN MFR SERPL ELPH: 14.7 %
B-GLOBULIN SERPL ELPH-MCNC: 1 G/DL
GAMMA GLOB FLD ELPH-MCNC: 0.8 G/DL
GAMMA GLOB MFR SERPL ELPH: 11.1 %
INTERPRETATION SERPL IEP-IMP: NORMAL
PROT SERPL-MCNC: 7 G/DL
PROT SERPL-MCNC: 7 G/DL

## 2021-09-14 ENCOUNTER — APPOINTMENT (OUTPATIENT)
Dept: UROLOGY | Facility: CLINIC | Age: 76
End: 2021-09-14
Payer: MEDICARE

## 2021-09-14 PROCEDURE — 99214 OFFICE O/P EST MOD 30 MIN: CPT

## 2021-09-14 NOTE — REVIEW OF SYSTEMS
[Feeling Poorly] : not feeling poorly [Sore Throat] : no sore throat [Chest Pain] : no chest pain [Cough] : no cough [Constipation] : no constipation [Dysuria] : no dysuria

## 2021-09-14 NOTE — HISTORY OF PRESENT ILLNESS
[FreeTextEntry1] : Story of recent passage of kidney stone.  She underwent a metabolic stone work-up which shows normal parathyroid hormone, normal calcium levels in the blood.  24-hour urine shows low volume and low citrate.  She feels well

## 2021-09-14 NOTE — ASSESSMENT
[FreeTextEntry1] : Patient to increase water intake to 8-12 8 ounce glasses of water and supplement that with lemon juice.

## 2021-09-16 ENCOUNTER — APPOINTMENT (OUTPATIENT)
Age: 76
End: 2021-09-16

## 2021-09-21 ENCOUNTER — OUTPATIENT (OUTPATIENT)
Dept: OUTPATIENT SERVICES | Facility: HOSPITAL | Age: 76
LOS: 1 days | Discharge: HOME | End: 2021-09-21
Payer: MEDICARE

## 2021-09-21 ENCOUNTER — RESULT REVIEW (OUTPATIENT)
Age: 76
End: 2021-09-21

## 2021-09-21 VITALS
HEIGHT: 62 IN | WEIGHT: 190.04 LBS | OXYGEN SATURATION: 98 % | DIASTOLIC BLOOD PRESSURE: 79 MMHG | TEMPERATURE: 97 F | RESPIRATION RATE: 17 BRPM | HEART RATE: 71 BPM | SYSTOLIC BLOOD PRESSURE: 174 MMHG

## 2021-09-21 DIAGNOSIS — Z90.49 ACQUIRED ABSENCE OF OTHER SPECIFIED PARTS OF DIGESTIVE TRACT: Chronic | ICD-10-CM

## 2021-09-21 DIAGNOSIS — M17.12 UNILATERAL PRIMARY OSTEOARTHRITIS, LEFT KNEE: ICD-10-CM

## 2021-09-21 DIAGNOSIS — I26.99 OTHER PULMONARY EMBOLISM WITHOUT ACUTE COR PULMONALE: ICD-10-CM

## 2021-09-21 DIAGNOSIS — Z93.2 ILEOSTOMY STATUS: Chronic | ICD-10-CM

## 2021-09-21 DIAGNOSIS — Z01.818 ENCOUNTER FOR OTHER PREPROCEDURAL EXAMINATION: ICD-10-CM

## 2021-09-21 DIAGNOSIS — Z90.710 ACQUIRED ABSENCE OF BOTH CERVIX AND UTERUS: Chronic | ICD-10-CM

## 2021-09-21 LAB
A1C WITH ESTIMATED AVERAGE GLUCOSE RESULT: 6.8 % — HIGH (ref 4–5.6)
ALBUMIN SERPL ELPH-MCNC: 4.2 G/DL — SIGNIFICANT CHANGE UP (ref 3.5–5.2)
ALP SERPL-CCNC: 67 U/L — SIGNIFICANT CHANGE UP (ref 30–115)
ALT FLD-CCNC: 24 U/L — SIGNIFICANT CHANGE UP (ref 0–41)
ANION GAP SERPL CALC-SCNC: 13 MMOL/L — SIGNIFICANT CHANGE UP (ref 7–14)
APTT BLD: 28.4 SEC — SIGNIFICANT CHANGE UP (ref 27–39.2)
AST SERPL-CCNC: 14 U/L — SIGNIFICANT CHANGE UP (ref 0–41)
BASOPHILS # BLD AUTO: 0.07 K/UL — SIGNIFICANT CHANGE UP (ref 0–0.2)
BASOPHILS NFR BLD AUTO: 0.6 % — SIGNIFICANT CHANGE UP (ref 0–1)
BILIRUB SERPL-MCNC: 0.3 MG/DL — SIGNIFICANT CHANGE UP (ref 0.2–1.2)
BLD GP AB SCN SERPL QL: SIGNIFICANT CHANGE UP
BUN SERPL-MCNC: 16 MG/DL — SIGNIFICANT CHANGE UP (ref 10–20)
CALCIUM SERPL-MCNC: 10 MG/DL — SIGNIFICANT CHANGE UP (ref 8.5–10.1)
CHLORIDE SERPL-SCNC: 105 MMOL/L — SIGNIFICANT CHANGE UP (ref 98–110)
CO2 SERPL-SCNC: 26 MMOL/L — SIGNIFICANT CHANGE UP (ref 17–32)
CREAT SERPL-MCNC: 0.9 MG/DL — SIGNIFICANT CHANGE UP (ref 0.7–1.5)
EOSINOPHIL # BLD AUTO: 0.19 K/UL — SIGNIFICANT CHANGE UP (ref 0–0.7)
EOSINOPHIL NFR BLD AUTO: 1.5 % — SIGNIFICANT CHANGE UP (ref 0–8)
ESTIMATED AVERAGE GLUCOSE: 148 MG/DL — HIGH (ref 68–114)
GLUCOSE SERPL-MCNC: 84 MG/DL — SIGNIFICANT CHANGE UP (ref 70–99)
HCT VFR BLD CALC: 40 % — SIGNIFICANT CHANGE UP (ref 37–47)
HGB BLD-MCNC: 12 G/DL — SIGNIFICANT CHANGE UP (ref 12–16)
IMM GRANULOCYTES NFR BLD AUTO: 1.4 % — HIGH (ref 0.1–0.3)
INR BLD: 1.16 RATIO — SIGNIFICANT CHANGE UP (ref 0.65–1.3)
LYMPHOCYTES # BLD AUTO: 2.52 K/UL — SIGNIFICANT CHANGE UP (ref 1.2–3.4)
LYMPHOCYTES # BLD AUTO: 20.1 % — LOW (ref 20.5–51.1)
MCHC RBC-ENTMCNC: 25.9 PG — LOW (ref 27–31)
MCHC RBC-ENTMCNC: 30 G/DL — LOW (ref 32–37)
MCV RBC AUTO: 86.4 FL — SIGNIFICANT CHANGE UP (ref 81–99)
MONOCYTES # BLD AUTO: 0.8 K/UL — HIGH (ref 0.1–0.6)
MONOCYTES NFR BLD AUTO: 6.4 % — SIGNIFICANT CHANGE UP (ref 1.7–9.3)
MRSA PCR RESULT.: NEGATIVE — SIGNIFICANT CHANGE UP
NEUTROPHILS # BLD AUTO: 8.8 K/UL — HIGH (ref 1.4–6.5)
NEUTROPHILS NFR BLD AUTO: 70 % — SIGNIFICANT CHANGE UP (ref 42.2–75.2)
NRBC # BLD: 0 /100 WBCS — SIGNIFICANT CHANGE UP (ref 0–0)
NT-PROBNP SERPL-SCNC: 114 PG/ML — SIGNIFICANT CHANGE UP (ref 0–300)
PLATELET # BLD AUTO: 376 K/UL — SIGNIFICANT CHANGE UP (ref 130–400)
POTASSIUM SERPL-MCNC: 4.1 MMOL/L — SIGNIFICANT CHANGE UP (ref 3.5–5)
POTASSIUM SERPL-SCNC: 4.1 MMOL/L — SIGNIFICANT CHANGE UP (ref 3.5–5)
PROT SERPL-MCNC: 6.9 G/DL — SIGNIFICANT CHANGE UP (ref 6–8)
PROTHROM AB SERPL-ACNC: 13.3 SEC — HIGH (ref 9.95–12.87)
RBC # BLD: 4.63 M/UL — SIGNIFICANT CHANGE UP (ref 4.2–5.4)
RBC # FLD: 15 % — HIGH (ref 11.5–14.5)
SODIUM SERPL-SCNC: 144 MMOL/L — SIGNIFICANT CHANGE UP (ref 135–146)
WBC # BLD: 12.55 K/UL — HIGH (ref 4.8–10.8)
WBC # FLD AUTO: 12.55 K/UL — HIGH (ref 4.8–10.8)

## 2021-09-21 PROCEDURE — 73562 X-RAY EXAM OF KNEE 3: CPT | Mod: 26,LT

## 2021-09-21 PROCEDURE — 71046 X-RAY EXAM CHEST 2 VIEWS: CPT | Mod: 26

## 2021-09-21 PROCEDURE — 93010 ELECTROCARDIOGRAM REPORT: CPT

## 2021-09-21 PROCEDURE — 72170 X-RAY EXAM OF PELVIS: CPT | Mod: 26

## 2021-09-21 RX ORDER — LIPASE/PROTEASE/AMYLASE 16-48-48K
0 CAPSULE,DELAYED RELEASE (ENTERIC COATED) ORAL
Qty: 0 | Refills: 0 | DISCHARGE

## 2021-09-21 RX ORDER — BISOPROLOL FUMARATE 10 MG/1
1 TABLET, FILM COATED ORAL
Qty: 0 | Refills: 0 | DISCHARGE

## 2021-09-21 NOTE — H&P PST ADULT - REASON FOR ADMISSION
75 Y/O FEMALE HERE FOR PRE-ADMISSION SURGICAL TESTING. PATIENT REPORTS LEFT KNEE PAIN X1 YR. SHE WAS DIAGNOSED WITH OA. NOW IT'S GETTING HARDER TO WALK.  NOW FOR SCHEDULED PROCEDURE.

## 2021-09-21 NOTE — H&P PST ADULT - HISTORY OF PRESENT ILLNESS
PATIENT DENIES CHEST PAIN, SHORTNESS OF BREATH, PALPITATIONS, COUGHING, FEVER, DYSURIA.  CAN WALK UP 1/2 FLIGHTS OF STEPS WITHOUT SOB, AND REPORTS FERRARI.    NO COUGH, FEVER, SORE THROAT, HEADACHE, LOSS OF TASTE OR SMELL. NO KNOWN EXPOSURE TO ANYONE WITH COVID. PATIENT WAS INSTRUCTED TO ISOLATE FROM NOW UNTIL THE SURGERY.    Anesthesia Alert  + -Difficult Airway (CLASS IV)  NO--History of neck surgery or radiation  NO--Limited ROM of neck  NO--History of Malignant hyperthermia  NO--Personal or family history of Pseudocholinesterase deficiency  NO--Prior Anesthesia Complication  NO--Latex Allergy  NO--Loose teeth  NO--History of Rheumatoid Arthritis  NO--MEENA (+SNORRING)  + BLEEDING RISK ON ELIQUIS           PATIENT DENIES CHEST PAIN, SHORTNESS OF BREATH, PALPITATIONS, COUGHING, FEVER, DYSURIA.  CAN WALK UP 1/2 FLIGHTS OF STEPS WITHOUT SOB, AND REPORTS FERRARI.    NO COUGH, FEVER, SORE THROAT, HEADACHE, LOSS OF TASTE OR SMELL. NO KNOWN EXPOSURE TO ANYONE WITH COVID. PATIENT WAS INSTRUCTED TO ISOLATE FROM NOW UNTIL THE SURGERY.    Anesthesia Alert  + -Difficult Airway (CLASS IV)  NO--History of neck surgery or radiation  NO--Limited ROM of neck  NO--History of Malignant hyperthermia  NO--Personal or family history of Pseudocholinesterase deficiency  NO--Prior Anesthesia Complication  NO--Latex Allergy  NO--Loose teeth  NO--History of Rheumatoid Arthritis  NO--MEENA (+SNORRING)  + BLEEDING RISK ON ELIQUIS    WAS HOSPITALIZED IN NOVEMBER. HAD AN ELEVATED BNP.

## 2021-09-21 NOTE — H&P PST ADULT - NSICDXPASTSURGICALHX_GEN_ALL_CORE_FT
PAST SURGICAL HISTORY:  H/O total hysterectomy     History of laparoscopic cholecystectomy     S/P ileostomy     S/P total colectomy

## 2021-09-21 NOTE — H&P PST ADULT - NSANTHOSAYNRD_GEN_A_CORE
No. MEENA screening performed.  STOP BANG Legend: 0-2 = LOW Risk; 3-4 = INTERMEDIATE Risk; 5-8 = HIGH Risk

## 2021-09-21 NOTE — H&P PST ADULT - NSICDXPASTMEDICALHX_GEN_ALL_CORE_FT
PAST MEDICAL HISTORY:  2019 novel coronavirus disease (COVID-19) 3/2020    Anxiety     Autoimmune pancreatitis DAILY PREDNISONE    Chronic GERD     H/O ulcerative colitis     HTN (hypertension)     Pulmonary embolism      PAST MEDICAL HISTORY:  2019 novel coronavirus disease (COVID-19) 3/2020    Anxiety     Autoimmune pancreatitis DAILY PREDNISONE    Chronic GERD     H/O ulcerative colitis     HTN (hypertension)     Pulmonary embolism     Thrombocytopenia

## 2021-09-25 ENCOUNTER — APPOINTMENT (OUTPATIENT)
Age: 76
End: 2021-09-25
Payer: MEDICARE

## 2021-09-25 VITALS
BODY MASS INDEX: 35.33 KG/M2 | WEIGHT: 192 LBS | HEART RATE: 90 BPM | OXYGEN SATURATION: 97 % | DIASTOLIC BLOOD PRESSURE: 74 MMHG | HEIGHT: 62 IN | SYSTOLIC BLOOD PRESSURE: 128 MMHG | RESPIRATION RATE: 14 BRPM

## 2021-09-25 PROBLEM — D69.6 THROMBOCYTOPENIA, UNSPECIFIED: Chronic | Status: ACTIVE | Noted: 2021-09-21

## 2021-09-25 PROBLEM — F41.9 ANXIETY DISORDER, UNSPECIFIED: Chronic | Status: ACTIVE | Noted: 2021-09-21

## 2021-09-25 PROBLEM — U07.1 COVID-19: Chronic | Status: ACTIVE | Noted: 2021-09-21

## 2021-09-25 PROBLEM — K86.1 OTHER CHRONIC PANCREATITIS: Chronic | Status: ACTIVE | Noted: 2020-11-02

## 2021-09-25 PROBLEM — I26.99 OTHER PULMONARY EMBOLISM WITHOUT ACUTE COR PULMONALE: Chronic | Status: ACTIVE | Noted: 2021-09-21

## 2021-09-25 PROCEDURE — 99214 OFFICE O/P EST MOD 30 MIN: CPT

## 2021-09-25 NOTE — ASSESSMENT
[FreeTextEntry1] : HO VTE \par HO UC \par At the present time from the pulmonary stand point, the patient is stable for her TKR

## 2021-09-25 NOTE — HISTORY OF PRESENT ILLNESS
[Follow-Up - Routine Clinic] : a routine clinic follow-up of [Currently Experiencing] : The patient is currently experiencing symptoms. [Shortness of Breath] : Shortness of Breath [Cough] : no cough [Orthopnea] : no orthopnea

## 2021-10-03 ENCOUNTER — OUTPATIENT (OUTPATIENT)
Dept: OUTPATIENT SERVICES | Facility: HOSPITAL | Age: 76
LOS: 1 days | Discharge: HOME | End: 2021-10-03

## 2021-10-03 DIAGNOSIS — Z90.710 ACQUIRED ABSENCE OF BOTH CERVIX AND UTERUS: Chronic | ICD-10-CM

## 2021-10-03 DIAGNOSIS — Z90.49 ACQUIRED ABSENCE OF OTHER SPECIFIED PARTS OF DIGESTIVE TRACT: Chronic | ICD-10-CM

## 2021-10-03 DIAGNOSIS — Z93.2 ILEOSTOMY STATUS: Chronic | ICD-10-CM

## 2021-10-03 DIAGNOSIS — Z11.59 ENCOUNTER FOR SCREENING FOR OTHER VIRAL DISEASES: ICD-10-CM

## 2021-10-05 ENCOUNTER — FORM ENCOUNTER (OUTPATIENT)
Age: 76
End: 2021-10-05

## 2021-10-06 ENCOUNTER — OUTPATIENT (OUTPATIENT)
Dept: OUTPATIENT SERVICES | Facility: HOSPITAL | Age: 76
LOS: 1 days | Discharge: HOME | End: 2021-10-06

## 2021-10-06 ENCOUNTER — INPATIENT (INPATIENT)
Facility: HOSPITAL | Age: 76
LOS: 0 days | Discharge: HOME HEALTH | End: 2021-10-07
Attending: ORTHOPAEDIC SURGERY | Admitting: ORTHOPAEDIC SURGERY
Payer: MEDICARE

## 2021-10-06 ENCOUNTER — RESULT REVIEW (OUTPATIENT)
Age: 76
End: 2021-10-06

## 2021-10-06 VITALS
SYSTOLIC BLOOD PRESSURE: 165 MMHG | HEART RATE: 81 BPM | WEIGHT: 184.97 LBS | HEIGHT: 62 IN | OXYGEN SATURATION: 99 % | RESPIRATION RATE: 17 BRPM | DIASTOLIC BLOOD PRESSURE: 70 MMHG | TEMPERATURE: 96 F

## 2021-10-06 DIAGNOSIS — M17.12 UNILATERAL PRIMARY OSTEOARTHRITIS, LEFT KNEE: ICD-10-CM

## 2021-10-06 DIAGNOSIS — Z90.49 ACQUIRED ABSENCE OF OTHER SPECIFIED PARTS OF DIGESTIVE TRACT: Chronic | ICD-10-CM

## 2021-10-06 DIAGNOSIS — Z88.2 ALLERGY STATUS TO SULFONAMIDES: ICD-10-CM

## 2021-10-06 DIAGNOSIS — Z90.710 ACQUIRED ABSENCE OF BOTH CERVIX AND UTERUS: Chronic | ICD-10-CM

## 2021-10-06 DIAGNOSIS — Z93.2 ILEOSTOMY STATUS: Chronic | ICD-10-CM

## 2021-10-06 PROCEDURE — 88304 TISSUE EXAM BY PATHOLOGIST: CPT | Mod: 26

## 2021-10-06 PROCEDURE — 73560 X-RAY EXAM OF KNEE 1 OR 2: CPT | Mod: 26,LT

## 2021-10-06 PROCEDURE — 88311 DECALCIFY TISSUE: CPT | Mod: 26

## 2021-10-06 RX ORDER — ACETAMINOPHEN 500 MG
1000 TABLET ORAL ONCE
Refills: 0 | Status: COMPLETED | OUTPATIENT
Start: 2021-10-06 | End: 2021-10-06

## 2021-10-06 RX ORDER — ASPIRIN/CALCIUM CARB/MAGNESIUM 324 MG
81 TABLET ORAL EVERY 12 HOURS
Refills: 0 | Status: DISCONTINUED | OUTPATIENT
Start: 2021-10-06 | End: 2021-10-06

## 2021-10-06 RX ORDER — ONDANSETRON 8 MG/1
4 TABLET, FILM COATED ORAL ONCE
Refills: 0 | Status: DISCONTINUED | OUTPATIENT
Start: 2021-10-06 | End: 2021-10-06

## 2021-10-06 RX ORDER — ALPRAZOLAM 0.25 MG
0.25 TABLET ORAL DAILY
Refills: 0 | Status: DISCONTINUED | OUTPATIENT
Start: 2021-10-06 | End: 2021-10-06

## 2021-10-06 RX ORDER — CHLORHEXIDINE GLUCONATE 213 G/1000ML
1 SOLUTION TOPICAL DAILY
Refills: 0 | Status: DISCONTINUED | OUTPATIENT
Start: 2021-10-06 | End: 2021-10-07

## 2021-10-06 RX ORDER — VANCOMYCIN HCL 1 G
1250 VIAL (EA) INTRAVENOUS ONCE
Refills: 0 | Status: COMPLETED | OUTPATIENT
Start: 2021-10-07 | End: 2021-10-07

## 2021-10-06 RX ORDER — FENOFIBRATE,MICRONIZED 130 MG
145 CAPSULE ORAL DAILY
Refills: 0 | Status: DISCONTINUED | OUTPATIENT
Start: 2021-10-06 | End: 2021-10-07

## 2021-10-06 RX ORDER — KETOROLAC TROMETHAMINE 30 MG/ML
15 SYRINGE (ML) INJECTION EVERY 6 HOURS
Refills: 0 | Status: DISCONTINUED | OUTPATIENT
Start: 2021-10-06 | End: 2021-10-07

## 2021-10-06 RX ORDER — SENNA PLUS 8.6 MG/1
2 TABLET ORAL AT BEDTIME
Refills: 0 | Status: DISCONTINUED | OUTPATIENT
Start: 2021-10-06 | End: 2021-10-07

## 2021-10-06 RX ORDER — HYDROMORPHONE HYDROCHLORIDE 2 MG/ML
1 INJECTION INTRAMUSCULAR; INTRAVENOUS; SUBCUTANEOUS
Refills: 0 | Status: DISCONTINUED | OUTPATIENT
Start: 2021-10-06 | End: 2021-10-06

## 2021-10-06 RX ORDER — LIPASE/PROTEASE/AMYLASE 16-48-48K
2 CAPSULE,DELAYED RELEASE (ENTERIC COATED) ORAL
Refills: 0 | Status: DISCONTINUED | OUTPATIENT
Start: 2021-10-06 | End: 2021-10-06

## 2021-10-06 RX ORDER — AMLODIPINE BESYLATE 2.5 MG/1
5 TABLET ORAL
Refills: 0 | Status: DISCONTINUED | OUTPATIENT
Start: 2021-10-06 | End: 2021-10-06

## 2021-10-06 RX ORDER — MEPERIDINE HYDROCHLORIDE 50 MG/ML
12.5 INJECTION INTRAMUSCULAR; INTRAVENOUS; SUBCUTANEOUS
Refills: 0 | Status: DISCONTINUED | OUTPATIENT
Start: 2021-10-06 | End: 2021-10-06

## 2021-10-06 RX ORDER — ASPIRIN/CALCIUM CARB/MAGNESIUM 324 MG
81 TABLET ORAL
Refills: 0 | Status: DISCONTINUED | OUTPATIENT
Start: 2021-10-06 | End: 2021-10-06

## 2021-10-06 RX ORDER — ONDANSETRON 8 MG/1
4 TABLET, FILM COATED ORAL EVERY 6 HOURS
Refills: 0 | Status: DISCONTINUED | OUTPATIENT
Start: 2021-10-06 | End: 2021-10-07

## 2021-10-06 RX ORDER — VANCOMYCIN HCL 1 G
1250 VIAL (EA) INTRAVENOUS ONCE
Refills: 0 | Status: DISCONTINUED | OUTPATIENT
Start: 2021-10-06 | End: 2021-10-07

## 2021-10-06 RX ORDER — TRAMADOL HYDROCHLORIDE 50 MG/1
50 TABLET ORAL EVERY 4 HOURS
Refills: 0 | Status: DISCONTINUED | OUTPATIENT
Start: 2021-10-06 | End: 2021-10-07

## 2021-10-06 RX ORDER — MAGNESIUM HYDROXIDE 400 MG/1
30 TABLET, CHEWABLE ORAL DAILY
Refills: 0 | Status: DISCONTINUED | OUTPATIENT
Start: 2021-10-06 | End: 2021-10-07

## 2021-10-06 RX ORDER — ACETAMINOPHEN 500 MG
650 TABLET ORAL EVERY 6 HOURS
Refills: 0 | Status: DISCONTINUED | OUTPATIENT
Start: 2021-10-06 | End: 2021-10-07

## 2021-10-06 RX ORDER — POLYETHYLENE GLYCOL 3350 17 G/17G
17 POWDER, FOR SOLUTION ORAL AT BEDTIME
Refills: 0 | Status: DISCONTINUED | OUTPATIENT
Start: 2021-10-06 | End: 2021-10-07

## 2021-10-06 RX ORDER — DEXAMETHASONE 0.5 MG/5ML
2 ELIXIR ORAL ONCE
Refills: 0 | Status: DISCONTINUED | OUTPATIENT
Start: 2021-10-07 | End: 2021-10-07

## 2021-10-06 RX ORDER — ALPRAZOLAM 0.25 MG
0.25 TABLET ORAL DAILY
Refills: 0 | Status: DISCONTINUED | OUTPATIENT
Start: 2021-10-06 | End: 2021-10-07

## 2021-10-06 RX ORDER — LISINOPRIL 2.5 MG/1
20 TABLET ORAL EVERY 12 HOURS
Refills: 0 | Status: DISCONTINUED | OUTPATIENT
Start: 2021-10-06 | End: 2021-10-06

## 2021-10-06 RX ORDER — AMLODIPINE BESYLATE 2.5 MG/1
5 TABLET ORAL
Refills: 0 | Status: DISCONTINUED | OUTPATIENT
Start: 2021-10-06 | End: 2021-10-07

## 2021-10-06 RX ORDER — PANTOPRAZOLE SODIUM 20 MG/1
40 TABLET, DELAYED RELEASE ORAL
Refills: 0 | Status: DISCONTINUED | OUTPATIENT
Start: 2021-10-06 | End: 2021-10-07

## 2021-10-06 RX ORDER — LIPASE/PROTEASE/AMYLASE 16-48-48K
2 CAPSULE,DELAYED RELEASE (ENTERIC COATED) ORAL
Refills: 0 | Status: DISCONTINUED | OUTPATIENT
Start: 2021-10-06 | End: 2021-10-07

## 2021-10-06 RX ORDER — SODIUM CHLORIDE 9 MG/ML
1000 INJECTION INTRAMUSCULAR; INTRAVENOUS; SUBCUTANEOUS
Refills: 0 | Status: DISCONTINUED | OUTPATIENT
Start: 2021-10-06 | End: 2021-10-07

## 2021-10-06 RX ORDER — LISINOPRIL 2.5 MG/1
20 TABLET ORAL EVERY 12 HOURS
Refills: 0 | Status: DISCONTINUED | OUTPATIENT
Start: 2021-10-06 | End: 2021-10-07

## 2021-10-06 RX ORDER — APIXABAN 2.5 MG/1
2.5 TABLET, FILM COATED ORAL ONCE
Refills: 0 | Status: COMPLETED | OUTPATIENT
Start: 2021-10-07 | End: 2021-10-07

## 2021-10-06 RX ORDER — FENOFIBRATE,MICRONIZED 130 MG
145 CAPSULE ORAL DAILY
Refills: 0 | Status: DISCONTINUED | OUTPATIENT
Start: 2021-10-06 | End: 2021-10-06

## 2021-10-06 RX ORDER — HYDROMORPHONE HYDROCHLORIDE 2 MG/ML
0.5 INJECTION INTRAMUSCULAR; INTRAVENOUS; SUBCUTANEOUS
Refills: 0 | Status: DISCONTINUED | OUTPATIENT
Start: 2021-10-06 | End: 2021-10-06

## 2021-10-06 RX ORDER — SODIUM CHLORIDE 9 MG/ML
1000 INJECTION, SOLUTION INTRAVENOUS
Refills: 0 | Status: DISCONTINUED | OUTPATIENT
Start: 2021-10-06 | End: 2021-10-06

## 2021-10-06 RX ADMIN — SODIUM CHLORIDE 75 MILLILITER(S): 9 INJECTION INTRAMUSCULAR; INTRAVENOUS; SUBCUTANEOUS at 22:08

## 2021-10-06 RX ADMIN — Medication 1000 MILLIGRAM(S): at 11:53

## 2021-10-06 RX ADMIN — Medication 1000 MILLIGRAM(S): at 11:52

## 2021-10-06 RX ADMIN — Medication 650 MILLIGRAM(S): at 17:25

## 2021-10-06 RX ADMIN — LISINOPRIL 20 MILLIGRAM(S): 2.5 TABLET ORAL at 17:32

## 2021-10-06 RX ADMIN — TRAMADOL HYDROCHLORIDE 50 MILLIGRAM(S): 50 TABLET ORAL at 19:11

## 2021-10-06 RX ADMIN — Medication 15 MILLIGRAM(S): at 17:24

## 2021-10-06 NOTE — CHART NOTE - NSCHARTNOTEFT_GEN_A_CORE
Patient refused to take Aspirin as patient's Pulmonologist recommended against taking Aspirin   Patient understands the risks   Will discontinue the aspirin   Patient to start Eliquis tomorrow

## 2021-10-06 NOTE — ASU PREOP CHECKLIST - BP NONINVASIVE DIASTOLIC (MM HG)
"Outpatient Psychiatry Initial Visit  09/13/2019    ID:   38 year old female presenting for an initial evaluation. Met with patient.    Reason for encounter: Referral from Dr. Lai. Patient complains of ADD.    History of Present Illness: Pt. is a 38 year old female with a past psychiatric hx of ADD who is presenting to the clinic for an initial evaluation and treatment. Patient also has a past medical history of morbid obesity. She presents to me not taking any psychiatric meds but does report on previous trial of Adderall - d/c this upon becoming pregnant about 4 years ago. Now going back to work and feels she would like to re-start her ADD medications.     Pt speaks to a long hx of inattention and distractibility that began in childhood and prior to age 14. "I was never able to sit still in class and was always thinking about everything except what they were saying. I've always been late to everything and forgetful." States that she has struggled with poor focus, and inability to stay task-oriented. Has been a procrastinator in the past. This has affected her at home in completing chores and normal household duties. States that she is "totally unable to sit still." She was eventually diagnosed with ADD about 7 years ago by her PCP, and started on Adderall with good efficacy and tolerability. "It changed my life. I got so many things accomplished. I could finally complete everything that's on my list."    Has a 1 and 3 year old. Her 1 year old has been diagnosed with spina bifida and hydrocephalus, requiring lots of at-home care. Her  travels frequently with the , and "we unexpectedly had a special needs child, and I have felt totally overwhelmed and like I'm carrying the weight of the world." States that her child had 6 major surgeries before she was 6 months old - has been totally overwhelming for her. Her  was deployed during her pregnancy and when they discovered the child's " "diagnosis.    Denies any hx of depression. Denies anhedonia, denies feelings of of hopelessness and worthlessness. Expresses some guilt that "what happened to by daughter may be kind of my fault". Dec'd sleep quality, gets around 4-5 hours of sleep but attributes this to "teething kids." Endorses fatigue, "I feel like I'm always tired" and endorses poor concentration. Appetite good. Denies PMA. Denies SI/intent plan.    Attributes most of her anxiety to situational stressors r/t to raising her daughter. Denies generalized or excessive worrying outside of daily tasks/responsibilities. Dec'd energy and cocnentration. Denies feeling restless or tense. Does endorses some irritability.     Pt currently endorses or denies the following symptoms:  Psych ROS:  Depression: denies s/sx of MDD  Anxiety: no panic attacks, no agoraphobia, no social anxiety  PTSD: no flashbacks, nightmares, or avoidance of stimuli  Kendra/Psychosis: No manic episodes, no A/V hallucinations  SI - No SI - access to guns? yes    Past Psychiatric History:  Past Psych Hx: First psych contact: Dr. Tello   Prior hospitalizations: denies Prior suicide attempts or self harm: denies  Prior diagnosis: ADD  Prior meds: Adderall  Current meds: none  Prior psychotherapy: grief counseling      Past Medical Hx: Hx of TBI? denies      Hx of seizures? denies  Past Medical History:   Diagnosis Date    ADD (attention deficit disorder)     Morbid obesity 2017    Obesity     Plantar fasciitis 2012         Past Surgical Hx:  Past Surgical History:   Procedure Laterality Date    addenoids      ARTHROSCOPY, ANKLE Right 2018    [facility]     SECTION  02/04/2016    x2    FIXATION, SYNDESMOSIS, ANKLE Right 2018    [facility]    ORIF, FRACTURE, FIBULA Right 2018    [facility]    TONSILLECTOMY, ADENOIDECTOMY   " "      Family Hx:   Paternal: Father [place] vet and dx with "bipolar" and treated, abused alcohol. Denies suicide  Maternal: Denies hx of mental illness, denies substance abuse. Denies suicide      Social Hx:   Childhood: B/R in [place], by both parents. Only child, no abuse/trauma. Good childhood  Marital Status:  x 3.5 years to Daniel  Children: 2 children, age 3 and 1. 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 denies    Review of Symptoms  GENERAL: "gained 100 pounds over the last couple years"  SKIN: no rashes or lacerations  HEAD: no headahces  EYES: no jaundice, blindness. No exophthalmos  EARS: no dizziness, tinnitus, or hearing loss  NOSE: no changes in smell  Mouth/throat: no dyskinetic movements or obvious goiter  CHEST: no SOB, hyperventilation or cough  CARDIO: no tachycardia, bradycardia, or chest pain  ABDOMEN: no nausea, vomiting, pain, constipation, or diarrhea  URINARY:  no frequency, dysuria, or sexual dysfunction  ENDOCRINE: No polydipsia, polyuria, no cold/hot intolerance  MUSCULOSKELETAL: no joint pain/stiffness  NEUROLOGIC: no weakness or sensory changes, no seizures, no confusion, memory loss, or forgetfulness, no tremor or abnormal movements    Current Evaluation:  Nutritional Screening:  Considering the patient's height and weight, medications, medical history and preferences, should a referral be made to the dietitian? No  Vitals: most recent vitals signs, dated greater than 90 days prior to this appointment, were reviewed  General: age appropriate, well nourished, casually dressed, " "neatly groomed  MSK: muscle strength/tone : no tremor or abnormal movements. Gait/Station: no ataxic, steady    Suicide Risk Assessment:  Protective factors: age, gender, no prior attempts, no prior hospitalizations, no ongoing substance abuse, no psychosis, , has children, denies SI/intent/plan, seeking treatment, access to treatment, future oriented, good primary support, no access to firearms? Yes, secured in gun safe    Risks: access to firearms    Patient is a low immediate and long-term risk considering risk factors    Psychiatric:  Speech: Normal rate, rhythm, volume. No latency, no pressured speech  Mood/Affect: euthymic, congruent and appropriate   Though Process: organized, logical, linear  Thought Content: no suicidal or homicidal ideation, no A/V hallucinations, delusions or paranoia  Insight: Intact; aware of illness  Judgement: behavior is adequate to circumstances  Orientation: A&O x 4,  Memory: Intact for content of interview, 3/3 immediate, 3/3 after 3 mins. Able to recall recent and remote events.  Language: Grossly intact, no aphasias   Concentration: Spells "world" correctly forward & backwards  Knowledge/Intelligence: appropriate to age and level of education.   Spouse/Partner: Supportive    ASSESSMENT - DIAGNOSIS - GOALS:  Impression:Pt. is a 38 year old female with a past psychiatric hx of ADD who is presenting to the clinic for an initial evaluation and treatment. Patient also has a past medical history of morbid obesity. She presents to me not taking any psychiatric meds but does report on previous trial of Adderall - d/c this upon becoming pregnant about 4 years ago. Now going back to work and feels she would like to re-start her ADD medications.     Pt speaks to a long hx of inattention and distractibility that began in childhood and prior to age 14. "I was never able to sit still in class and was always thinking about everything except what they were saying. I've always been late " "to everything and forgetful." States that she has struggled with poor focus, and inability to stay task-oriented. Has been a procrastinator in the past. This has affected her at home in completing chores and normal household duties. States that she is "totally unable to sit still." She was eventually diagnosed with ADD about 7 years ago by her PCP, and started on Adderall with good efficacy and tolerability. "It changed my life. I got so many things accomplished. I could finally complete everything that's on my list."    Meets criteria for ADHD         Safe for outpatient tx and no acute safety concerns.  Diagnosis/Diagnoses: ADHD    Strengths/Liabilities: Patient accepts feedback & guidance. Patient is motivated for change.     Treatment Goals: Specify outcomes written in observable, behavioral terms      Treatment Plan/Recommendations:   Medication Management: The risks and benefits of medication were discussed with the patient.   Meds:    1) Start Adderall XR 10 mg QD. Discussed risks of abuse potential, insomnia, anxiety, elevated BP, HR, arrthymias, MI, stroke, sudden death     2) Start Hydroxyzine 25 mg QD PRN for anxiety.       Labs: no new orders  Return to Clinic: 4 weeks  Counseling time: 35 mins  Total time: 60 mins    -  Patient given contact # for psychotherapists at [facility] and also instructed they may check with insurance for a list of providers.   - Call to report any worsening of symptoms or problems associated with medication  - Pt instructed to go to ER if thoughts of harming self or others arise     -Spent 60min face to face with the pt; >50% time spent in counseling   -Supportive therapy and psychoeducation provided  -R/B/SE's of medications discussed with the pt who expresses understanding and chooses to take medications as prescribed.   -Pt instructed to call clinic, 911 or go to nearest emergency room if sxs worsen or pt is in   crisis. The pt expresses understanding.    Vito " J Fercho, NP     72

## 2021-10-06 NOTE — ASU PATIENT PROFILE, ADULT - NS PRO MODE OF ARRIVAL
Operative Note    Patient Name:  Riky Moon  YOB: 1937  Medical Records Number:  [id number]    Date of Procedure:  11/9/2017    Pre-operative Diagnosis:  Primary Osteoarthritis Right Hip    Post-operative Diagnosis:  Primary Osteoarthritis Right Hip    Procedure Performed:  Right Total Hip Arthroplasty                                          Layered Closure    Implants:  Biomet 54 mm Trispike Acetabular Shell, 14 mm High Offset Taperloc Complete Stem, 40 mm +3 Lateralized, High-wall Polyethylene Liner, std. 40 mm Metal Head    Surgeon:  Riky Fernando M.D.    Assistant: Young Hou (who was present during the critical portions of the case, thereby decreasing operative time and patient morbidity)    Anesthetic Type:  General    Estimated Blood Loss:  250cc's    Specimens: * No orders in the log *    No Complications      Indications for Procedure:  Riky Moon is a 80 y.o. male suffering from end stage degenerative changes in the right hip.  The patients pain is becoming disabling, despite extensive conservative care, including NSAIDS, activity modification and therapy.  The risks, benefits and alternatives were discussed and the patient wishes to proceed with total hip arthroplasty.      Procedure Performed:    After informed consent was obtained and pre-operative IV antibiotics given, the patient was taken to the operating room and placed supine on the operating table.  After general anesthesia induced and 1 gm of IV Tranxemic Acid given, the patient was placed in the left lateral decubitus position and the right lower extremity was prepped with chloraprep and draped in a sterile fashion.    An incision was made overlying the right hip.  We sharply dissected down to expose the fascia over the gluteus francisco and the Iliotibial band.  We split the fascia in line with the skin incision and placed a Charnley retractor carefully to avoid damage to the Sciatic Nerve.  We then began our  ambulatory posterior approach to the hip by identifying the short external rotators and tagging these with a #5 Tevdek and releasing them from their insertion on the femur.  We then identified the posterior capsule, released the capsule from it's insertion on the femur and tagged the capsule proximally and distally with a #5 Tevdek.  We then dislocated the hip and made our neck cut roughly 2-3 mm proximal to the lesser trochanter. We measured the head to be 52 mm and our neck cut to be 62 mm.      We then gained exposure of the acetabulum and sequentially reamed from a 36 mm reamer up to a 54 mm reamer.  We had excellent interference fit and a nice bleeding, bony bed for our acetabular component.  We then impacted our permanent acetabular component into place, assured we were completely seated by checking through the apical hole, then placed our permanent polyethylene liner.    We then turned our attention to the femur where we cleared the trochanteric fossa of soft tissue.  We entered the canal with a box chisel, hand held reamer and lateralizing reamer.  We then sequentially broached from a 4 mm broach up to a 14 mm broach.  We trialed with a lateralized neck and std 40 mm head and had excellent stability, range of motion and leg length equality.  An intraoperative radiograph revealed excellent implant position and alignment.  We removed our trials and copiously irrigated the hip.  We then placed our permanent 14 high offset stem and trialed again with a -3, std and +3 40 mm heads.  The std. 40 mm head gave us the best range of motion, stability and leg length equality.  We removed the trials, copiously irrigated the hip and placed our permanent head.  We placed our local anesthetic and gave IV antibiotics.  We then reduced the hip and began our layered closure.  We closed the short external rotators and posterior capsule through two drill holes in the greater trochanter and a soft tissue stitch in the Gluteus Medius.  We  closed the deep fascia with a #1 Vicryl, the deep subcutaneous tissue with a #1 Vicryl, the subcutaneous tissue with 2-0 Vicryl and the skin with 3-0 Monocryl and Dermabond.  We placed a sterile dressing of Xeroform and an Island dressing.  The patient was awakened from general anesthesia and taken to the recovery room in stable condition.    The patient will be started on Aspirin 325mg twice daily for DVT prophylaxis.  IV antibiotics will be discontinued within 24 hours of surgery.  Immediately prior to surgery, there were no acute Thromboembolic nor Cardiovascular risk factors.  An updated Medical Reconciliation form is on the chart.

## 2021-10-06 NOTE — ASU PATIENT PROFILE, ADULT - NSICDXPASTMEDICALHX_GEN_ALL_CORE_FT
PAST MEDICAL HISTORY:  2019 novel coronavirus disease (COVID-19) 3/2020    Anxiety     Autoimmune pancreatitis DAILY PREDNISONE    Chronic GERD     H/O ulcerative colitis     HTN (hypertension)     Pulmonary embolism     Thrombocytopenia

## 2021-10-06 NOTE — PHYSICAL THERAPY INITIAL EVALUATION ADULT - SPECIFY REASON(S)
As discussed with PACU JAMES Rodgers and agreed with patient, PT eval cannot be safely completed at this time as effects of anesthesia have not worn off. Will follow up and complete eval in AM.

## 2021-10-07 ENCOUNTER — TRANSCRIPTION ENCOUNTER (OUTPATIENT)
Age: 76
End: 2021-10-07

## 2021-10-07 VITALS
TEMPERATURE: 96 F | HEART RATE: 81 BPM | RESPIRATION RATE: 18 BRPM | SYSTOLIC BLOOD PRESSURE: 157 MMHG | DIASTOLIC BLOOD PRESSURE: 80 MMHG

## 2021-10-07 DIAGNOSIS — Z02.9 ENCOUNTER FOR ADMINISTRATIVE EXAMINATIONS, UNSPECIFIED: ICD-10-CM

## 2021-10-07 LAB
ANION GAP SERPL CALC-SCNC: 14 MMOL/L — SIGNIFICANT CHANGE UP (ref 7–14)
BUN SERPL-MCNC: 13 MG/DL — SIGNIFICANT CHANGE UP (ref 10–20)
CALCIUM SERPL-MCNC: 9.9 MG/DL — SIGNIFICANT CHANGE UP (ref 8.5–10.1)
CHLORIDE SERPL-SCNC: 106 MMOL/L — SIGNIFICANT CHANGE UP (ref 98–110)
CO2 SERPL-SCNC: 25 MMOL/L — SIGNIFICANT CHANGE UP (ref 17–32)
CREAT SERPL-MCNC: 0.9 MG/DL — SIGNIFICANT CHANGE UP (ref 0.7–1.5)
GLUCOSE SERPL-MCNC: 153 MG/DL — HIGH (ref 70–99)
HCT VFR BLD CALC: 39.4 % — SIGNIFICANT CHANGE UP (ref 37–47)
HGB BLD-MCNC: 11.8 G/DL — LOW (ref 12–16)
MCHC RBC-ENTMCNC: 25.4 PG — LOW (ref 27–31)
MCHC RBC-ENTMCNC: 29.9 G/DL — LOW (ref 32–37)
MCV RBC AUTO: 84.9 FL — SIGNIFICANT CHANGE UP (ref 81–99)
NRBC # BLD: 0 /100 WBCS — SIGNIFICANT CHANGE UP (ref 0–0)
PLATELET # BLD AUTO: 342 K/UL — SIGNIFICANT CHANGE UP (ref 130–400)
POTASSIUM SERPL-MCNC: 4 MMOL/L — SIGNIFICANT CHANGE UP (ref 3.5–5)
POTASSIUM SERPL-SCNC: 4 MMOL/L — SIGNIFICANT CHANGE UP (ref 3.5–5)
RBC # BLD: 4.64 M/UL — SIGNIFICANT CHANGE UP (ref 4.2–5.4)
RBC # FLD: 14.7 % — HIGH (ref 11.5–14.5)
SODIUM SERPL-SCNC: 145 MMOL/L — SIGNIFICANT CHANGE UP (ref 135–146)
WBC # BLD: 16.3 K/UL — HIGH (ref 4.8–10.8)
WBC # FLD AUTO: 16.3 K/UL — HIGH (ref 4.8–10.8)

## 2021-10-07 PROCEDURE — 99221 1ST HOSP IP/OBS SF/LOW 40: CPT

## 2021-10-07 RX ORDER — APIXABAN 2.5 MG/1
2.5 TABLET, FILM COATED ORAL EVERY 12 HOURS
Refills: 0 | Status: DISCONTINUED | OUTPATIENT
Start: 2021-10-07 | End: 2021-10-07

## 2021-10-07 RX ORDER — APIXABAN 2.5 MG/1
5 TABLET, FILM COATED ORAL EVERY 12 HOURS
Refills: 0 | Status: CANCELLED | OUTPATIENT
Start: 2021-10-08 | End: 2021-10-07

## 2021-10-07 RX ORDER — TRAMADOL HYDROCHLORIDE 50 MG/1
1 TABLET ORAL
Qty: 42 | Refills: 0
Start: 2021-10-07 | End: 2021-10-13

## 2021-10-07 RX ORDER — SENNA PLUS 8.6 MG/1
2 TABLET ORAL
Qty: 0 | Refills: 0 | DISCHARGE
Start: 2021-10-07

## 2021-10-07 RX ORDER — APIXABAN 2.5 MG/1
1 TABLET, FILM COATED ORAL
Qty: 0 | Refills: 0 | DISCHARGE
Start: 2021-10-07

## 2021-10-07 RX ORDER — ACETAMINOPHEN 500 MG
2 TABLET ORAL
Qty: 0 | Refills: 0 | DISCHARGE
Start: 2021-10-07

## 2021-10-07 RX ADMIN — AMLODIPINE BESYLATE 5 MILLIGRAM(S): 2.5 TABLET ORAL at 06:14

## 2021-10-07 RX ADMIN — Medication 650 MILLIGRAM(S): at 06:15

## 2021-10-07 RX ADMIN — Medication 10 MILLIGRAM(S): at 06:14

## 2021-10-07 RX ADMIN — APIXABAN 2.5 MILLIGRAM(S): 2.5 TABLET, FILM COATED ORAL at 07:53

## 2021-10-07 RX ADMIN — Medication 15 MILLIGRAM(S): at 00:19

## 2021-10-07 RX ADMIN — Medication 650 MILLIGRAM(S): at 11:50

## 2021-10-07 RX ADMIN — Medication 650 MILLIGRAM(S): at 06:14

## 2021-10-07 RX ADMIN — LISINOPRIL 20 MILLIGRAM(S): 2.5 TABLET ORAL at 06:15

## 2021-10-07 RX ADMIN — CHLORHEXIDINE GLUCONATE 1 APPLICATION(S): 213 SOLUTION TOPICAL at 11:07

## 2021-10-07 RX ADMIN — Medication 15 MILLIGRAM(S): at 00:18

## 2021-10-07 RX ADMIN — Medication 5 MILLIGRAM(S): at 06:14

## 2021-10-07 RX ADMIN — Medication 166.67 MILLIGRAM(S): at 01:58

## 2021-10-07 RX ADMIN — Medication 650 MILLIGRAM(S): at 00:18

## 2021-10-07 RX ADMIN — Medication 2 CAPSULE(S): at 08:11

## 2021-10-07 RX ADMIN — Medication 650 MILLIGRAM(S): at 00:19

## 2021-10-07 RX ADMIN — PANTOPRAZOLE SODIUM 40 MILLIGRAM(S): 20 TABLET, DELAYED RELEASE ORAL at 06:14

## 2021-10-07 NOTE — DISCHARGE NOTE PROVIDER - NSDCCPCAREPLAN_GEN_ALL_CORE_FT
PRINCIPAL DISCHARGE DIAGNOSIS  Diagnosis: S/P total knee arthroplasty, left  Assessment and Plan of Treatment: Keep surgical site clean and dry, may remove dressing in 7  days . Call Dr. Grullon if any wound drainage, redness , increasing pain, fevers over 101 or if you have any questions or concerns.   You may shower with the bandage on and once it is removed. Once it is removed  , do not scrub surgical site. Do not apply any lotions/moisturizers/creams to surgical site.  Call to make your  post op appointment if you do not have one already.

## 2021-10-07 NOTE — OCCUPATIONAL THERAPY INITIAL EVALUATION ADULT - LIVES WITH, PROFILE
Lives in a private home with 6 steps to enter + 6 steps to main level with R side hand rails + walk- in shower + grab bars+ build in shower seat/alone

## 2021-10-07 NOTE — CONSULT NOTE ADULT - ASSESSMENT
76 year old woman s/p orthopedic procedure.    -Leukocytosis likely reactive s/p surgery, patient afebrile and without complaints  -Can be discharged home from a medical standpoint

## 2021-10-07 NOTE — OCCUPATIONAL THERAPY INITIAL EVALUATION ADULT - REHAB POTENTIAL, OT EVAL
Pt. demonstrated good understanding of home safety, adaptive equipment, and safe car transfer/good, to achieve stated therapy goals

## 2021-10-07 NOTE — DISCHARGE NOTE PROVIDER - NSDCMRMEDTOKEN_GEN_ALL_CORE_FT
acetaminophen 325 mg oral tablet: 2 tab(s) orally every 6 hours x 14 days  ALPRAZolam 0.25 mg oral tablet: 1 tab(s) orally once a day, As Needed  amlodipine-benazepril 5 mg-20 mg oral capsule: 1 cap(s) orally 2 times a day  apixaban 2.5 mg oral tablet: 1 tab(s) orally every 12 hours for one dose 10/7/21 evening   apixaban 5 mg oral tablet: 1 tab(s) orally every 12 hours   bisoprolol 10 mg oral tablet: 1 tab(s) orally once a day (in the afternoon)  Creon 36,000 units oral delayed release capsule: 2 tab(s) orally 3 times a day (with meals)  fenofibrate 134 mg oral capsule: 1 cap(s) orally once a day  Lovaza 1000 mg oral capsule: 2 cap(s) orally once a day  predniSONE: 15 milligram(s) orally once a day  Prevacid 30 mg oral delayed release capsule: 1 cap(s) orally once a day  senna oral tablet: 2 tab(s) orally once a day (at bedtime), As Needed  traMADol 50 mg oral tablet: 1 tab(s) orally every 4 hours, As needed, Mild Pain (1 - 3) MDD:6

## 2021-10-07 NOTE — OCCUPATIONAL THERAPY INITIAL EVALUATION ADULT - LEVEL OF INDEPENDENCE: SHOWER, REHAB EVAL
supervision Pt advised to have family member present during shower transfer and to practive with home care therapist prior to attempting on own. Pt verbalized good understanding and agreement./supervision

## 2021-10-07 NOTE — PHYSICAL THERAPY INITIAL EVALUATION ADULT - GAIT DEVIATIONS NOTED, PT EVAL
stooped posture, dec heel strike/pushoff /stance on LLE,dec ANUM; dec LLE ip/knee flexion/decreased molly/decreased velocity of limb motion/decreased weight-shifting ability

## 2021-10-07 NOTE — DISCHARGE NOTE PROVIDER - HOSPITAL COURSE
76 year old female with a past medical history of PE, autoimmune pancreatitis and GERD, was admitted for an elective Total Knee Arthoplasty . The patient tolerated surgery well with no intra/post operative complications. She was given intra/post operative antibiotics for infection prophylaxis and will continue her Eliquis at home for PE and it will also lower the risk of blood clots post operatively. She worked with Physical Therapy while admitted to the hospital and is stable for discharge.

## 2021-10-07 NOTE — PHYSICAL THERAPY INITIAL EVALUATION ADULT - GENERAL OBSERVATIONS, REHAB EVAL
08:15-08:45 Chart reviewed. Pt encountered sitting in chair, may be seen by Physical Therapist as confirmed with Nurse. Patient denied pain at rest and ready for therapy now' +Ace wrap LLE/compression socks/heplock/Ileostomy bag

## 2021-10-07 NOTE — PHYSICAL THERAPY INITIAL EVALUATION ADULT - MANUAL MUSCLE TESTING RESULTS, REHAB EVAL
RLE ms strength grossly graded 3+to 4-/5; (L) hip/ankle 3 to 3+/5; (L) knee 3-/4; Please refer to OT chiki for BUE

## 2021-10-07 NOTE — CONSULT NOTE ADULT - SUBJECTIVE AND OBJECTIVE BOX
76 year old woman s/p orthopedic procedure.    Today:  Seen at bedside, denies chest pain, SOB, palpitations, diaphoresis, lightheadedness.            REVIEW OF SYSTEMS:  CONSTITUTIONAL: No fever, weight loss, or fatigue  EYES: No eye pain, visual disturbances, or discharge  ENMT:  No difficulty hearing, tinnitus, vertigo; No sinus or throat pain  NECK: No pain or stiffness  RESPIRATORY: No cough, wheezing, chills or hemoptysis; No shortness of breath  CARDIOVASCULAR: No chest pain, palpitations, dizziness, or leg swelling  GASTROINTESTINAL: No abdominal or epigastric pain. No nausea, vomiting, or hematemesis; No diarrhea or constipation. No melena or hematochezia.  GENITOURINARY: No dysuria, frequency, hematuria, or incontinence  NEUROLOGICAL: No headaches, memory loss, loss of strength, numbness, or tremors  SKIN: No itching, burning, rashes, or lesions   LYMPH NODES: No enlarged glands  ENDOCRINE: No heat or cold intolerance; No hair loss  PSYCHIATRIC: No depression, anxiety, mood swings, or difficulty sleeping  HEME/LYMPH: No easy bruising, or bleeding gums  ALLERGY AND IMMUNOLOGIC: No hives or eczema      MEDICATIONS  (STANDING):  acetaminophen   Tablet .. 650 milliGRAM(s) Oral every 6 hours  amLODIPine   Tablet 5 milliGRAM(s) Oral <User Schedule>  apixaban 2.5 milliGRAM(s) Oral every 12 hours  chlorhexidine 4% Liquid 1 Application(s) Topical daily  dexAMETHasone     Tablet 2 milliGRAM(s) Oral once  fenofibrate Tablet 145 milliGRAM(s) Oral daily  ketorolac   Injectable 15 milliGRAM(s) IV Push every 6 hours  lisinopril 20 milliGRAM(s) Oral every 12 hours  pancrelipase  (CREON 36,000 Lipase Units) 2 Capsule(s) Oral three times a day with meals  pantoprazole    Tablet 40 milliGRAM(s) Oral before breakfast  polyethylene glycol 3350 17 Gram(s) Oral at bedtime  predniSONE   Tablet 10 milliGRAM(s) Oral daily  predniSONE   Tablet 5 milliGRAM(s) Oral daily  senna 2 Tablet(s) Oral at bedtime  sodium chloride 0.9%. 1000 milliLiter(s) (75 mL/Hr) IV Continuous <Continuous>  vancomycin  IVPB 1250 milliGRAM(s) IV Intermittent once    MEDICATIONS  (PRN):  ALPRAZolam 0.25 milliGRAM(s) Oral daily PRN anxiety  magnesium hydroxide Suspension 30 milliLiter(s) Oral daily PRN Constipation  ondansetron Injectable 4 milliGRAM(s) IV Push every 6 hours PRN Nausea and/or Vomiting  traMADol 50 milliGRAM(s) Oral every 4 hours PRN Mild Pain (1 - 3)      Allergies  Ceftin (Anaphylaxis)  Cipro (Anaphylaxis)  sulfa drugs (Rash)          FAMILY HISTORY:  FH: brain tumor (Mother)        Vital Signs Last 24 Hrs  T(C): 35.6 (07 Oct 2021 08:17), Max: 36.7 (06 Oct 2021 16:53)  T(F): 96.1 (07 Oct 2021 08:17), Max: 98.1 (06 Oct 2021 16:53)  HR: 81 (07 Oct 2021 08:17) (71 - 87)  BP: 157/80 (07 Oct 2021 08:17) (134/64 - 184/83)  RR: 18 (07 Oct 2021 08:17) (16 - 24)  SpO2: 96% (06 Oct 2021 17:30) (95% - 99%)    PHYSICAL EXAM:  GENERAL: NAD, well-groomed, well-developed  HEAD:  Atraumatic, Normocephalic  EYES: EOMI, PERRLA, conjunctiva and sclera clear  ENMT: No tonsillar erythema, exudates, or enlargement; Moist mucous membranes, Good dentition, No lesions  NECK: Supple, No JVD, Normal thyroid  NERVOUS SYSTEM:  Alert & Oriented X3, Good concentration  CHEST/LUNG: Clear to percussion bilaterally; No rales, rhonchi, wheezing, or rubs  HEART: Regular rate and rhythm; No murmurs, rubs, or gallops  ABDOMEN: Soft, Nontender, Nondistended; Bowel sounds present, ileostomy site clean      LABS:                        11.8   16.30 )-----------( 342      ( 07 Oct 2021 07:50 )             39.4     10-07    145  |  106  |  13  ----------------------------<  153<H>  4.0   |  25  |  0.9    Ca    9.9      07 Oct 2021 07:50

## 2021-10-07 NOTE — PROGRESS NOTE ADULT - SUBJECTIVE AND OBJECTIVE BOX
ORTHO PROGRESS NOTE       76y Female POD #  1    S/P left Total Knee Arthoplasty     Patient seen and examined at bedside . The patient is awake and alert in NAD. No complaints of chest pain, SOB, N/V. Pain is controlled    PAST MEDICAL & SURGICAL HISTORY:  H/O ulcerative colitis    Autoimmune pancreatitis  DAILY PREDNISONE    Chronic GERD    HTN (hypertension)    Pulmonary embolism    2019 novel coronavirus disease (COVID-19)  3/2020    Anxiety    Thrombocytopenia    S/P total colectomy    S/P ileostomy    H/O total hysterectomy    History of laparoscopic cholecystectomy          MEDICATIONS  (STANDING):  acetaminophen   Tablet .. 650 milliGRAM(s) Oral every 6 hours  amLODIPine   Tablet 5 milliGRAM(s) Oral <User Schedule>  chlorhexidine 4% Liquid 1 Application(s) Topical daily  dexAMETHasone     Tablet 2 milliGRAM(s) Oral once  fenofibrate Tablet 145 milliGRAM(s) Oral daily  ketorolac   Injectable 15 milliGRAM(s) IV Push every 6 hours  lisinopril 20 milliGRAM(s) Oral every 12 hours  pancrelipase  (CREON 36,000 Lipase Units) 2 Capsule(s) Oral three times a day with meals  pantoprazole    Tablet 40 milliGRAM(s) Oral before breakfast  polyethylene glycol 3350 17 Gram(s) Oral at bedtime  predniSONE   Tablet 10 milliGRAM(s) Oral daily  predniSONE   Tablet 5 milliGRAM(s) Oral daily  senna 2 Tablet(s) Oral at bedtime  sodium chloride 0.9%. 1000 milliLiter(s) (75 mL/Hr) IV Continuous <Continuous>  vancomycin  IVPB 1250 milliGRAM(s) IV Intermittent once    MEDICATIONS  (PRN):  ALPRAZolam 0.25 milliGRAM(s) Oral daily PRN anxiety  magnesium hydroxide Suspension 30 milliLiter(s) Oral daily PRN Constipation  ondansetron Injectable 4 milliGRAM(s) IV Push every 6 hours PRN Nausea and/or Vomiting  traMADol 50 milliGRAM(s) Oral every 4 hours PRN Mild Pain (1 - 3)        Vital Signs Last 24 Hrs  T(C): 35.6 (07 Oct 2021 08:17), Max: 36.7 (06 Oct 2021 16:53)  T(F): 96.1 (07 Oct 2021 08:17), Max: 98.1 (06 Oct 2021 16:53)  HR: 81 (07 Oct 2021 08:17) (71 - 87)  BP: 157/80 (07 Oct 2021 08:17) (134/64 - 184/83)  BP(mean): --  RR: 18 (07 Oct 2021 08:17) (16 - 24)  SpO2: 96% (06 Oct 2021 17:30) (95% - 99%)                          11.8   16.30 )-----------( 342      ( 07 Oct 2021 07:50 )             39.4     10-07    145  |  106  |  13  ----------------------------<  153<H>  4.0   |  25  |  0.9    Ca    9.9      07 Oct 2021 07:50                PE:  The patient was seen and examined at bedside          A&OX3, NAD          Aquacel  dressing C/D/I          Compartments soft, BLE SCD in place          NVI, SILT           A/P:              POD #   1    s/p    left Total Knee Arthoplasty                    OOB to Chair            Physical Therapy- wbat            Pain control - per pain protocol            Incentive Spirometry            DVT Prophylaxis - eliquis                GI ppx- continue Protonix                   Dispo: home with home care

## 2021-10-07 NOTE — PHYSICAL THERAPY INITIAL EVALUATION ADULT - ACTIVE RANGE OF MOTION EXAMINATION, REHAB EVAL
LLE joints required AAROM./AROM with (L) knee flexion 0-90 degrees in supine; Please refer to OT eval for BUE/Right LE Active ROM was WFL (within functional limits)

## 2021-10-07 NOTE — OCCUPATIONAL THERAPY INITIAL EVALUATION ADULT - PERTINENT HX OF CURRENT PROBLEM, REHAB EVAL
Admitted for OA of left knee s/p left total knee replacement; POD1 Admitted for OA of left knee s/p left total knee replacement with regional anesthesia; POD1

## 2021-10-07 NOTE — DISCHARGE NOTE PROVIDER - CARE PROVIDER_API CALL
Puneet Mckeon)  Orthopaedic Surgery  [address] [place] [address]  Phone: [phone]  Fax: [phone]  Follow Up Time:

## 2021-10-07 NOTE — OCCUPATIONAL THERAPY INITIAL EVALUATION ADULT - GENERAL OBSERVATIONS, REHAB EVAL
9:06-9:31 chart reviewed, ok to treat by Occupational Therapist as confirmed by RN Esmer, Pt received sitting in bedside chair + Ace bandage L knee (removed by PA) + aqaucel dressing L knee in NAD. Pt. in agreement with OT IE. 9:06-9:31 chart reviewed, ok to treat by Occupational Therapist as confirmed by RN Esmer, Pt received sitting in bedside chair + Ace bandage L knee (removed by PA) + aqaucel dressing L knee +ileostomy bag in NAD. Pt. in agreement with OT IE.

## 2021-10-07 NOTE — DISCHARGE NOTE NURSING/CASE MANAGEMENT/SOCIAL WORK - PATIENT PORTAL LINK FT
You can access the FollowMyHealth Patient Portal offered by [facility] by registering at the following website: [email or web address]. By joining [employer]’s FollowMyHealth portal, you will also be able to view your health information using other applications (apps) compatible with our system.

## 2021-10-07 NOTE — PHYSICAL THERAPY INITIAL EVALUATION ADULT - ADDITIONAL COMMENTS
Per patient, from the garage, she has 6 steps/landing/6 steps with (R) rail going up and wall on (L) side to enter home, then no further steps inside home; has a cane

## 2021-10-08 LAB
COVID-19 SPIKE DOMAIN AB INTERP: POSITIVE
COVID-19 SPIKE DOMAIN ANTIBODY RESULT: >250 U/ML — HIGH
SARS-COV-2 IGG+IGM SERPL QL IA: >250 U/ML — HIGH
SARS-COV-2 IGG+IGM SERPL QL IA: POSITIVE

## 2021-10-08 RX ORDER — APIXABAN 2.5 MG/1
1 TABLET, FILM COATED ORAL
Qty: 0 | Refills: 0 | DISCHARGE
Start: 2021-10-08

## 2021-10-21 DIAGNOSIS — K21.9 GASTRO-ESOPHAGEAL REFLUX DISEASE WITHOUT ESOPHAGITIS: ICD-10-CM

## 2021-10-21 DIAGNOSIS — D72.829 ELEVATED WHITE BLOOD CELL COUNT, UNSPECIFIED: ICD-10-CM

## 2021-10-21 DIAGNOSIS — F41.9 ANXIETY DISORDER, UNSPECIFIED: ICD-10-CM

## 2021-10-21 DIAGNOSIS — Z88.2 ALLERGY STATUS TO SULFONAMIDES: ICD-10-CM

## 2021-10-21 DIAGNOSIS — I10 ESSENTIAL (PRIMARY) HYPERTENSION: ICD-10-CM

## 2021-10-21 DIAGNOSIS — Z88.1 ALLERGY STATUS TO OTHER ANTIBIOTIC AGENTS STATUS: ICD-10-CM

## 2021-10-21 DIAGNOSIS — M17.12 UNILATERAL PRIMARY OSTEOARTHRITIS, LEFT KNEE: ICD-10-CM

## 2021-10-21 DIAGNOSIS — Z86.16 PERSONAL HISTORY OF COVID-19: ICD-10-CM

## 2021-10-21 DIAGNOSIS — Z86.711 PERSONAL HISTORY OF PULMONARY EMBOLISM: ICD-10-CM

## 2021-11-11 ENCOUNTER — EMERGENCY (EMERGENCY)
Facility: HOSPITAL | Age: 76
LOS: 0 days | Discharge: HOME | End: 2021-11-12
Attending: EMERGENCY MEDICINE | Admitting: EMERGENCY MEDICINE
Payer: MEDICARE

## 2021-11-11 VITALS — SYSTOLIC BLOOD PRESSURE: 184 MMHG | DIASTOLIC BLOOD PRESSURE: 85 MMHG | TEMPERATURE: 96 F | HEART RATE: 76 BPM

## 2021-11-11 DIAGNOSIS — R10.9 UNSPECIFIED ABDOMINAL PAIN: ICD-10-CM

## 2021-11-11 DIAGNOSIS — Z90.710 ACQUIRED ABSENCE OF BOTH CERVIX AND UTERUS: Chronic | ICD-10-CM

## 2021-11-11 DIAGNOSIS — I10 ESSENTIAL (PRIMARY) HYPERTENSION: ICD-10-CM

## 2021-11-11 DIAGNOSIS — Z86.16 PERSONAL HISTORY OF COVID-19: ICD-10-CM

## 2021-11-11 DIAGNOSIS — Z88.1 ALLERGY STATUS TO OTHER ANTIBIOTIC AGENTS STATUS: ICD-10-CM

## 2021-11-11 DIAGNOSIS — Z90.49 ACQUIRED ABSENCE OF OTHER SPECIFIED PARTS OF DIGESTIVE TRACT: Chronic | ICD-10-CM

## 2021-11-11 DIAGNOSIS — Z86.711 PERSONAL HISTORY OF PULMONARY EMBOLISM: ICD-10-CM

## 2021-11-11 DIAGNOSIS — Z96.652 PRESENCE OF LEFT ARTIFICIAL KNEE JOINT: ICD-10-CM

## 2021-11-11 DIAGNOSIS — Z93.2 ILEOSTOMY STATUS: Chronic | ICD-10-CM

## 2021-11-11 DIAGNOSIS — Z79.01 LONG TERM (CURRENT) USE OF ANTICOAGULANTS: ICD-10-CM

## 2021-11-11 DIAGNOSIS — Z88.2 ALLERGY STATUS TO SULFONAMIDES: ICD-10-CM

## 2021-11-11 DIAGNOSIS — Z20.822 CONTACT WITH AND (SUSPECTED) EXPOSURE TO COVID-19: ICD-10-CM

## 2021-11-11 PROCEDURE — 99285 EMERGENCY DEPT VISIT HI MDM: CPT

## 2021-11-11 RX ORDER — IOHEXOL 300 MG/ML
30 INJECTION, SOLUTION INTRAVENOUS ONCE
Refills: 0 | Status: COMPLETED | OUTPATIENT
Start: 2021-11-11 | End: 2021-11-12

## 2021-11-11 RX ORDER — MORPHINE SULFATE 50 MG/1
4 CAPSULE, EXTENDED RELEASE ORAL ONCE
Refills: 0 | Status: DISCONTINUED | OUTPATIENT
Start: 2021-11-11 | End: 2021-11-11

## 2021-11-12 VITALS — HEART RATE: 84 BPM | DIASTOLIC BLOOD PRESSURE: 89 MMHG | TEMPERATURE: 97 F | SYSTOLIC BLOOD PRESSURE: 149 MMHG

## 2021-11-12 LAB
ALBUMIN SERPL ELPH-MCNC: 4.1 G/DL — SIGNIFICANT CHANGE UP (ref 3.5–5.2)
ALP SERPL-CCNC: 75 U/L — SIGNIFICANT CHANGE UP (ref 30–115)
ALT FLD-CCNC: 35 U/L — SIGNIFICANT CHANGE UP (ref 0–41)
ANION GAP SERPL CALC-SCNC: 16 MMOL/L — HIGH (ref 7–14)
APPEARANCE UR: CLEAR — SIGNIFICANT CHANGE UP
AST SERPL-CCNC: 27 U/L — SIGNIFICANT CHANGE UP (ref 0–41)
BACTERIA # UR AUTO: ABNORMAL
BASOPHILS # BLD AUTO: 0.06 K/UL — SIGNIFICANT CHANGE UP (ref 0–0.2)
BASOPHILS NFR BLD AUTO: 0.5 % — SIGNIFICANT CHANGE UP (ref 0–1)
BILIRUB DIRECT SERPL-MCNC: <0.2 MG/DL — SIGNIFICANT CHANGE UP (ref 0–0.2)
BILIRUB INDIRECT FLD-MCNC: >0.1 MG/DL — LOW (ref 0.2–1.2)
BILIRUB SERPL-MCNC: 0.3 MG/DL — SIGNIFICANT CHANGE UP (ref 0.2–1.2)
BILIRUB UR-MCNC: NEGATIVE — SIGNIFICANT CHANGE UP
BUN SERPL-MCNC: 20 MG/DL — SIGNIFICANT CHANGE UP (ref 10–20)
CALCIUM SERPL-MCNC: 10 MG/DL — SIGNIFICANT CHANGE UP (ref 8.5–10.1)
CHLORIDE SERPL-SCNC: 104 MMOL/L — SIGNIFICANT CHANGE UP (ref 98–110)
CO2 SERPL-SCNC: 19 MMOL/L — SIGNIFICANT CHANGE UP (ref 17–32)
COD CRY URNS QL: ABNORMAL
COLOR SPEC: YELLOW — SIGNIFICANT CHANGE UP
CREAT SERPL-MCNC: 1 MG/DL — SIGNIFICANT CHANGE UP (ref 0.7–1.5)
DIFF PNL FLD: ABNORMAL
EOSINOPHIL # BLD AUTO: 0.1 K/UL — SIGNIFICANT CHANGE UP (ref 0–0.7)
EOSINOPHIL NFR BLD AUTO: 0.8 % — SIGNIFICANT CHANGE UP (ref 0–8)
EPI CELLS # UR: ABNORMAL /HPF
GLUCOSE SERPL-MCNC: 123 MG/DL — HIGH (ref 70–99)
GLUCOSE UR QL: NEGATIVE MG/DL — SIGNIFICANT CHANGE UP
HCT VFR BLD CALC: 39 % — SIGNIFICANT CHANGE UP (ref 37–47)
HGB BLD-MCNC: 11.7 G/DL — LOW (ref 12–16)
IMM GRANULOCYTES NFR BLD AUTO: 1.4 % — HIGH (ref 0.1–0.3)
KETONES UR-MCNC: ABNORMAL
LACTATE SERPL-SCNC: 2.3 MMOL/L — HIGH (ref 0.7–2)
LEUKOCYTE ESTERASE UR-ACNC: ABNORMAL
LIDOCAIN IGE QN: 40 U/L — SIGNIFICANT CHANGE UP (ref 7–60)
LYMPHOCYTES # BLD AUTO: 1.78 K/UL — SIGNIFICANT CHANGE UP (ref 1.2–3.4)
LYMPHOCYTES # BLD AUTO: 14.7 % — LOW (ref 20.5–51.1)
MCHC RBC-ENTMCNC: 25.4 PG — LOW (ref 27–31)
MCHC RBC-ENTMCNC: 30 G/DL — LOW (ref 32–37)
MCV RBC AUTO: 84.6 FL — SIGNIFICANT CHANGE UP (ref 81–99)
MONOCYTES # BLD AUTO: 0.9 K/UL — HIGH (ref 0.1–0.6)
MONOCYTES NFR BLD AUTO: 7.4 % — SIGNIFICANT CHANGE UP (ref 1.7–9.3)
NEUTROPHILS # BLD AUTO: 9.1 K/UL — HIGH (ref 1.4–6.5)
NEUTROPHILS NFR BLD AUTO: 75.2 % — SIGNIFICANT CHANGE UP (ref 42.2–75.2)
NITRITE UR-MCNC: NEGATIVE — SIGNIFICANT CHANGE UP
NRBC # BLD: 0 /100 WBCS — SIGNIFICANT CHANGE UP (ref 0–0)
PH UR: 5.5 — SIGNIFICANT CHANGE UP (ref 5–8)
PLATELET # BLD AUTO: 372 K/UL — SIGNIFICANT CHANGE UP (ref 130–400)
POTASSIUM SERPL-MCNC: 4.5 MMOL/L — SIGNIFICANT CHANGE UP (ref 3.5–5)
POTASSIUM SERPL-SCNC: 4.5 MMOL/L — SIGNIFICANT CHANGE UP (ref 3.5–5)
PROT SERPL-MCNC: 6.8 G/DL — SIGNIFICANT CHANGE UP (ref 6–8)
PROT UR-MCNC: ABNORMAL MG/DL
RBC # BLD: 4.61 M/UL — SIGNIFICANT CHANGE UP (ref 4.2–5.4)
RBC # FLD: 16 % — HIGH (ref 11.5–14.5)
RBC CASTS # UR COMP ASSIST: ABNORMAL /HPF
SARS-COV-2 RNA SPEC QL NAA+PROBE: SIGNIFICANT CHANGE UP
SODIUM SERPL-SCNC: 139 MMOL/L — SIGNIFICANT CHANGE UP (ref 135–146)
SP GR SPEC: >=1.03 (ref 1.01–1.03)
UROBILINOGEN FLD QL: 0.2 MG/DL — SIGNIFICANT CHANGE UP
WBC # BLD: 12.11 K/UL — HIGH (ref 4.8–10.8)
WBC # FLD AUTO: 12.11 K/UL — HIGH (ref 4.8–10.8)
WBC UR QL: ABNORMAL /HPF

## 2021-11-12 PROCEDURE — 93010 ELECTROCARDIOGRAM REPORT: CPT

## 2021-11-12 PROCEDURE — 74177 CT ABD & PELVIS W/CONTRAST: CPT | Mod: 26,MA

## 2021-11-12 RX ORDER — MORPHINE SULFATE 50 MG/1
4 CAPSULE, EXTENDED RELEASE ORAL ONCE
Refills: 0 | Status: DISCONTINUED | OUTPATIENT
Start: 2021-11-12 | End: 2021-11-12

## 2021-11-12 RX ORDER — ONDANSETRON 8 MG/1
4 TABLET, FILM COATED ORAL ONCE
Refills: 0 | Status: COMPLETED | OUTPATIENT
Start: 2021-11-12 | End: 2021-11-12

## 2021-11-12 RX ORDER — CEFPODOXIME PROXETIL 100 MG
1 TABLET ORAL
Qty: 20 | Refills: 0
Start: 2021-11-12 | End: 2021-11-21

## 2021-11-12 RX ADMIN — IOHEXOL 30 MILLILITER(S): 300 INJECTION, SOLUTION INTRAVENOUS at 00:27

## 2021-11-12 RX ADMIN — MORPHINE SULFATE 4 MILLIGRAM(S): 50 CAPSULE, EXTENDED RELEASE ORAL at 00:27

## 2021-11-12 RX ADMIN — MORPHINE SULFATE 4 MILLIGRAM(S): 50 CAPSULE, EXTENDED RELEASE ORAL at 01:57

## 2021-11-12 RX ADMIN — ONDANSETRON 4 MILLIGRAM(S): 8 TABLET, FILM COATED ORAL at 01:57

## 2021-11-12 NOTE — ED PROVIDER NOTE - CARE PROVIDER_API CALL
Adonay Villavicencio)  Urology  [address] [place] [address], [address]  [place] [address]  Phone: [phone]  Fax: [phone]  Follow Up Time: 1-3 Days

## 2021-11-12 NOTE — ED PROVIDER NOTE - PATIENT PORTAL LINK FT
You can access the FollowMyHealth Patient Portal offered by [facility] by registering at the following website: [email or web address]. By joining [employer]’s FollowMyHealth portal, you will also be able to view your health information using other applications (apps) compatible with our system.

## 2021-11-12 NOTE — ED PROVIDER NOTE - OBJECTIVE STATEMENT
77 yo female, pmh of autoimmune pancreatitis, PE on eliquis, recent left total knee replacement, s/p ileostomy w colostomy in place, UC, presents to ed for right flank pain, mild, aching, no radiation, started today. denies fever, chills, cp, sob, nvd, dysuria, hematuria.

## 2021-11-12 NOTE — ED PROVIDER NOTE - CLINICAL SUMMARY MEDICAL DECISION MAKING FREE TEXT BOX
76yF autoimmune pancreatitis hx DVT/PE on eliquis recent L knee replacement ulcerative colitis s/p ileostomy/colostomy p/w R flank pain.  Abd soft/benign.  Labs reassuring.  CT c/w recently passed stone.  UA contaminated.  Pain treated and pt feeling better.  Ok to dc with supportive care, o/p f/u with PCP and urology, return precautions.

## 2021-11-12 NOTE — ED PROVIDER NOTE - NSFOLLOWUPINSTRUCTIONS_ED_ALL_ED_FT
Urinary Tract Infection    A urinary tract infection (UTI) is an infection of any part of the urinary tract, which includes the kidneys, ureters, bladder, and urethra. Risk factors include ignoring your need to urinate, wiping back to front if female, being an uncircumcised male, and having diabetes or a weak immune system. Symptoms include frequent urination, pain or burning with urination, foul smelling urine, cloudy urine, pain in the lower abdomen, blood in the urine, and fever. If you were prescribed an antibiotic medicine, take it as told by your health care provider. Do not stop taking the antibiotic even if you start to feel better.    SEEK IMMEDIATE MEDICAL CARE IF YOU HAVE ANY OF THE FOLLOWING SYMPTOMS: severe back or abdominal pain, fever, inability to keep fluids or medicine down, dizziness/lightheadedness, or a change in mental status.       Flank Pain, Adult  Flank pain is pain that is located on the side of the body between the upper abdomen and the back. This area is called the flank. The pain may occur over a short period of time (acute), or it may be long-term or recurring (chronic). It may be mild or severe. Flank pain can be caused by many things, including:    Muscle soreness or injury.  Kidney stones or kidney disease.  Stress.  A disease of the spine (vertebral disk disease).  A lung infection (pneumonia).  Fluid around the lungs (pulmonary edema).  A skin rash caused by the chickenpox virus (shingles).  Tumors that affect the back of the abdomen.  Gallbladder disease.    Follow these instructions at home:  Drink enough fluid to keep your urine clear or pale yellow.  Rest as told by your health care provider.  Take over-the-counter and prescription medicines only as told by your health care provider.  Keep a journal to track what has caused your flank pain and what has made it feel better.  ImageKeep all follow-up visits as told by your health care provider. This is important.  Contact a health care provider if:  Your pain is not controlled with medicine.  You have new symptoms.  Your pain gets worse.  You have a fever.  Your symptoms last longer than 2–3 days.  You have trouble urinating or you are urinating very frequently.  Get help right away if:  You have trouble breathing or you are short of breath.  Your abdomen hurts or it is swollen or red.  You have nausea or vomiting.  You feel faint or you pass out.  You have blood in your urine.  Summary  Flank pain is pain that is located on the side of the body between the upper abdomen and the back.  The pain may occur over a short period of time (acute), or it may be long-term or recurring (chronic). It may be mild or severe.  Flank pain can be caused by many things.  Contact your health care provider if your symptoms get worse or they last longer than 2–3 days.  This information is not intended to replace advice given to you by your health care provider. Make sure you discuss any questions you have with your health care provider.

## 2021-11-12 NOTE — ED PROVIDER NOTE - CARE PLAN
1 Principal Discharge DX:	Acute UTI  Secondary Diagnosis:	Flank pain   Principal Discharge DX:	Flank pain

## 2021-11-12 NOTE — ED PROVIDER NOTE - ATTENDING CONTRIBUTION TO CARE
76yF p/w R flank pain radiating to R groin.  Pain started ~2hr prior to arrival.  +nausea/vomiting.  No fever or diarrhea.  +hx abd surgeries - ileostomy/colostomy.  exam w/ mild R flank tenderness w/o r/g.

## 2021-11-22 ENCOUNTER — APPOINTMENT (OUTPATIENT)
Dept: CARDIOLOGY | Facility: CLINIC | Age: 76
End: 2021-11-22
Payer: MEDICARE

## 2021-11-22 VITALS
DIASTOLIC BLOOD PRESSURE: 66 MMHG | HEIGHT: 62 IN | TEMPERATURE: 97.2 F | BODY MASS INDEX: 34.7 KG/M2 | HEART RATE: 76 BPM | SYSTOLIC BLOOD PRESSURE: 133 MMHG | WEIGHT: 188.56 LBS

## 2021-11-22 PROCEDURE — 93000 ELECTROCARDIOGRAM COMPLETE: CPT

## 2021-11-22 PROCEDURE — 99213 OFFICE O/P EST LOW 20 MIN: CPT

## 2021-11-22 RX ORDER — AMLODIPINE BESYLATE AND BENAZEPRIL HYDROCHLORIDE 5; 20 MG/1; MG/1
5-20 CAPSULE ORAL
Qty: 90 | Refills: 3 | Status: DISCONTINUED | COMMUNITY
End: 2021-11-22

## 2021-11-22 NOTE — PHYSICAL EXAM
[de-identified] : well appearing, overweight, no distress [de-identified] : reg, nL s1/s2, no m/r/g [de-identified] : CTA [de-identified] : alert, normal affect, logical conversation

## 2021-11-22 NOTE — REASON FOR VISIT
[Follow-Up - Clinic] : a clinic follow-up of [Hypertension] : hypertension [FreeTextEntry1] : Feels well.\par \par No interval cardiac symptoms.\par \par Mild edema better on HCTZ.\par \par Underwent knee replacement without complication.  Recovering well.\par \par 2 interval hospitalizations for nephrolithiasis.  Following with urology.\par \par Persistent prednisone for autoimmune pancreatitis.\par \par Weight stable.

## 2021-11-22 NOTE — DISCUSSION/SUMMARY
[FreeTextEntry1] : Cont Amlodipine-Benazepril.\par Cont Bisoprolol.\par Cont HCTZ.\par Cont Eliquis per pulmonary.\par Weight loss / light exercise.\par Follow-up 6-months.

## 2021-11-23 ENCOUNTER — RESULT REVIEW (OUTPATIENT)
Age: 76
End: 2021-11-23

## 2021-11-23 ENCOUNTER — APPOINTMENT (OUTPATIENT)
Dept: UROLOGY | Facility: CLINIC | Age: 76
End: 2021-11-23
Payer: MEDICARE

## 2021-11-23 PROCEDURE — 99214 OFFICE O/P EST MOD 30 MIN: CPT

## 2021-11-23 NOTE — ASSESSMENT
[FreeTextEntry1] : 76 year old with recent ER visit for possible passage of kidney stone. \par I reviewed CT scan from 11/12/2021 and compared it to the CT scans from 07/2021. \par 11/2021 CT scan has worse dilation of ureter and what appears to be sand stone fragments at the level of the UVJ which is consistent with passage of Renal Stone. There is also inflammation around the Right Kidney. likely forniceal rupture \par \par Patient is currently feeling well. \par \par Will order Kidney Bladder US to be in in 3.5 weeks to assess resolution of hydronephrosis. \par Patient to follow up 1 month to review.

## 2021-11-23 NOTE — HISTORY OF PRESENT ILLNESS
[FreeTextEntry1] : This is a 76 year old female who was seen in ER in July of 2021 for flank pain. CT scan at the time revealed R sided hydronephrosis related to probable passage of R UVJ stone. Follow up CT scan revealed resolution. \par Patient had metabolic work up which revealed low urine volume and low citrate. \par Patient presents to office today after another ER visit for R sided flank pain. CT scan in ER 11/12/2021 revealed "Mild right hydroureteronephrosis with associated inflammation without evidence for obstructing calculus. Findings may reflect a recently passed calculus and/or infection." I reviewed images\par 11-25 RBC on UA microscopic in ER. \par \par Patient states she is currently feeling well. Denies flank pain, dysuria, and gross hematuria. \par Patient reports that she has increased her water intake and has been squeezing lemon juice in the water.

## 2021-11-23 NOTE — PHYSICAL EXAM
[General Appearance - In No Acute Distress] : no acute distress [] : no respiratory distress [Oriented To Time, Place, And Person] : oriented to person, place, and time [FreeTextEntry1] : Ambulates with cane.

## 2021-11-23 NOTE — END OF VISIT
[FreeTextEntry3] : I participated in obtaining the history, exam, reviewed all images, formulated a treatment plan which I discussed with the patient and agree with the above transcription by the physicians assistant

## 2021-12-01 ENCOUNTER — OUTPATIENT (OUTPATIENT)
Dept: OUTPATIENT SERVICES | Facility: HOSPITAL | Age: 76
LOS: 1 days | Discharge: HOME | End: 2021-12-01
Payer: MEDICARE

## 2021-12-01 DIAGNOSIS — Z93.2 ILEOSTOMY STATUS: Chronic | ICD-10-CM

## 2021-12-01 DIAGNOSIS — Z90.49 ACQUIRED ABSENCE OF OTHER SPECIFIED PARTS OF DIGESTIVE TRACT: Chronic | ICD-10-CM

## 2021-12-01 DIAGNOSIS — Z90.710 ACQUIRED ABSENCE OF BOTH CERVIX AND UTERUS: Chronic | ICD-10-CM

## 2021-12-01 DIAGNOSIS — N20.0 CALCULUS OF KIDNEY: ICD-10-CM

## 2021-12-01 PROCEDURE — 76770 US EXAM ABDO BACK WALL COMP: CPT | Mod: 26

## 2021-12-22 ENCOUNTER — APPOINTMENT (OUTPATIENT)
Dept: UROLOGY | Facility: CLINIC | Age: 76
End: 2021-12-22
Payer: MEDICARE

## 2021-12-22 VITALS — HEIGHT: 62 IN | BODY MASS INDEX: 34.6 KG/M2 | WEIGHT: 188 LBS

## 2021-12-22 DIAGNOSIS — N20.0 CALCULUS OF KIDNEY: ICD-10-CM

## 2021-12-22 PROCEDURE — 99213 OFFICE O/P EST LOW 20 MIN: CPT

## 2021-12-22 NOTE — HISTORY OF PRESENT ILLNESS
[FreeTextEntry1] : Patient with recent episode of renal colic and right-sided hydronephrosis consistent with recent passage of stone.  She underwent a follow-up renal bladder ultrasound shows no stones and resolution of hydronephrosis.  She feels well.  She has a history of low urinary volume and low urinary citrate on metabolic stone work-up.

## 2021-12-22 NOTE — ASSESSMENT
[FreeTextEntry1] : Resolution of hydronephrosis consistent with passage of kidney stone.  Reiterated metabolic stone work-up and she is complying with good water intake supplemented with lemon juice. [Hydronephrosis (591\N13.30)] : Salter-Antonio type I

## 2021-12-27 NOTE — PHYSICAL EXAM
Pt is able to be on the side   [de-identified] : well appearing, overweight, no distress [de-identified] : reg, nL s1/s2, no m/r/g [de-identified] : CTA [de-identified] : warm, trace edema [de-identified] : alert, normal affect, logical conversation

## 2022-01-06 ENCOUNTER — APPOINTMENT (OUTPATIENT)
Age: 77
End: 2022-01-06
Payer: MEDICARE

## 2022-01-06 VITALS
OXYGEN SATURATION: 96 % | HEIGHT: 62 IN | SYSTOLIC BLOOD PRESSURE: 140 MMHG | WEIGHT: 188 LBS | BODY MASS INDEX: 34.6 KG/M2 | DIASTOLIC BLOOD PRESSURE: 60 MMHG | RESPIRATION RATE: 12 BRPM | HEART RATE: 98 BPM

## 2022-01-06 PROCEDURE — 99213 OFFICE O/P EST LOW 20 MIN: CPT

## 2022-01-06 NOTE — REASON FOR VISIT
None [Follow-Up] : a follow-up visit [Pulmonary Embolism] : pulmonary embolism [Shortness of Breath] : shortness of breath

## 2022-02-20 NOTE — ASU PATIENT PROFILE, ADULT - PRESSURE ULCER(S)
Jewel Mendoza discharged to home accompanied by wife. Valuables and belongings sent with patient.   discharge summary, discharge instructions and follow up appointments reviewed with patient and wife.  Patient and wife verbalized understanding   no

## 2022-03-29 ENCOUNTER — APPOINTMENT (OUTPATIENT)
Dept: HEMATOLOGY ONCOLOGY | Facility: CLINIC | Age: 77
End: 2022-03-29
Payer: MEDICARE

## 2022-03-29 VITALS
HEIGHT: 62 IN | OXYGEN SATURATION: 98 % | RESPIRATION RATE: 12 BRPM | DIASTOLIC BLOOD PRESSURE: 65 MMHG | WEIGHT: 184 LBS | HEART RATE: 73 BPM | TEMPERATURE: 97.1 F | SYSTOLIC BLOOD PRESSURE: 141 MMHG | BODY MASS INDEX: 33.86 KG/M2

## 2022-03-29 PROCEDURE — 99214 OFFICE O/P EST MOD 30 MIN: CPT

## 2022-03-29 NOTE — HISTORY OF PRESENT ILLNESS
[de-identified] : he patient is a 75 year old white female referred by Dr. Darnell Hassan for evaluation of thrombophilia. \par The patient was hospitalized this past November 2nd for inability to breath following a period of 1-1.5 weeks of difficulty breathing. The patient was diagnosed with multiple pulmonary emboli with apparently a negative duplex-Doppler study. She spent about 4 days in the hospital, was diagnosed with the PE and was started on anticoagulation with heparin drip then Lovenox, eventually discharged on Eliquis. \par She is tolerating the Eliquis well. No bruising or bleeding. The SOB is now 90% better. She may still be having some difficulty climbing steps. \par The patient is denying any weight loss. She was recently diagnosed with autoimmune pancreatitis and has been on steroids since August. She is now being referred to a rheumatologist for further evaluation. No fever or night sweats. Appetite is good. No problems with urine. No new problems with ileostomy. \par She is up to date with her mammograms.\par \par The patient was diagnosed as having being infected with Covid-19 but did not require hospitalization. Her  was affected by the same virus, hospitalized and passed away from to the infection.\par  \par  [de-identified] : 06/28/2021 Patient returns for follow up and for second opinion for VTE diagnosed in October 2020 . History as outlined by Dr Jerome . In summary , she has history of ulcerative colitis s/p total colectomy and ileostomy 20 years ago , currently inactive , chronic autoimmune pancreatitis , on steroids for few years with frequent flare ups . In October 2020 ( 6 months after she contracted Covid 19 )  she developed an episode of bilateral PEs with right heart strain and  negative venous doppler . She has been on eliquis without recurrence , recent V/Q scan , PFTs and venous doppler are all normal . She suffers from severe left knee arthritis and is planning TKA soon . Of note she gained some weight due to decreased activity . She is currently on prednisone 15 mg and has been on 30 mg during acute flare up of pancreatitis. recent rheumatologic evaluation was unrevealing . \par \par 09/02/2021 Patient returns for abnormal blood work , elevated LDH . since her last visit she was diagnosed with right nephro ureterolithiasis , she has mild hypercalcemia , low vitamin D level , elevated wbc and glucose from steroids ( for auto-immune pancreatitis ) \par \par 3/29/22- Patient is here for follow up for management of h/o PE. She is on eliquis twice daily. SHe also has autoimmune pancreatitis and is on chronic prednisone therapy. She was diagnosed with new diabetes. She laso had knee Sx with no complications. She saw Dr Whittaker and would like to know if she can cut down eliquis to 2.5 mg

## 2022-03-29 NOTE — ASSESSMENT
[FreeTextEntry1] : 76 year old female with autoimmune pancreatitis , ulcerative colitis , inactive on long term steroid therapy ,\par Unprovoked PE with right heart strain in the background  of Covid 19 infection ( six months earlier )and with positive convalescent antibodies . No prior trauma or travel . \par Would recommend indefinite anticoagulation , \par \par # Autoimmune pancreatitis on chronic steroid therapy. \par \par PLan: Continue with Eliquis. \par She has upcoming f/u with Pulmonary.\par She can cut down Eliquis to 2.5 mg q12h. \par \par She follows with PMD and GI for other problems. \par RTC in 3 months \par \par Patient was seen and examined and discussed with Dr Quintana.

## 2022-04-04 ENCOUNTER — APPOINTMENT (OUTPATIENT)
Age: 77
End: 2022-04-04
Payer: MEDICARE

## 2022-04-04 VITALS
HEIGHT: 62 IN | HEART RATE: 87 BPM | OXYGEN SATURATION: 99 % | SYSTOLIC BLOOD PRESSURE: 144 MMHG | WEIGHT: 192 LBS | DIASTOLIC BLOOD PRESSURE: 80 MMHG | RESPIRATION RATE: 14 BRPM | BODY MASS INDEX: 35.33 KG/M2

## 2022-04-04 PROCEDURE — 99214 OFFICE O/P EST MOD 30 MIN: CPT

## 2022-04-04 RX ORDER — APIXABAN 5 MG/1
5 TABLET, FILM COATED ORAL
Qty: 3 | Refills: 3 | Status: DISCONTINUED | COMMUNITY
Start: 2020-11-06 | End: 2022-04-04

## 2022-04-09 ENCOUNTER — EMERGENCY (EMERGENCY)
Facility: HOSPITAL | Age: 77
LOS: 0 days | Discharge: HOME | End: 2022-04-09
Attending: EMERGENCY MEDICINE | Admitting: EMERGENCY MEDICINE
Payer: MEDICARE

## 2022-04-09 VITALS
HEART RATE: 99 BPM | RESPIRATION RATE: 18 BRPM | OXYGEN SATURATION: 96 % | SYSTOLIC BLOOD PRESSURE: 153 MMHG | TEMPERATURE: 98 F | DIASTOLIC BLOOD PRESSURE: 84 MMHG

## 2022-04-09 VITALS
HEART RATE: 95 BPM | OXYGEN SATURATION: 96 % | TEMPERATURE: 96 F | RESPIRATION RATE: 18 BRPM | DIASTOLIC BLOOD PRESSURE: 89 MMHG | SYSTOLIC BLOOD PRESSURE: 186 MMHG

## 2022-04-09 DIAGNOSIS — N39.0 URINARY TRACT INFECTION, SITE NOT SPECIFIED: ICD-10-CM

## 2022-04-09 DIAGNOSIS — Z79.01 LONG TERM (CURRENT) USE OF ANTICOAGULANTS: ICD-10-CM

## 2022-04-09 DIAGNOSIS — Z88.2 ALLERGY STATUS TO SULFONAMIDES: ICD-10-CM

## 2022-04-09 DIAGNOSIS — Z90.49 ACQUIRED ABSENCE OF OTHER SPECIFIED PARTS OF DIGESTIVE TRACT: Chronic | ICD-10-CM

## 2022-04-09 DIAGNOSIS — R10.9 UNSPECIFIED ABDOMINAL PAIN: ICD-10-CM

## 2022-04-09 DIAGNOSIS — K21.9 GASTRO-ESOPHAGEAL REFLUX DISEASE WITHOUT ESOPHAGITIS: ICD-10-CM

## 2022-04-09 DIAGNOSIS — K86.1 OTHER CHRONIC PANCREATITIS: ICD-10-CM

## 2022-04-09 DIAGNOSIS — R11.0 NAUSEA: ICD-10-CM

## 2022-04-09 DIAGNOSIS — Z86.718 PERSONAL HISTORY OF OTHER VENOUS THROMBOSIS AND EMBOLISM: ICD-10-CM

## 2022-04-09 DIAGNOSIS — Z90.710 ACQUIRED ABSENCE OF BOTH CERVIX AND UTERUS: Chronic | ICD-10-CM

## 2022-04-09 DIAGNOSIS — Z93.2 ILEOSTOMY STATUS: Chronic | ICD-10-CM

## 2022-04-09 DIAGNOSIS — Z20.822 CONTACT WITH AND (SUSPECTED) EXPOSURE TO COVID-19: ICD-10-CM

## 2022-04-09 DIAGNOSIS — Z88.1 ALLERGY STATUS TO OTHER ANTIBIOTIC AGENTS STATUS: ICD-10-CM

## 2022-04-09 LAB
ALBUMIN SERPL ELPH-MCNC: 4.1 G/DL — SIGNIFICANT CHANGE UP (ref 3.5–5.2)
ALP SERPL-CCNC: 64 U/L — SIGNIFICANT CHANGE UP (ref 30–115)
ALT FLD-CCNC: 26 U/L — SIGNIFICANT CHANGE UP (ref 0–41)
ANION GAP SERPL CALC-SCNC: 14 MMOL/L — SIGNIFICANT CHANGE UP (ref 7–14)
APPEARANCE UR: CLEAR — SIGNIFICANT CHANGE UP
AST SERPL-CCNC: 35 U/L — SIGNIFICANT CHANGE UP (ref 0–41)
BACTERIA # UR AUTO: ABNORMAL
BASOPHILS # BLD AUTO: 0.1 K/UL — SIGNIFICANT CHANGE UP (ref 0–0.2)
BASOPHILS NFR BLD AUTO: 0.6 % — SIGNIFICANT CHANGE UP (ref 0–1)
BILIRUB DIRECT SERPL-MCNC: <0.2 MG/DL — SIGNIFICANT CHANGE UP (ref 0–0.3)
BILIRUB INDIRECT FLD-MCNC: >0 MG/DL — LOW (ref 0.2–1.2)
BILIRUB SERPL-MCNC: 0.2 MG/DL — SIGNIFICANT CHANGE UP (ref 0.2–1.2)
BILIRUB UR-MCNC: NEGATIVE — SIGNIFICANT CHANGE UP
BUN SERPL-MCNC: 20 MG/DL — SIGNIFICANT CHANGE UP (ref 10–20)
CALCIUM SERPL-MCNC: 9.6 MG/DL — SIGNIFICANT CHANGE UP (ref 8.5–10.1)
CHLORIDE SERPL-SCNC: 106 MMOL/L — SIGNIFICANT CHANGE UP (ref 98–110)
CO2 SERPL-SCNC: 21 MMOL/L — SIGNIFICANT CHANGE UP (ref 17–32)
COD CRY URNS QL: NEGATIVE — SIGNIFICANT CHANGE UP
COLOR SPEC: YELLOW — SIGNIFICANT CHANGE UP
CREAT SERPL-MCNC: 1.1 MG/DL — SIGNIFICANT CHANGE UP (ref 0.7–1.5)
DIFF PNL FLD: ABNORMAL
EGFR: 53 ML/MIN/1.73M2 — LOW
EOSINOPHIL # BLD AUTO: 0.19 K/UL — SIGNIFICANT CHANGE UP (ref 0–0.7)
EOSINOPHIL NFR BLD AUTO: 1.2 % — SIGNIFICANT CHANGE UP (ref 0–8)
EPI CELLS # UR: ABNORMAL /HPF
GLUCOSE SERPL-MCNC: 103 MG/DL — HIGH (ref 70–99)
GLUCOSE UR QL: NEGATIVE MG/DL — SIGNIFICANT CHANGE UP
GRAN CASTS # UR COMP ASSIST: ABNORMAL /LPF
HCT VFR BLD CALC: 35.6 % — LOW (ref 37–47)
HGB BLD-MCNC: 10.3 G/DL — LOW (ref 12–16)
HYALINE CASTS # UR AUTO: ABNORMAL /LPF
IMM GRANULOCYTES NFR BLD AUTO: 2 % — HIGH (ref 0.1–0.3)
KETONES UR-MCNC: NEGATIVE — SIGNIFICANT CHANGE UP
LACTATE SERPL-SCNC: 2.4 MMOL/L — HIGH (ref 0.7–2)
LACTATE SERPL-SCNC: 2.7 MMOL/L — HIGH (ref 0.7–2)
LEUKOCYTE ESTERASE UR-ACNC: NEGATIVE — SIGNIFICANT CHANGE UP
LIDOCAIN IGE QN: 35 U/L — SIGNIFICANT CHANGE UP (ref 7–60)
LYMPHOCYTES # BLD AUTO: 20.9 % — SIGNIFICANT CHANGE UP (ref 20.5–51.1)
LYMPHOCYTES # BLD AUTO: 3.36 K/UL — SIGNIFICANT CHANGE UP (ref 1.2–3.4)
MCHC RBC-ENTMCNC: 23.6 PG — LOW (ref 27–31)
MCHC RBC-ENTMCNC: 28.9 G/DL — LOW (ref 32–37)
MCV RBC AUTO: 81.7 FL — SIGNIFICANT CHANGE UP (ref 81–99)
MONOCYTES # BLD AUTO: 0.95 K/UL — HIGH (ref 0.1–0.6)
MONOCYTES NFR BLD AUTO: 5.9 % — SIGNIFICANT CHANGE UP (ref 1.7–9.3)
NEUTROPHILS # BLD AUTO: 11.18 K/UL — HIGH (ref 1.4–6.5)
NEUTROPHILS NFR BLD AUTO: 69.4 % — SIGNIFICANT CHANGE UP (ref 42.2–75.2)
NITRITE UR-MCNC: NEGATIVE — SIGNIFICANT CHANGE UP
NRBC # BLD: 0 /100 WBCS — SIGNIFICANT CHANGE UP (ref 0–0)
PH UR: 5 — SIGNIFICANT CHANGE UP (ref 5–8)
PLATELET # BLD AUTO: 363 K/UL — SIGNIFICANT CHANGE UP (ref 130–400)
POTASSIUM SERPL-MCNC: 4.3 MMOL/L — SIGNIFICANT CHANGE UP (ref 3.5–5)
POTASSIUM SERPL-SCNC: 4.3 MMOL/L — SIGNIFICANT CHANGE UP (ref 3.5–5)
PROT SERPL-MCNC: 6.5 G/DL — SIGNIFICANT CHANGE UP (ref 6–8)
PROT UR-MCNC: NEGATIVE MG/DL — SIGNIFICANT CHANGE UP
RBC # BLD: 4.36 M/UL — SIGNIFICANT CHANGE UP (ref 4.2–5.4)
RBC # FLD: 16.5 % — HIGH (ref 11.5–14.5)
RBC CASTS # UR COMP ASSIST: ABNORMAL /HPF
SARS-COV-2 RNA SPEC QL NAA+PROBE: SIGNIFICANT CHANGE UP
SODIUM SERPL-SCNC: 141 MMOL/L — SIGNIFICANT CHANGE UP (ref 135–146)
SP GR SPEC: >=1.03 (ref 1.01–1.03)
TRI-PHOS CRY UR QL COMP ASSIST: NEGATIVE — SIGNIFICANT CHANGE UP
URATE CRY FLD QL MICRO: NEGATIVE — SIGNIFICANT CHANGE UP
UROBILINOGEN FLD QL: 0.2 MG/DL — SIGNIFICANT CHANGE UP
WBC # BLD: 16.1 K/UL — HIGH (ref 4.8–10.8)
WBC # FLD AUTO: 16.1 K/UL — HIGH (ref 4.8–10.8)
WBC UR QL: SIGNIFICANT CHANGE UP /HPF

## 2022-04-09 PROCEDURE — 99285 EMERGENCY DEPT VISIT HI MDM: CPT | Mod: GC

## 2022-04-09 PROCEDURE — 74177 CT ABD & PELVIS W/CONTRAST: CPT | Mod: 26,MA

## 2022-04-09 PROCEDURE — 71045 X-RAY EXAM CHEST 1 VIEW: CPT | Mod: 26

## 2022-04-09 RX ORDER — CEFPODOXIME PROXETIL 100 MG
1 TABLET ORAL
Qty: 20 | Refills: 0
Start: 2022-04-09 | End: 2022-04-18

## 2022-04-09 RX ORDER — SODIUM CHLORIDE 9 MG/ML
1000 INJECTION, SOLUTION INTRAVENOUS ONCE
Refills: 0 | Status: COMPLETED | OUTPATIENT
Start: 2022-04-09 | End: 2022-04-09

## 2022-04-09 RX ORDER — ONDANSETRON 8 MG/1
4 TABLET, FILM COATED ORAL ONCE
Refills: 0 | Status: COMPLETED | OUTPATIENT
Start: 2022-04-09 | End: 2022-04-09

## 2022-04-09 RX ORDER — MORPHINE SULFATE 50 MG/1
6 CAPSULE, EXTENDED RELEASE ORAL ONCE
Refills: 0 | Status: DISCONTINUED | OUTPATIENT
Start: 2022-04-09 | End: 2022-04-09

## 2022-04-09 RX ORDER — CEFTRIAXONE 500 MG/1
1000 INJECTION, POWDER, FOR SOLUTION INTRAMUSCULAR; INTRAVENOUS ONCE
Refills: 0 | Status: COMPLETED | OUTPATIENT
Start: 2022-04-09 | End: 2022-04-09

## 2022-04-09 RX ADMIN — MORPHINE SULFATE 6 MILLIGRAM(S): 50 CAPSULE, EXTENDED RELEASE ORAL at 06:38

## 2022-04-09 RX ADMIN — SODIUM CHLORIDE 1000 MILLILITER(S): 9 INJECTION, SOLUTION INTRAVENOUS at 07:13

## 2022-04-09 RX ADMIN — MORPHINE SULFATE 6 MILLIGRAM(S): 50 CAPSULE, EXTENDED RELEASE ORAL at 07:52

## 2022-04-09 RX ADMIN — CEFTRIAXONE 100 MILLIGRAM(S): 500 INJECTION, POWDER, FOR SOLUTION INTRAMUSCULAR; INTRAVENOUS at 09:17

## 2022-04-09 RX ADMIN — ONDANSETRON 4 MILLIGRAM(S): 8 TABLET, FILM COATED ORAL at 08:10

## 2022-04-09 RX ADMIN — MORPHINE SULFATE 6 MILLIGRAM(S): 50 CAPSULE, EXTENDED RELEASE ORAL at 10:23

## 2022-04-09 RX ADMIN — ONDANSETRON 4 MILLIGRAM(S): 8 TABLET, FILM COATED ORAL at 06:38

## 2022-04-09 NOTE — ED PROVIDER NOTE - PATIENT PORTAL LINK FT
You can access the FollowMyHealth Patient Portal offered by [facility] by registering at the following website: [email or web address]. By joining [facility]’s FollowMyHealth portal, you will also be able to view your health information using other applications (apps) compatible with our system.

## 2022-04-09 NOTE — ED PROVIDER NOTE - CLINICAL SUMMARY MEDICAL DECISION MAKING FREE TEXT BOX
pt with flank pain, no f/c, labs and studies reviewed, pain controlled, will d/c on abx (tolerates 3rd gen cephalosporin, urology f/u. Patient counseled regarding conditions which should prompt return.

## 2022-04-09 NOTE — ED PROVIDER NOTE - NS ED ROS FT
Review of Systems:  CONSTITUTIONAL: No fever   SKIN: No rash  HEMATOLOGIC: No abnormal bleeding   EYES: No eye pain, No blurred vision  ENT: No sore throat, No neck pain, No rhinorrhea   RESPIRATORY: No shortness of breath, No cough  CARDIAC: No chest pain, No palpitations  GI: +abdominal pain, +nausea, No vomiting, No diarrhea, No constipation, No bright red blood per rectum or melena. +flank pain.   : No dysuria, frequency, hematuria.   MUSCULOSKELETAL: No joint paint, No swelling, No back pain  NEUROLOGIC: No numbness, No focal weakness, No headache, No dizziness  All other systems negative, unless specified in HPI

## 2022-04-09 NOTE — ED ADULT NURSE NOTE - NSIMPLEMENTINTERV_GEN_ALL_ED
Implemented All Fall with Harm Risk Interventions:  Monitor to call system. Call bell, personal items and telephone within reach. Instruct patient to call for assistance. Room bathroom lighting operational. Non-slip footwear when patient is off stretcher. Physically safe environment: no spills, clutter or unnecessary equipment. Stretcher in lowest position, wheels locked, appropriate side rails in place. Provide visual cue, wrist band, yellow gown, etc. Monitor gait and stability. Monitor for mental status changes and reorient to person, place, and time. Review medications for side effects contributing to fall risk. Reinforce activity limits and safety measures with patient and family. Provide visual clues: red socks.

## 2022-04-09 NOTE — ED PROVIDER NOTE - PHYSICAL EXAMINATION
CONSTITUTIONAL: Well-developed; well-nourished; in no acute distress.   SKIN: warm, dry  HEAD: Normocephalic; atraumatic.  EYES: no conjunctival injection. PERRLA. EOMI.   ENT: No nasal discharge; airway clear.  NECK: Supple; non tender.  CARD: S1, S2 normal; Regular rate and rhythm.   RESP: No wheezes, rales or rhonchi.  ABD: soft nondistended +rlq colostomy bag with no blood; +LLQ TTP no CVA TTP   EXT: Normal ROM.  No lower extremity edema.   LYMPH: No acute cervical adenopathy.  NEURO: Alert, oriented, grossly unremarkable.  PSYCH: Cooperative, appropriate.

## 2022-04-09 NOTE — ED PROVIDER NOTE - ATTENDING CONTRIBUTION TO CARE
76 year old female with a pmh of ulcerative colitits s/p ileostomy, kidney stones, PE on eliquis, thrombocytopenia, htn gerd autoimmune pancreatitis presenting here c/o left side flank pain that radiates to her llq that began suddenly at 3am. Pain is 8/10 constant associated with nausea no vomiting. No fever chills cough cp sob diarrhea urinary frequency urgency burning or hematuria.  on exam  CONSTITUTIONAL: WA / WN / NAD  HEAD: NCAT  EYES: PERRL; EOMI;   ENT: Normal pharynx; mucous membranes pink/moist, no erythema.  NECK: Supple; no meningeal signs  CARD: RRR; nl S1/S2; no M/R/G.  RESP: Respiratory rate and effort are normal; breath sounds clear and equal bilaterally.  ABD: Soft,+ rlq ileostomy + ttp llq no cva   MSK/EXT: No gross deformities; full range of motion. distal pulses in tact.  SKIN: Warm and dry;   NEURO: AAOx3  PSYCH: Memory Intact, Normal Affect

## 2022-04-09 NOTE — ED PROVIDER NOTE - PROGRESS NOTE DETAILS
SR: s/o provided to dr. reeves pending ct reads and reassessment. Pt reports improvement in symptoms. Discussed results and provided copy to pt. Pt understands to follow-up with PMD. Pt understands to return to ED if symptoms return/worsen. Agreeable to discharge.

## 2022-04-09 NOTE — ED PROVIDER NOTE - NSFOLLOWUPINSTRUCTIONS_ED_ALL_ED_FT
Urinary Tract Infection    A urinary tract infection (UTI) is an infection of any part of the urinary tract, which includes the kidneys, ureters, bladder, and urethra. Risk factors include ignoring your need to urinate, wiping back to front if female, being an uncircumcised male, and having diabetes or a weak immune system. Symptoms include frequent urination, pain or burning with urination, foul smelling urine, cloudy urine, pain in the lower abdomen, blood in the urine, and fever. If you were prescribed an antibiotic medicine, take it as told by your health care provider. Do not stop taking the antibiotic even if you start to feel better.    SEEK IMMEDIATE MEDICAL CARE IF YOU HAVE ANY OF THE FOLLOWING SYMPTOMS: severe back or abdominal pain, fever, inability to keep fluids or medicine down, dizziness/lightheadedness, or a change in mental status.     Pyelonephritis    Pyelonephritis is a kidney infection. In most cases, the infection clears up with treatment and does not cause further problems. More severe infections or chronic infections can sometimes spread to the bloodstream or lead to other problems with the kidneys. Symptoms include frequent or painful urination, abdominal pain, back pain, flank pain, fever/chills, nausea, or vomiting. If you were prescribed an antibiotic medicine, take it as told by your health care provider. Do not stop taking the antibiotic even if you start to feel better.    SEEK IMMEDIATE MEDICAL CARE IF YOU HAVE ANY OF THE FOLLOWING SYMPTOMS: inability to hold down antibiotics or fluids, worsening pain, dizziness/lightheadedness, or change in mental status.

## 2022-04-09 NOTE — ED PROVIDER NOTE - CARE PROVIDER_API CALL
Luis Elkins)  Geriatric Medicine; Internal Medicine  [address] [place] [address]  Phone: [phone]  Fax: [phone]  Follow Up Time: 1-3 Days

## 2022-04-09 NOTE — ED ADULT NURSE NOTE - NS ED PATIENT SAFETY CONCERN
PORTABLE CHEST:

 

History: Mid sternal chest pain. 

 

Comparison: 5-29-12

 

FINDINGS: 

Film is shot in a lordotic fashion. The heart size is felt to be within normal limits considering the
 technique. Mediastinal structures are unremarkable. The lungs are clear of infiltrate. 

 

IMPRESSION: 

No active intrathoracic disease. 

 

POS: C
No

## 2022-04-11 NOTE — ED POST DISCHARGE NOTE - RESULT SUMMARY
CXR- R PERIHILAR OPACITY. F/U ADVISED WITH PMD FOR FURTHER EVALUATION CXR- R PERIHILAR OPACITY. F/U ADVISED WITH PMD FOR FURTHER EVALUATION. SPOKE WITH PATENT AND WILL F/U WITH PMD

## 2022-04-13 ENCOUNTER — NON-APPOINTMENT (OUTPATIENT)
Age: 77
End: 2022-04-13

## 2022-04-13 ENCOUNTER — APPOINTMENT (OUTPATIENT)
Dept: UROLOGY | Facility: CLINIC | Age: 77
End: 2022-04-13
Payer: MEDICARE

## 2022-04-13 ENCOUNTER — RESULT REVIEW (OUTPATIENT)
Age: 77
End: 2022-04-13

## 2022-04-13 DIAGNOSIS — N23 UNSPECIFIED RENAL COLIC: ICD-10-CM

## 2022-04-13 PROCEDURE — 99214 OFFICE O/P EST MOD 30 MIN: CPT

## 2022-04-13 NOTE — HISTORY OF PRESENT ILLNESS
[FreeTextEntry1] : 76-year-old with recent emergency room visit for left renal colic severe underwent CT scan which showed left hydroureteronephrosis but no stone.  History of low urinary volume and low urinary tract on metabolic stone work-up and is correcting this with diet.  History of urinary stones.  Currently she feels better with mild ache in the left flank.  No fever.  I reviewed the records from the emergency room and her white blood cell count at the time was 16,000 with elevated lactate.  She was discharged on Cefpodoxime

## 2022-04-13 NOTE — ASSESSMENT
[Hydronephrosis (591\N13.30)] : Salter-Antonio type I [FreeTextEntry1] : Likely passage of kidney stone.  We will get renal bladder ultrasound to check for resolution of hydronephrosis.  Follow-up in 6 weeks.  Urine cytology ordered

## 2022-04-13 NOTE — REVIEW OF SYSTEMS
[Fever] : no fever [Sore Throat] : no sore throat [Shortness Of Breath] : no shortness of breath [Skin Lesions] : no skin lesions [Confused] : no confusion

## 2022-04-19 ENCOUNTER — NON-APPOINTMENT (OUTPATIENT)
Age: 77
End: 2022-04-19

## 2022-04-19 DIAGNOSIS — N39.0 URINARY TRACT INFECTION, SITE NOT SPECIFIED: ICD-10-CM

## 2022-04-22 LAB
APPEARANCE: CLEAR
BILIRUBIN URINE: NEGATIVE
BLOOD URINE: NEGATIVE
COLOR: NORMAL
GLUCOSE QUALITATIVE U: NEGATIVE
KETONES URINE: NEGATIVE
LEUKOCYTE ESTERASE URINE: NEGATIVE
NITRITE URINE: NEGATIVE
PH URINE: 5.5
PROTEIN URINE: NEGATIVE
SPECIFIC GRAVITY URINE: 1.01
UROBILINOGEN URINE: NORMAL

## 2022-04-25 ENCOUNTER — NON-APPOINTMENT (OUTPATIENT)
Age: 77
End: 2022-04-25

## 2022-04-25 LAB — BACTERIA UR CULT: NORMAL

## 2022-04-27 ENCOUNTER — APPOINTMENT (OUTPATIENT)
Age: 77
End: 2022-04-27

## 2022-05-17 ENCOUNTER — OUTPATIENT (OUTPATIENT)
Dept: OUTPATIENT SERVICES | Facility: HOSPITAL | Age: 77
LOS: 1 days | Discharge: HOME | End: 2022-05-17
Payer: MEDICARE

## 2022-05-17 DIAGNOSIS — Z90.710 ACQUIRED ABSENCE OF BOTH CERVIX AND UTERUS: Chronic | ICD-10-CM

## 2022-05-17 DIAGNOSIS — Z90.49 ACQUIRED ABSENCE OF OTHER SPECIFIED PARTS OF DIGESTIVE TRACT: Chronic | ICD-10-CM

## 2022-05-17 DIAGNOSIS — N13.30 UNSPECIFIED HYDRONEPHROSIS: ICD-10-CM

## 2022-05-17 DIAGNOSIS — Z93.2 ILEOSTOMY STATUS: Chronic | ICD-10-CM

## 2022-05-17 PROCEDURE — 76770 US EXAM ABDO BACK WALL COMP: CPT | Mod: 26

## 2022-05-23 ENCOUNTER — APPOINTMENT (OUTPATIENT)
Dept: CARDIOLOGY | Facility: CLINIC | Age: 77
End: 2022-05-23
Payer: MEDICARE

## 2022-05-23 ENCOUNTER — RESULT CHARGE (OUTPATIENT)
Age: 77
End: 2022-05-23

## 2022-05-23 VITALS
WEIGHT: 194 LBS | HEART RATE: 87 BPM | HEIGHT: 62 IN | BODY MASS INDEX: 35.7 KG/M2 | SYSTOLIC BLOOD PRESSURE: 148 MMHG | DIASTOLIC BLOOD PRESSURE: 82 MMHG

## 2022-05-23 PROCEDURE — 99214 OFFICE O/P EST MOD 30 MIN: CPT

## 2022-05-23 PROCEDURE — 93000 ELECTROCARDIOGRAM COMPLETE: CPT

## 2022-05-25 ENCOUNTER — APPOINTMENT (OUTPATIENT)
Dept: UROLOGY | Facility: CLINIC | Age: 77
End: 2022-05-25
Payer: MEDICARE

## 2022-05-25 DIAGNOSIS — N13.30 UNSPECIFIED HYDRONEPHROSIS: ICD-10-CM

## 2022-05-25 DIAGNOSIS — N39.0 URINARY TRACT INFECTION, SITE NOT SPECIFIED: ICD-10-CM

## 2022-05-25 PROCEDURE — 99213 OFFICE O/P EST LOW 20 MIN: CPT

## 2022-05-25 NOTE — HISTORY OF PRESENT ILLNESS
[FreeTextEntry1] : 76-year-old with recent left renal colic and CT scans in the emergency room showing left hydroureteronephrosis but no stone.  History of low urinary volume and low urinary citrate treated with diet.  History of stones.  She has no discomfort now.  Urine culture negative.  Renal sonogram shows resolution of hydronephrosis

## 2022-05-25 NOTE — ASSESSMENT
[FreeTextEntry1] : Resolution of hydronephrosis and UTI.  Likely passed the stone.  Continuing water and lemon juice supplementation.  Patient has an ileostomy and understands need for good hydration [Hydronephrosis (591\N13.30)] : Salter-Antonio type I [Urinary Tract Infection (599.0\N39.0)] : qualitative ~C was positive

## 2022-06-04 NOTE — REASON FOR VISIT
[Follow-Up - Clinic] : a clinic follow-up of [Hypertension] : hypertension [FreeTextEntry1] : Feels well.\par \par No interval cardiac symptoms.  Breathing comfortable.\par \par Weight stable.

## 2022-06-04 NOTE — DISCUSSION/SUMMARY
[FreeTextEntry1] : Cont Amlodipine-Benazepril.\par Cont Bisoprolol.\par Cont HCTZ.\par Monitor BP.\par Cont Eliquis per pulmonary.\par Weight loss / light exercise.\par Follow-up 6-months.

## 2022-06-04 NOTE — PHYSICAL EXAM
[de-identified] : well appearing, overweight, no distress [de-identified] : reg, nL s1/s2, no m/r/g [de-identified] : CTA [de-identified] : alert, normal affect, logical conversation

## 2022-06-07 ENCOUNTER — APPOINTMENT (OUTPATIENT)
Dept: CARDIOLOGY | Facility: CLINIC | Age: 77
End: 2022-06-07

## 2022-06-27 ENCOUNTER — LABORATORY RESULT (OUTPATIENT)
Age: 77
End: 2022-06-27

## 2022-06-27 ENCOUNTER — APPOINTMENT (OUTPATIENT)
Dept: HEMATOLOGY ONCOLOGY | Facility: CLINIC | Age: 77
End: 2022-06-27
Payer: MEDICARE

## 2022-06-27 VITALS
SYSTOLIC BLOOD PRESSURE: 175 MMHG | BODY MASS INDEX: 35.7 KG/M2 | TEMPERATURE: 98.6 F | HEART RATE: 75 BPM | WEIGHT: 194 LBS | RESPIRATION RATE: 16 BRPM | DIASTOLIC BLOOD PRESSURE: 72 MMHG | HEIGHT: 62 IN

## 2022-06-27 PROCEDURE — 99213 OFFICE O/P EST LOW 20 MIN: CPT

## 2022-06-28 LAB
ALBUMIN SERPL ELPH-MCNC: 4.3 G/DL
ALP BLD-CCNC: 52 U/L
ALT SERPL-CCNC: 31 U/L
ANION GAP SERPL CALC-SCNC: 17 MMOL/L
AST SERPL-CCNC: 17 U/L
BILIRUB SERPL-MCNC: 0.2 MG/DL
BUN SERPL-MCNC: 21 MG/DL
CALCIUM SERPL-MCNC: 9.8 MG/DL
CHLORIDE SERPL-SCNC: 105 MMOL/L
CO2 SERPL-SCNC: 24 MMOL/L
CREAT SERPL-MCNC: 1.1 MG/DL
DEPRECATED KAPPA LC FREE/LAMBDA SER: 1.21 RATIO
EGFR: 52 ML/MIN/1.73M2
FERRITIN SERPL-MCNC: 25 NG/ML
GLUCOSE SERPL-MCNC: 109 MG/DL
HCT VFR BLD CALC: 37.3 %
HGB BLD-MCNC: 11 G/DL
KAPPA LC CSF-MCNC: 0.98 MG/DL
KAPPA LC SERPL-MCNC: 1.19 MG/DL
MCHC RBC-ENTMCNC: 24 PG
MCHC RBC-ENTMCNC: 29.5 G/DL
MCV RBC AUTO: 81.3 FL
PLATELET # BLD AUTO: 412 K/UL
POTASSIUM SERPL-SCNC: 4.3 MMOL/L
PROT SERPL-MCNC: 6.5 G/DL
RBC # BLD: 4.59 M/UL
RBC # FLD: 17.8 %
RETICS # AUTO: 1.7 %
RETICS AGGREG/RBC NFR: 78 K/UL
SODIUM SERPL-SCNC: 146 MMOL/L
VIT B12 SERPL-MCNC: 285 PG/ML
WBC # FLD AUTO: 15.93 K/UL

## 2022-06-28 NOTE — ASSESSMENT
[FreeTextEntry1] : 1)76 year old female with autoimmune pancreatitis , ulcerative colitis , inactive on long term steroid therapy , s/p colectomy .\par 2)Unprovoked PE with right heart strain in the background  of Covid 19 infection ( six months earlier )and with positive convalescent antibodies . No prior trauma or travel . \par on indefinite anticoagulation , \par 3)elevated LDH , \par 4)nephrolithiasis , mild hypercalcemia . \par 5) mild anemia . \par Plan : repeat anemia work up ( previously negative except for high LDH )

## 2022-06-28 NOTE — HISTORY OF PRESENT ILLNESS
[de-identified] : he patient is a 75 year old white female referred by Dr. Darnell Hassan for evaluation of thrombophilia. \par The patient was hospitalized this past November 2nd for inability to breath following a period of 1-1.5 weeks of difficulty breathing. The patient was diagnosed with multiple pulmonary emboli with apparently a negative duplex-Doppler study. She spent about 4 days in the hospital, was diagnosed with the PE and was started on anticoagulation with heparin drip then Lovenox, eventually discharged on Eliquis. \par She is tolerating the Eliquis well. No bruising or bleeding. The SOB is now 90% better. She may still be having some difficulty climbing steps. \par The patient is denying any weight loss. She was recently diagnosed with autoimmune pancreatitis and has been on steroids since August. She is now being referred to a rheumatologist for further evaluation. No fever or night sweats. Appetite is good. No problems with urine. No new problems with ileostomy. \par She is up to date with her mammograms.\par \par The patient was diagnosed as having being infected with Covid-19 but did not require hospitalization. Her  was affected by the same virus, hospitalized and passed away from to the infection.\par  \par  [de-identified] : 06/28/2021 Patient returns for follow up and for second opinion for VTE diagnosed in October 2020 . History as outlined by Dr Jerome . In summary , she has history of ulcerative colitis s/p total colectomy and ileostomy 20 years ago , currently inactive , chronic autoimmune pancreatitis , on steroids for few years with frequent flare ups . In October 2020 ( 6 months after she contracted Covid 19 )  she developed an episode of bilateral PEs with right heart strain and  negative venous doppler . She has been on eliquis without recurrence , recent V/Q scan , PFTs and venous doppler are all normal . She suffers from severe left knee arthritis and is planning TKA soon . Of note she gained some weight due to decreased activity . She is currently on prednisone 15 mg and has been on 30 mg during acute flare up of pancreatitis. recent rheumatologic evaluation was unrevealing . \par \par 09/02/2021 Patient returns for abnormal blood work , elevated LDH . since her last visit she was diagnosed with right nephro ureterolithiasis , she has mild hypercalcemia , low vitamin D level , elevated wbc and glucose from steroids ( for auto-immune pancreatitis ) \par \par 6/27/2022 Patient returns for follow up , last Hgb was 10.8 ( baseline around 12 ) , she continues on eliquis 2.5 bid and denies recurrence of DVT or PE . She continues on prednisone 20 mg , unable to taper and complicated by diabetes , currently on Januvia and FARXIGA . she lost few lbs with diet and increased activity . She denies hematochezia , s/p total colectomy . She takes vitamin D and multi vitamins . She continues to pass kidney stones every few months .

## 2022-07-04 LAB
ALBUMIN MFR SERPL ELPH: 58.6 %
ALBUMIN SERPL-MCNC: 3.9 G/DL
ALBUMIN/GLOB SERPL: 1.4 RATIO
ALPHA1 GLOB MFR SERPL ELPH: 4.3 %
ALPHA1 GLOB SERPL ELPH-MCNC: 0.3 G/DL
ALPHA2 GLOB MFR SERPL ELPH: 12 %
ALPHA2 GLOB SERPL ELPH-MCNC: 0.8 G/DL
B-GLOBULIN MFR SERPL ELPH: 14.1 %
B-GLOBULIN SERPL ELPH-MCNC: 0.9 G/DL
GAMMA GLOB FLD ELPH-MCNC: 0.7 G/DL
GAMMA GLOB MFR SERPL ELPH: 11 %
INTERPRETATION SERPL IEP-IMP: NORMAL
PROT SERPL-MCNC: 6.7 G/DL
PROT SERPL-MCNC: 6.7 G/DL

## 2022-07-07 ENCOUNTER — OUTPATIENT (OUTPATIENT)
Dept: OUTPATIENT SERVICES | Facility: HOSPITAL | Age: 77
LOS: 1 days | Discharge: HOME | End: 2022-07-07

## 2022-07-07 VITALS
RESPIRATION RATE: 18 BRPM | DIASTOLIC BLOOD PRESSURE: 76 MMHG | HEART RATE: 77 BPM | HEIGHT: 62 IN | SYSTOLIC BLOOD PRESSURE: 157 MMHG | OXYGEN SATURATION: 96 % | TEMPERATURE: 97 F | WEIGHT: 184.09 LBS

## 2022-07-07 VITALS
SYSTOLIC BLOOD PRESSURE: 138 MMHG | HEART RATE: 74 BPM | RESPIRATION RATE: 16 BRPM | DIASTOLIC BLOOD PRESSURE: 50 MMHG | OXYGEN SATURATION: 99 %

## 2022-07-07 DIAGNOSIS — Z90.710 ACQUIRED ABSENCE OF BOTH CERVIX AND UTERUS: Chronic | ICD-10-CM

## 2022-07-07 DIAGNOSIS — Z93.2 ILEOSTOMY STATUS: Chronic | ICD-10-CM

## 2022-07-07 DIAGNOSIS — Z90.49 ACQUIRED ABSENCE OF OTHER SPECIFIED PARTS OF DIGESTIVE TRACT: Chronic | ICD-10-CM

## 2022-07-07 DIAGNOSIS — Z96.649 PRESENCE OF UNSPECIFIED ARTIFICIAL HIP JOINT: Chronic | ICD-10-CM

## 2022-07-07 LAB — GLUCOSE BLDC GLUCOMTR-MCNC: 85 MG/DL — SIGNIFICANT CHANGE UP (ref 70–99)

## 2022-07-07 RX ORDER — TOBRAMYCIN AND DEXAMETHASONE 1; 3 MG/ML; MG/ML
1 SUSPENSION/ DROPS OPHTHALMIC ONCE
Refills: 0 | Status: DISCONTINUED | OUTPATIENT
Start: 2022-07-07 | End: 2022-07-07

## 2022-07-07 RX ORDER — OMEGA-3 ACID ETHYL ESTERS 1 G
2 CAPSULE ORAL
Qty: 0 | Refills: 0 | DISCHARGE

## 2022-07-07 NOTE — PACU DISCHARGE NOTE - COMMENTS
77 y o female S/P Right ECCE with IOL Implant, LSB/MAC without complications. VS /70 HR 68 RR 16 SaO2 99%. Pt tolerated procedure well.

## 2022-07-07 NOTE — ASU PATIENT PROFILE, ADULT - NSICDXPASTMEDICALHX_GEN_ALL_CORE_FT
PAST MEDICAL HISTORY:  2019 novel coronavirus disease (COVID-19) 3/2020    Anxiety     Autoimmune pancreatitis DAILY PREDNISONE    Chronic GERD     DM (diabetes mellitus)     H/O ulcerative colitis     HTN (hypertension)     Pulmonary embolism     Thrombocytopenia

## 2022-07-07 NOTE — PRE-ANESTHESIA EVALUATION ADULT - NSANTHADDINFOFT_GEN_ALL_CORE
MAC (minimal sedation) planned, backup GA  Discussed risks, including dental injury and more serious complications including cardiac and pulmonary complications and stroke.  Patient expresses understanding with regard to risks of anesthesia and wishes to proceed.

## 2022-07-07 NOTE — ASU PATIENT PROFILE, ADULT - NSICDXPASTSURGICALHX_GEN_ALL_CORE_FT
PAST SURGICAL HISTORY:  H/O total hysterectomy     History of laparoscopic cholecystectomy     S/P hip replacement left    S/P ileostomy     S/P total colectomy

## 2022-07-07 NOTE — ASU PATIENT PROFILE, ADULT - FALL HARM RISK - HARM RISK INTERVENTIONS

## 2022-07-09 DIAGNOSIS — Z79.01 LONG TERM (CURRENT) USE OF ANTICOAGULANTS: ICD-10-CM

## 2022-07-09 DIAGNOSIS — H25.091 OTHER AGE-RELATED INCIPIENT CATARACT, RIGHT EYE: ICD-10-CM

## 2022-07-09 DIAGNOSIS — Z86.711 PERSONAL HISTORY OF PULMONARY EMBOLISM: ICD-10-CM

## 2022-07-09 DIAGNOSIS — K21.9 GASTRO-ESOPHAGEAL REFLUX DISEASE WITHOUT ESOPHAGITIS: ICD-10-CM

## 2022-07-09 DIAGNOSIS — Z79.84 LONG TERM (CURRENT) USE OF ORAL HYPOGLYCEMIC DRUGS: ICD-10-CM

## 2022-07-09 DIAGNOSIS — Z88.2 ALLERGY STATUS TO SULFONAMIDES: ICD-10-CM

## 2022-07-09 DIAGNOSIS — Z88.1 ALLERGY STATUS TO OTHER ANTIBIOTIC AGENTS STATUS: ICD-10-CM

## 2022-07-09 DIAGNOSIS — I10 ESSENTIAL (PRIMARY) HYPERTENSION: ICD-10-CM

## 2022-07-09 DIAGNOSIS — Z86.16 PERSONAL HISTORY OF COVID-19: ICD-10-CM

## 2022-07-12 ENCOUNTER — APPOINTMENT (OUTPATIENT)
Dept: INFUSION THERAPY | Facility: CLINIC | Age: 77
End: 2022-07-12

## 2022-07-12 ENCOUNTER — OUTPATIENT (OUTPATIENT)
Dept: OUTPATIENT SERVICES | Facility: HOSPITAL | Age: 77
LOS: 1 days | Discharge: HOME | End: 2022-07-12

## 2022-07-12 DIAGNOSIS — Z90.49 ACQUIRED ABSENCE OF OTHER SPECIFIED PARTS OF DIGESTIVE TRACT: Chronic | ICD-10-CM

## 2022-07-12 DIAGNOSIS — K85.90 ACUTE PANCREATITIS WITHOUT NECROSIS OR INFECTION, UNSPECIFIED: ICD-10-CM

## 2022-07-12 DIAGNOSIS — Z90.710 ACQUIRED ABSENCE OF BOTH CERVIX AND UTERUS: Chronic | ICD-10-CM

## 2022-07-12 DIAGNOSIS — I26.99 OTHER PULMONARY EMBOLISM WITHOUT ACUTE COR PULMONALE: ICD-10-CM

## 2022-07-12 DIAGNOSIS — Z93.2 ILEOSTOMY STATUS: Chronic | ICD-10-CM

## 2022-07-12 DIAGNOSIS — Z96.649 PRESENCE OF UNSPECIFIED ARTIFICIAL HIP JOINT: Chronic | ICD-10-CM

## 2022-07-12 PROBLEM — E11.9 TYPE 2 DIABETES MELLITUS WITHOUT COMPLICATIONS: Chronic | Status: ACTIVE | Noted: 2022-07-07

## 2022-07-12 RX ORDER — IRON SUCROSE 20 MG/ML
200 INJECTION, SOLUTION INTRAVENOUS ONCE
Refills: 0 | Status: COMPLETED | OUTPATIENT
Start: 2022-07-12 | End: 2022-07-12

## 2022-07-12 RX ADMIN — IRON SUCROSE 200 MILLIGRAM(S): 20 INJECTION, SOLUTION INTRAVENOUS at 15:20

## 2022-07-12 RX ADMIN — IRON SUCROSE 110 MILLIGRAM(S): 20 INJECTION, SOLUTION INTRAVENOUS at 14:48

## 2022-07-14 ENCOUNTER — OUTPATIENT (OUTPATIENT)
Dept: OUTPATIENT SERVICES | Facility: HOSPITAL | Age: 77
LOS: 1 days | Discharge: HOME | End: 2022-07-14

## 2022-07-14 VITALS — HEART RATE: 72 BPM | DIASTOLIC BLOOD PRESSURE: 70 MMHG | SYSTOLIC BLOOD PRESSURE: 167 MMHG

## 2022-07-14 VITALS
RESPIRATION RATE: 17 BRPM | DIASTOLIC BLOOD PRESSURE: 73 MMHG | SYSTOLIC BLOOD PRESSURE: 147 MMHG | WEIGHT: 182.98 LBS | OXYGEN SATURATION: 96 % | HEIGHT: 62 IN | TEMPERATURE: 98 F | HEART RATE: 69 BPM

## 2022-07-14 DIAGNOSIS — Z90.710 ACQUIRED ABSENCE OF BOTH CERVIX AND UTERUS: Chronic | ICD-10-CM

## 2022-07-14 DIAGNOSIS — Z98.41 CATARACT EXTRACTION STATUS, RIGHT EYE: Chronic | ICD-10-CM

## 2022-07-14 DIAGNOSIS — Z93.2 ILEOSTOMY STATUS: Chronic | ICD-10-CM

## 2022-07-14 DIAGNOSIS — Z90.49 ACQUIRED ABSENCE OF OTHER SPECIFIED PARTS OF DIGESTIVE TRACT: Chronic | ICD-10-CM

## 2022-07-14 DIAGNOSIS — Z96.649 PRESENCE OF UNSPECIFIED ARTIFICIAL HIP JOINT: Chronic | ICD-10-CM

## 2022-07-14 LAB — GLUCOSE BLDC GLUCOMTR-MCNC: 104 MG/DL — HIGH (ref 70–99)

## 2022-07-14 RX ORDER — BISOPROLOL FUMARATE 10 MG/1
1 TABLET, FILM COATED ORAL
Qty: 0 | Refills: 0 | DISCHARGE

## 2022-07-14 RX ORDER — TOBRAMYCIN AND DEXAMETHASONE 1; 3 MG/ML; MG/ML
1 SUSPENSION/ DROPS OPHTHALMIC ONCE
Refills: 0 | Status: DISCONTINUED | OUTPATIENT
Start: 2022-07-14 | End: 2022-07-14

## 2022-07-14 NOTE — ASU PATIENT PROFILE, ADULT - NSICDXPASTSURGICALHX_GEN_ALL_CORE_FT
PAST SURGICAL HISTORY:  H/O total hysterectomy     History of laparoscopic cholecystectomy     S/P hip replacement left    S/P ileostomy     S/P total colectomy     Status post cataract extraction and insertion of intraocular lens, right

## 2022-07-14 NOTE — ASU PATIENT PROFILE, ADULT - FALL HARM RISK - HARM RISK INTERVENTIONS

## 2022-07-14 NOTE — ASU PREOP CHECKLIST - AS BP NONINV METHOD
Addended by: ALBERTO TOWNSEND on: 1/24/2022 01:08 PM     Modules accepted: Orders    
Addended by: ALBERTO TOWNSEND on: 1/24/2022 01:11 PM     Modules accepted: Orders    
Addended by: ROBERTO ARRIAZA on: 1/25/2022 11:52 AM     Modules accepted: Orders    
electronic

## 2022-07-15 NOTE — ED PROVIDER NOTE - WR ORDER STATUS 1
Subjective


Progress Note Date: 07/15/22








Yanet Vincent, is an 89-year-old female who presented to [facility] emergency room with a chief complaint of rectal bleeding for the last 

1-2 days, patient was having difficulty was constipation, she tried multiple 

over-the-counter mary jo

Ketan 

[facility] which is the nursing home 





    [id number] [facility] Rx#:[id number]   


 


Performed

## 2022-07-18 DIAGNOSIS — H26.8 OTHER SPECIFIED CATARACT: ICD-10-CM

## 2022-07-18 DIAGNOSIS — Z88.2 ALLERGY STATUS TO SULFONAMIDES: ICD-10-CM

## 2022-07-18 DIAGNOSIS — Z98.41 CATARACT EXTRACTION STATUS, RIGHT EYE: ICD-10-CM

## 2022-07-18 DIAGNOSIS — K21.9 GASTRO-ESOPHAGEAL REFLUX DISEASE WITHOUT ESOPHAGITIS: ICD-10-CM

## 2022-07-18 DIAGNOSIS — Z79.84 LONG TERM (CURRENT) USE OF ORAL HYPOGLYCEMIC DRUGS: ICD-10-CM

## 2022-07-18 DIAGNOSIS — Z86.16 PERSONAL HISTORY OF COVID-19: ICD-10-CM

## 2022-07-18 DIAGNOSIS — F41.9 ANXIETY DISORDER, UNSPECIFIED: ICD-10-CM

## 2022-07-18 DIAGNOSIS — Z90.710 ACQUIRED ABSENCE OF BOTH CERVIX AND UTERUS: ICD-10-CM

## 2022-07-18 DIAGNOSIS — Z96.649 PRESENCE OF UNSPECIFIED ARTIFICIAL HIP JOINT: ICD-10-CM

## 2022-07-18 DIAGNOSIS — Z79.01 LONG TERM (CURRENT) USE OF ANTICOAGULANTS: ICD-10-CM

## 2022-07-18 DIAGNOSIS — Z90.49 ACQUIRED ABSENCE OF OTHER SPECIFIED PARTS OF DIGESTIVE TRACT: ICD-10-CM

## 2022-07-18 DIAGNOSIS — E11.36 TYPE 2 DIABETES MELLITUS WITH DIABETIC CATARACT: ICD-10-CM

## 2022-07-18 DIAGNOSIS — Z86.711 PERSONAL HISTORY OF PULMONARY EMBOLISM: ICD-10-CM

## 2022-07-18 DIAGNOSIS — Z88.1 ALLERGY STATUS TO OTHER ANTIBIOTIC AGENTS STATUS: ICD-10-CM

## 2022-07-18 DIAGNOSIS — Z86.718 PERSONAL HISTORY OF OTHER VENOUS THROMBOSIS AND EMBOLISM: ICD-10-CM

## 2022-07-18 DIAGNOSIS — I10 ESSENTIAL (PRIMARY) HYPERTENSION: ICD-10-CM

## 2022-07-18 DIAGNOSIS — Z96.1 PRESENCE OF INTRAOCULAR LENS: ICD-10-CM

## 2022-07-19 ENCOUNTER — APPOINTMENT (OUTPATIENT)
Dept: INFUSION THERAPY | Facility: CLINIC | Age: 77
End: 2022-07-19

## 2022-07-19 RX ORDER — IRON SUCROSE 20 MG/ML
200 INJECTION, SOLUTION INTRAVENOUS ONCE
Refills: 0 | Status: COMPLETED | OUTPATIENT
Start: 2022-07-19 | End: 2022-07-19

## 2022-07-19 RX ADMIN — IRON SUCROSE 200 MILLIGRAM(S): 20 INJECTION, SOLUTION INTRAVENOUS at 16:52

## 2022-07-19 RX ADMIN — IRON SUCROSE 220 MILLIGRAM(S): 20 INJECTION, SOLUTION INTRAVENOUS at 16:21

## 2022-07-26 ENCOUNTER — APPOINTMENT (OUTPATIENT)
Dept: INFUSION THERAPY | Facility: CLINIC | Age: 77
End: 2022-07-26

## 2022-07-26 RX ORDER — IRON SUCROSE 20 MG/ML
200 INJECTION, SOLUTION INTRAVENOUS ONCE
Refills: 0 | Status: COMPLETED | OUTPATIENT
Start: 2022-07-26 | End: 2022-07-26

## 2022-07-26 RX ADMIN — IRON SUCROSE 200 MILLIGRAM(S): 20 INJECTION, SOLUTION INTRAVENOUS at 15:05

## 2022-07-26 RX ADMIN — IRON SUCROSE 220 MILLIGRAM(S): 20 INJECTION, SOLUTION INTRAVENOUS at 14:35

## 2022-08-02 ENCOUNTER — APPOINTMENT (OUTPATIENT)
Dept: ORTHOPEDIC SURGERY | Facility: CLINIC | Age: 77
End: 2022-08-02

## 2022-08-02 ENCOUNTER — APPOINTMENT (OUTPATIENT)
Dept: INFUSION THERAPY | Facility: CLINIC | Age: 77
End: 2022-08-02

## 2022-08-02 PROCEDURE — 73560 X-RAY EXAM OF KNEE 1 OR 2: CPT | Mod: 50

## 2022-08-02 PROCEDURE — 99213 OFFICE O/P EST LOW 20 MIN: CPT

## 2022-08-02 RX ORDER — IRON SUCROSE 20 MG/ML
200 INJECTION, SOLUTION INTRAVENOUS ONCE
Refills: 0 | Status: COMPLETED | OUTPATIENT
Start: 2022-08-02 | End: 2022-08-02

## 2022-08-02 RX ADMIN — IRON SUCROSE 220 MILLIGRAM(S): 20 INJECTION, SOLUTION INTRAVENOUS at 14:21

## 2022-08-02 RX ADMIN — IRON SUCROSE 200 MILLIGRAM(S): 20 INJECTION, SOLUTION INTRAVENOUS at 14:51

## 2022-08-02 NOTE — HISTORY OF PRESENT ILLNESS
[de-identified] : Patient presents for follow-up of her left knee, nearly a year out from her knee replacement, doing well.  She increased her activity earlier in the summer and noted some increased pain and tightness in the knee, since she has gotten better shape is seems to have improved.  At this point she has full range of motion in the, no instability noted, no warmth swelling or redness.\par \par Imaging:\par X-rays were taken in the office today.  \par AP and Lateral views were obtained of the knees.\par X-rays are negative for acute bone or soft tissue trauma.  \par Left knee replacement looks to be in good alignment.\par \par Follow-up in six months.

## 2022-08-05 ENCOUNTER — TRANSCRIPTION ENCOUNTER (OUTPATIENT)
Age: 77
End: 2022-08-05

## 2022-08-05 ENCOUNTER — INPATIENT (INPATIENT)
Facility: HOSPITAL | Age: 77
LOS: 0 days | Discharge: HOME | End: 2022-08-05
Attending: INTERNAL MEDICINE | Admitting: INTERNAL MEDICINE

## 2022-08-05 ENCOUNTER — EMERGENCY (EMERGENCY)
Facility: HOSPITAL | Age: 77
LOS: 0 days | Discharge: HOME | End: 2022-08-05
Admitting: INTERNAL MEDICINE

## 2022-08-05 VITALS
RESPIRATION RATE: 18 BRPM | SYSTOLIC BLOOD PRESSURE: 183 MMHG | HEART RATE: 100 BPM | WEIGHT: 179.9 LBS | HEIGHT: 62 IN | DIASTOLIC BLOOD PRESSURE: 87 MMHG | TEMPERATURE: 99 F | OXYGEN SATURATION: 97 %

## 2022-08-05 VITALS
RESPIRATION RATE: 20 BRPM | SYSTOLIC BLOOD PRESSURE: 189 MMHG | HEART RATE: 88 BPM | DIASTOLIC BLOOD PRESSURE: 88 MMHG | OXYGEN SATURATION: 99 %

## 2022-08-05 DIAGNOSIS — E11.9 TYPE 2 DIABETES MELLITUS WITHOUT COMPLICATIONS: ICD-10-CM

## 2022-08-05 DIAGNOSIS — K21.9 GASTRO-ESOPHAGEAL REFLUX DISEASE WITHOUT ESOPHAGITIS: ICD-10-CM

## 2022-08-05 DIAGNOSIS — Z90.49 ACQUIRED ABSENCE OF OTHER SPECIFIED PARTS OF DIGESTIVE TRACT: Chronic | ICD-10-CM

## 2022-08-05 DIAGNOSIS — Z87.19 PERSONAL HISTORY OF OTHER DISEASES OF THE DIGESTIVE SYSTEM: ICD-10-CM

## 2022-08-05 DIAGNOSIS — Z96.649 PRESENCE OF UNSPECIFIED ARTIFICIAL HIP JOINT: Chronic | ICD-10-CM

## 2022-08-05 DIAGNOSIS — Z86.16 PERSONAL HISTORY OF COVID-19: ICD-10-CM

## 2022-08-05 DIAGNOSIS — Z98.41 CATARACT EXTRACTION STATUS, RIGHT EYE: Chronic | ICD-10-CM

## 2022-08-05 DIAGNOSIS — Z86.711 PERSONAL HISTORY OF PULMONARY EMBOLISM: ICD-10-CM

## 2022-08-05 DIAGNOSIS — Z93.2 ILEOSTOMY STATUS: Chronic | ICD-10-CM

## 2022-08-05 DIAGNOSIS — I10 ESSENTIAL (PRIMARY) HYPERTENSION: ICD-10-CM

## 2022-08-05 DIAGNOSIS — R53.1 WEAKNESS: ICD-10-CM

## 2022-08-05 DIAGNOSIS — Z90.710 ACQUIRED ABSENCE OF BOTH CERVIX AND UTERUS: Chronic | ICD-10-CM

## 2022-08-05 DIAGNOSIS — Z86.2 PERSONAL HISTORY OF DISEASES OF THE BLOOD AND BLOOD-FORMING ORGANS AND CERTAIN DISORDERS INVOLVING THE IMMUNE MECHANISM: ICD-10-CM

## 2022-08-05 DIAGNOSIS — R35.81 NOCTURNAL POLYURIA: ICD-10-CM

## 2022-08-05 LAB
ALBUMIN SERPL ELPH-MCNC: 4.6 G/DL — SIGNIFICANT CHANGE UP (ref 3.5–5.2)
ALP SERPL-CCNC: 65 U/L — SIGNIFICANT CHANGE UP (ref 30–115)
ALT FLD-CCNC: 59 U/L — HIGH (ref 0–41)
ANION GAP SERPL CALC-SCNC: 12 MMOL/L — SIGNIFICANT CHANGE UP (ref 7–14)
APPEARANCE UR: CLEAR — SIGNIFICANT CHANGE UP
AST SERPL-CCNC: 42 U/L — HIGH (ref 0–41)
BASOPHILS # BLD AUTO: 0.08 K/UL — SIGNIFICANT CHANGE UP (ref 0–0.2)
BASOPHILS NFR BLD AUTO: 0.9 % — SIGNIFICANT CHANGE UP (ref 0–1)
BILIRUB SERPL-MCNC: 0.4 MG/DL — SIGNIFICANT CHANGE UP (ref 0.2–1.2)
BILIRUB UR-MCNC: NEGATIVE — SIGNIFICANT CHANGE UP
BUN SERPL-MCNC: 16 MG/DL — SIGNIFICANT CHANGE UP (ref 10–20)
CALCIUM SERPL-MCNC: 9.7 MG/DL — SIGNIFICANT CHANGE UP (ref 8.5–10.1)
CHLORIDE SERPL-SCNC: 101 MMOL/L — SIGNIFICANT CHANGE UP (ref 98–110)
CO2 SERPL-SCNC: 26 MMOL/L — SIGNIFICANT CHANGE UP (ref 17–32)
COLOR SPEC: YELLOW — SIGNIFICANT CHANGE UP
CREAT SERPL-MCNC: 1 MG/DL — SIGNIFICANT CHANGE UP (ref 0.7–1.5)
DIFF PNL FLD: NEGATIVE — SIGNIFICANT CHANGE UP
EGFR: 59 ML/MIN/1.73M2 — LOW
EOSINOPHIL # BLD AUTO: 0.19 K/UL — SIGNIFICANT CHANGE UP (ref 0–0.7)
EOSINOPHIL NFR BLD AUTO: 2.2 % — SIGNIFICANT CHANGE UP (ref 0–8)
EPI CELLS # UR: ABNORMAL /HPF
GLUCOSE SERPL-MCNC: 141 MG/DL — HIGH (ref 70–99)
GLUCOSE UR QL: 250 MG/DL
HCT VFR BLD CALC: 42.7 % — SIGNIFICANT CHANGE UP (ref 37–47)
HGB BLD-MCNC: 12.4 G/DL — SIGNIFICANT CHANGE UP (ref 12–16)
HPIV3 RNA SPEC QL NAA+PROBE: DETECTED
IMM GRANULOCYTES NFR BLD AUTO: 1.6 % — HIGH (ref 0.1–0.3)
KETONES UR-MCNC: NEGATIVE — SIGNIFICANT CHANGE UP
LEUKOCYTE ESTERASE UR-ACNC: NEGATIVE — SIGNIFICANT CHANGE UP
LYMPHOCYTES # BLD AUTO: 0.65 K/UL — LOW (ref 1.2–3.4)
LYMPHOCYTES # BLD AUTO: 7.5 % — LOW (ref 20.5–51.1)
MCHC RBC-ENTMCNC: 24.9 PG — LOW (ref 27–31)
MCHC RBC-ENTMCNC: 29 G/DL — LOW (ref 32–37)
MCV RBC AUTO: 85.7 FL — SIGNIFICANT CHANGE UP (ref 81–99)
MONOCYTES # BLD AUTO: 0.66 K/UL — HIGH (ref 0.1–0.6)
MONOCYTES NFR BLD AUTO: 7.7 % — SIGNIFICANT CHANGE UP (ref 1.7–9.3)
NEUTROPHILS # BLD AUTO: 6.9 K/UL — HIGH (ref 1.4–6.5)
NEUTROPHILS NFR BLD AUTO: 80.1 % — HIGH (ref 42.2–75.2)
NITRITE UR-MCNC: NEGATIVE — SIGNIFICANT CHANGE UP
NRBC # BLD: 0 /100 WBCS — SIGNIFICANT CHANGE UP (ref 0–0)
PH UR: 5.5 — SIGNIFICANT CHANGE UP (ref 5–8)
PLATELET # BLD AUTO: 298 K/UL — SIGNIFICANT CHANGE UP (ref 130–400)
POTASSIUM SERPL-MCNC: 3.8 MMOL/L — SIGNIFICANT CHANGE UP (ref 3.5–5)
POTASSIUM SERPL-SCNC: 3.8 MMOL/L — SIGNIFICANT CHANGE UP (ref 3.5–5)
PROT SERPL-MCNC: 7 G/DL — SIGNIFICANT CHANGE UP (ref 6–8)
PROT UR-MCNC: ABNORMAL MG/DL
RAPID RVP RESULT: DETECTED
RBC # BLD: 4.98 M/UL — SIGNIFICANT CHANGE UP (ref 4.2–5.4)
RBC # FLD: 21.6 % — HIGH (ref 11.5–14.5)
RBC CASTS # UR COMP ASSIST: NEGATIVE — SIGNIFICANT CHANGE UP
SARS-COV-2 RNA SPEC QL NAA+PROBE: SIGNIFICANT CHANGE UP
SODIUM SERPL-SCNC: 139 MMOL/L — SIGNIFICANT CHANGE UP (ref 135–146)
SP GR SPEC: 1.02 — SIGNIFICANT CHANGE UP (ref 1.01–1.03)
TROPONIN T SERPL-MCNC: <0.01 NG/ML — SIGNIFICANT CHANGE UP
UROBILINOGEN FLD QL: 0.2 MG/DL — SIGNIFICANT CHANGE UP
WBC # BLD: 8.62 K/UL — SIGNIFICANT CHANGE UP (ref 4.8–10.8)
WBC # FLD AUTO: 8.62 K/UL — SIGNIFICANT CHANGE UP (ref 4.8–10.8)
WBC UR QL: SIGNIFICANT CHANGE UP /HPF

## 2022-08-05 PROCEDURE — 93010 ELECTROCARDIOGRAM REPORT: CPT

## 2022-08-05 PROCEDURE — 99285 EMERGENCY DEPT VISIT HI MDM: CPT

## 2022-08-05 PROCEDURE — 99239 HOSP IP/OBS DSCHRG MGMT >30: CPT

## 2022-08-05 PROCEDURE — 71045 X-RAY EXAM CHEST 1 VIEW: CPT | Mod: 26

## 2022-08-05 RX ORDER — LIPASE/PROTEASE/AMYLASE 16-48-48K
2 CAPSULE,DELAYED RELEASE (ENTERIC COATED) ORAL
Refills: 0 | Status: DISCONTINUED | OUTPATIENT
Start: 2022-08-05 | End: 2022-08-05

## 2022-08-05 RX ORDER — ONDANSETRON 8 MG/1
4 TABLET, FILM COATED ORAL ONCE
Refills: 0 | Status: COMPLETED | OUTPATIENT
Start: 2022-08-05 | End: 2022-08-05

## 2022-08-05 RX ORDER — SODIUM CHLORIDE 9 MG/ML
1000 INJECTION, SOLUTION INTRAVENOUS ONCE
Refills: 0 | Status: COMPLETED | OUTPATIENT
Start: 2022-08-05 | End: 2022-08-05

## 2022-08-05 RX ORDER — PANTOPRAZOLE SODIUM 20 MG/1
40 TABLET, DELAYED RELEASE ORAL
Refills: 0 | Status: DISCONTINUED | OUTPATIENT
Start: 2022-08-05 | End: 2022-08-05

## 2022-08-05 RX ORDER — APIXABAN 2.5 MG/1
2.5 TABLET, FILM COATED ORAL EVERY 12 HOURS
Refills: 0 | Status: DISCONTINUED | OUTPATIENT
Start: 2022-08-05 | End: 2022-08-05

## 2022-08-05 RX ORDER — FENOFIBRATE,MICRONIZED 130 MG
145 CAPSULE ORAL DAILY
Refills: 0 | Status: DISCONTINUED | OUTPATIENT
Start: 2022-08-05 | End: 2022-08-05

## 2022-08-05 RX ORDER — DAPAGLIFLOZIN 10 MG/1
5 TABLET, FILM COATED ORAL EVERY 24 HOURS
Refills: 0 | Status: DISCONTINUED | OUTPATIENT
Start: 2022-08-05 | End: 2022-08-05

## 2022-08-05 RX ORDER — ISOPROPYL ALCOHOL, BENZOCAINE .7; .06 ML/ML; ML/ML
1 SWAB TOPICAL
Qty: 100 | Refills: 1
Start: 2022-08-05 | End: 2022-09-23

## 2022-08-05 RX ORDER — ASPIRIN/CALCIUM CARB/MAGNESIUM 324 MG
162 TABLET ORAL ONCE
Refills: 0 | Status: COMPLETED | OUTPATIENT
Start: 2022-08-05 | End: 2022-08-05

## 2022-08-05 RX ORDER — ALPRAZOLAM 0.25 MG
0.25 TABLET ORAL DAILY
Refills: 0 | Status: DISCONTINUED | OUTPATIENT
Start: 2022-08-05 | End: 2022-08-05

## 2022-08-05 RX ADMIN — SODIUM CHLORIDE 1000 MILLILITER(S): 9 INJECTION, SOLUTION INTRAVENOUS at 05:55

## 2022-08-05 RX ADMIN — ONDANSETRON 4 MILLIGRAM(S): 8 TABLET, FILM COATED ORAL at 07:02

## 2022-08-05 NOTE — H&P ADULT - NSHPLABSRESULTS_GEN_ALL_CORE
12.4   8.62  )-----------( 298      ( 05 Aug 2022 06:10 )             42.7       08-05    139  |  101  |  16  ----------------------------<  141<H>  3.8   |  26  |  1.0    Ca    9.7      05 Aug 2022 06:10    TPro  7.0  /  Alb  4.6  /  TBili  0.4  /  DBili  x   /  AST  42<H>  /  ALT  59<H>  /  AlkPhos  65  08-05              Urinalysis Basic - ( 05 Aug 2022 06:10 )    Color: Yellow / Appearance: Clear / S.025 / pH: x  Gluc: x / Ketone: Negative  / Bili: Negative / Urobili: 0.2 mg/dL   Blood: x / Protein: Trace mg/dL / Nitrite: Negative   Leuk Esterase: Negative / RBC: Negative / WBC 1-2 /HPF   Sq Epi: x / Non Sq Epi: Few /HPF / Bacteria: x      Lactate Trend      CARDIAC MARKERS ( 05 Aug 2022 06:10 )  x     / <0.01 ng/mL / x     / x     / x

## 2022-08-05 NOTE — H&P ADULT - ASSESSMENT
77 year old female with nausea, lightheadedness and weakness.     1) Nausea and Lightheadedness  - received IV Fluids and Zofran with resolution of symptoms  - case discussed with Dr. Junior - will discharge home with outpatient follow up

## 2022-08-05 NOTE — DISCHARGE NOTE NURSING/CASE MANAGEMENT/SOCIAL WORK - NSDCPEFALRISK_GEN_ALL_CORE
For information on Fall & Injury Prevention, visit: [email or web address] OR  [email or web address] OR  https://www.cdc.gov/steadi/patient.html

## 2022-08-05 NOTE — ED PROVIDER NOTE - CLINICAL SUMMARY MEDICAL DECISION MAKING FREE TEXT BOX
ed w/u reassuring, however, c/f atypical ACS presentation in diabetic female. will admit for acs r/o

## 2022-08-05 NOTE — ED ADULT NURSE NOTE - NSIMPLEMENTINTERV_GEN_ALL_ED
Implemented All Universal Safety Interventions:  [place] to call system. Call bell, personal items and telephone within reach. Instruct patient to call for assistance. Room bathroom lighting operational. Non-slip footwear when patient is off stretcher. Physically safe environment: no spills, clutter or unnecessary equipment. Stretcher in lowest position, wheels locked, appropriate side rails in place.

## 2022-08-05 NOTE — ED ADULT TRIAGE NOTE - WEIGHT IN LBS
Infusion Nursing Note:  Rachele LORRIE Martínez presents today for sandostatin.    Patient seen by provider today: No   present during visit today: Not Applicable.    Treatment Conditions:  Patient denies fevers or new medical symptoms. See systems review flow sheet.    Post Infusion Assessment:  Patient tolerated injection without incident.    Discharge Plan:   Patient discharged in stable condition accompanied by: daughter.  Departure Mode: Ambulatory.  Verbally reviewed next sandostatin injection 12/20/18.    Erum Abel RN                         179.8

## 2022-08-05 NOTE — H&P ADULT - NSHPPHYSICALEXAM_GEN_ALL_CORE
T(C): 37.1 (08-05-22 @ 05:08), Max: 37.1 (08-05-22 @ 05:08)  HR: 100 (08-05-22 @ 05:08) (100 - 100)  BP: 183/87 (08-05-22 @ 05:08) (183/87 - 183/87)  RR: 18 (08-05-22 @ 05:08) (18 - 18)  SpO2: 97% (08-05-22 @ 05:08) (97% - 97%)    PHYSICAL EXAM:  GENERAL: Sitting in Bed in no apparent distress  HEAD:  Atraumatic, Normocephalic  EYES: conjunctiva and sclera clear  NECK: Supple, No JVD, no bruits, no masses, no thyroid enlargement  ENMT: No tonsillar erythema, exudated or enlargement, moist mucous membranes  CHEST/LUNG: Clear to auscultation bilaterally; No rales, rhonchi or wheezing, no dullness to percussion  HEART: S1,S2 Regular rate and rhythm; No murmurs, rubs, or gallops  ABDOMEN: Soft, nontender, nondistended, no rebound tenderness; No palpable masses, no hernias, no organomegaly; Bowel sounds present and normoactive + Left quadrant ileostomy  EXTREMITIES:  2+ peripheral pulses bilaterally and symmetrically, no clubbing, cyanosis, or edema  LYMPH: No lymphadenopathy  PSYCH: AAOx3, normal mood and affect  NEUROLOGY: non-focal, muscle strength 5/5 all extremities, DTRs 2+ symmetrically, Normal Gait  SKIN: No rashes or lesions

## 2022-08-05 NOTE — H&P ADULT - NSICDXPASTMEDICALHX_GEN_ALL_CORE_FT
PAST MEDICAL HISTORY:  2019 novel coronavirus disease (COVID-19) 3/2020    Anxiety     Autoimmune pancreatitis DAILY PREDNISONE    Chronic GERD     DM (diabetes mellitus)     H/O ulcerative colitis     History of knee replacement procedure of left knee     HTN (hypertension)     Pulmonary embolism     Thrombocytopenia

## 2022-08-05 NOTE — H&P ADULT - HISTORY OF PRESENT ILLNESS
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  Patient states she has been experiencing and dry cough and sore throat for about a 1 week which is currently improving. She states that last night she got up to use the restroom and experienced weakness, lightheadedness, nausea, and "felt clammy". She states she is urinating about 4 times per night, however, this is normal for her since starting Farxiga. Patient denies chest pain, shortness of breath, abdominal pain, back pain, changes to vision, burning with urination, changes to bowel movements (normal ileostomy output).      In the ED, she received IV Fluids and Zofran with symptomatic relief. Patient states she is feeling better with resolution of symptoms.

## 2022-08-05 NOTE — DISCHARGE NOTE PROVIDER - ATTENDING DISCHARGE PHYSICAL EXAMINATION:
Physical Examination: CONSTITUTIONAL: Well-appearing; well-nourished; in no apparent distress.   	HEAD: Normocephalic; atraumatic.   	EYES: PERRL; EOM intact. Conjunctiva normal B/L.   	ENT: Normal pharynx with no tonsillar hypertrophy. MMM.  	NECK: Supple; non-tender; no cervical lymphadenopathy.   	CHEST: Normal chest excursion with respiration.   	CARDIOVASCULAR: Normal S1, S2; no murmurs, rubs, or gallops.   	RESPIRATORY: Normal chest excursion with respiration; breath sounds clear and equal bilaterally; no wheezes, rhonchi, or rales.  	GI/: Normal bowel sounds; non-distended; non-tender.  	BACK: No evidence of trauma or deformity. Non-tender to palpation. No CVA tenderness.   	EXT: Normal ROM in all four extremities; non-tender to palpation; distal pulses are normal. No leg edema B/L.   	SKIN: Normal for age and race; warm; dry; good turgor.  NEURO: A & O x 4; CN 2-12 intact. Grossly unremarkable.

## 2022-08-05 NOTE — DISCHARGE NOTE PROVIDER - HOSPITAL COURSE
Patient was admitted for weakness. Labs, UA, cardiac enzymes were unremarkable. Patient imaging and EKG were unremarkable. Her symptoms of nausea Patient was admitted for weakness. Labs, UA, cardiac enzymes were unremarkable. Patient imaging and EKG were unremarkable. Her symptoms of nausea and weakness resolved in the ED with zofran and fluids. She was able to ambulate in the ED. Patient states she does not have a glucometer at home and symptoms may have  been related to hypoglycemia, though her FS and glucose were wnl. She reports having a stress test outpatient with Dr. Dong. Will discharge home with outpatient follow up in a week with a glucometer.

## 2022-08-05 NOTE — ED PROVIDER NOTE - OBJECTIVE STATEMENT
77-year-old female past medical history of hypertension, hyper lipidemia, PE secondary to COVID on Eliquis, diabetes complains of weakness.  Patient states that she woke up frequently overnight to urinate and now feels weak.  Patient denies dizziness, shortness of breath, chest pain, abdominal pain, back pain, changes to vision, burning with urination, changes to bowel movements.  Patient concerned about her sugar and wanted to be checked up

## 2022-08-05 NOTE — H&P ADULT - NSICDXPASTSURGICALHX_GEN_ALL_CORE_FT
PAST SURGICAL HISTORY:  H/O total hysterectomy     History of laparoscopic cholecystectomy     S/P ileostomy     S/P total colectomy     Status post cataract extraction and insertion of intraocular lens, right

## 2022-08-05 NOTE — DISCHARGE NOTE PROVIDER - NSDCMRMEDTOKEN_GEN_ALL_CORE_FT
ALPRAZolam 0.25 mg oral tablet: 1 tab(s) orally once a day, As Needed  amlodipine-benazepril 5 mg-20 mg oral capsule: 1 cap(s) orally 2 times a day  apixaban 2.5 mg oral tablet: 1 tab(s) orally every 12 hours   bisoprolol 10 mg oral tablet: 1 tab(s) orally once a day  Creon 36,000 units oral delayed release capsule: 2 tab(s) orally 3 times a day (with meals)  Farxiga 5 mg oral tablet: 1 tab(s) orally once a day  fenofibrate 134 mg oral capsule: 1 cap(s) orally once a day  Januvia 100 mg oral tablet: 1 tab(s) orally once a day  predniSONE 10 mg oral tablet: 1 tab(s) orally once a day  Prevacid 30 mg oral delayed release capsule: 1 cap(s) orally once a day   oral

## 2022-08-05 NOTE — DISCHARGE NOTE PROVIDER - NSDCFUSCHEDAPPT_GEN_ALL_CORE_FT
King Dong  [facility]  CARDIOLOGY [address]  Scheduled Appointment: 08/29/2022    [facility]  HEMONC [address]  Scheduled Appointment: 08/30/2022    Merlin Quintana  [facility]  HEMONC [address]  Scheduled Appointment: 09/13/2022    [facility]  PULMED [address]  Scheduled Appointment: 10/17/2022    Darnell Hassan  [facility]  PULMED [address]  Scheduled Appointment: 10/17/2022

## 2022-08-05 NOTE — DISCHARGE NOTE NURSING/CASE MANAGEMENT/SOCIAL WORK - PATIENT PORTAL LINK FT
You can access the FollowMyHealth Patient Portal offered by [facility] by registering at the following website: [email or web address]. By joining [facility]’s FollowMyHealth portal, you will also be able to view your health information using other applications (apps) compatible with our system.

## 2022-08-05 NOTE — DISCHARGE NOTE PROVIDER - NSDCCPCAREPLAN_GEN_ALL_CORE_FT
PRINCIPAL DISCHARGE DIAGNOSIS  Diagnosis: Weakness  Assessment and Plan of Treatment: Your Labs, UA, cardiac enzymes were unremarkable. Your imaging and EKG were unremarkable. Your symptoms of nausea and weakness resolved in the ED with zofran and fluids. You wereable to ambulate in the ED. Patient states she does not have a glucometer at home and symptoms may have  been related to hypoglycemia. Please follow up with your PCP in 1 week. You are being dishcagred with a glucometer      SECONDARY DISCHARGE DIAGNOSES  Diagnosis: Nocturnal polyuria  Assessment and Plan of Treatment: Your urine was negative. You freqwunecy was likely due to your diabetes medication.

## 2022-08-05 NOTE — ED PROVIDER NOTE - ATTENDING CONTRIBUTION TO CARE
77-year-old female history of hypertension, diabetes, autoimmune pancreatitis, ulcerative colitis, GERD, anemia  Patient presents for evaluation of multiple complaints.  Patient states for the past week she has had some mild cough patient was given a Z-Pankaj and had a negative COVID test.  Patient has been feeling a bit better.  Patient states she woke up from sleep feeling nauseous, weak, sweaty and very lightheaded.  Chest pain, no back pain, lightheaded either.  Patient developed some urinary frequency without hematuria no dysuria.  No lower extremity edema or calf pain bilaterally. no recent stress test. pt former smoker.    vss  gen- NAD, aaox3  card-rrr  lungs-ctab, no wheezing or rhonchi  abd-sntnd, no guarding or rebound  neuro- full str/sensation, cn ii-xii grossly intact, normal coordination  LE- no calf pain/swelling b/l    r/o infection, c/f acs equivalent given weakness, lightheadedness, nausea  screen for lyte abn, anemia  labs w/ trop, cxr, ua 77-year-old female history of hypertension, diabetes, autoimmune pancreatitis, ulcerative colitis, GERD, anemia, pe after covid on eliquis   Patient presents for evaluation of multiple complaints.  Patient states for the past week she has had some mild cough patient was given a Z-Pankaj and had a negative COVID test.  Patient has been feeling a bit better.  Patient states she woke up from sleep feeling nauseous, weak, sweaty and very lightheaded.  Chest pain, no back pain, lightheaded either.  Patient developed some urinary frequency without hematuria no dysuria.  No lower extremity edema or calf pain bilaterally. no recent stress test. pt former smoker.    vss  gen- NAD, aaox3  card-rrr  lungs-ctab, no wheezing or rhonchi  abd-sntnd, no guarding or rebound  neuro- full str/sensation, cn ii-xii grossly intact, normal coordination  LE- no calf pain/swelling b/l    r/o infection, c/f acs equivalent given weakness, lightheadedness, nausea  screen for lyte abn, anemia  labs w/ trop, cxr, ua

## 2022-08-07 LAB
-  AMIKACIN: SIGNIFICANT CHANGE UP
-  AMOXICILLIN/CLAVULANIC ACID: SIGNIFICANT CHANGE UP
-  AMPICILLIN/SULBACTAM: SIGNIFICANT CHANGE UP
-  AMPICILLIN: SIGNIFICANT CHANGE UP
-  AMPICILLIN: SIGNIFICANT CHANGE UP
-  AZTREONAM: SIGNIFICANT CHANGE UP
-  CEFAZOLIN: SIGNIFICANT CHANGE UP
-  CEFEPIME: SIGNIFICANT CHANGE UP
-  CEFOXITIN: SIGNIFICANT CHANGE UP
-  CEFTRIAXONE: SIGNIFICANT CHANGE UP
-  CIPROFLOXACIN: SIGNIFICANT CHANGE UP
-  CIPROFLOXACIN: SIGNIFICANT CHANGE UP
-  ERTAPENEM: SIGNIFICANT CHANGE UP
-  GENTAMICIN: SIGNIFICANT CHANGE UP
-  LEVOFLOXACIN: SIGNIFICANT CHANGE UP
-  LEVOFLOXACIN: SIGNIFICANT CHANGE UP
-  MEROPENEM: SIGNIFICANT CHANGE UP
-  NITROFURANTOIN: SIGNIFICANT CHANGE UP
-  NITROFURANTOIN: SIGNIFICANT CHANGE UP
-  PIPERACILLIN/TAZOBACTAM: SIGNIFICANT CHANGE UP
-  TETRACYCLINE: SIGNIFICANT CHANGE UP
-  TOBRAMYCIN: SIGNIFICANT CHANGE UP
-  TRIMETHOPRIM/SULFAMETHOXAZOLE: SIGNIFICANT CHANGE UP
-  VANCOMYCIN: SIGNIFICANT CHANGE UP
CULTURE RESULTS: SIGNIFICANT CHANGE UP
METHOD TYPE: SIGNIFICANT CHANGE UP
METHOD TYPE: SIGNIFICANT CHANGE UP
ORGANISM # SPEC MICROSCOPIC CNT: SIGNIFICANT CHANGE UP
SPECIMEN SOURCE: SIGNIFICANT CHANGE UP

## 2022-08-19 DIAGNOSIS — R53.1 WEAKNESS: ICD-10-CM

## 2022-08-19 DIAGNOSIS — T38.3X5A ADVERSE EFFECT OF INSULIN AND ORAL HYPOGLYCEMIC [ANTIDIABETIC] DRUGS, INITIAL ENCOUNTER: ICD-10-CM

## 2022-08-19 DIAGNOSIS — R35.81 NOCTURNAL POLYURIA: ICD-10-CM

## 2022-08-19 DIAGNOSIS — E11.649 TYPE 2 DIABETES MELLITUS WITH HYPOGLYCEMIA WITHOUT COMA: ICD-10-CM

## 2022-08-19 DIAGNOSIS — E78.5 HYPERLIPIDEMIA, UNSPECIFIED: ICD-10-CM
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
## 2022-08-19 DIAGNOSIS — Z86.711 PERSONAL HISTORY OF PULMONARY EMBOLISM: ICD-10-CM

## 2022-08-19 DIAGNOSIS — Z86.16 PERSONAL HISTORY OF COVID-19: ICD-10-CM

## 2022-08-19 DIAGNOSIS — Z79.52 LONG TERM (CURRENT) USE OF SYSTEMIC STEROIDS: ICD-10-CM

## 2022-08-19 DIAGNOSIS — Z96.642 PRESENCE OF LEFT ARTIFICIAL HIP JOINT: ICD-10-CM

## 2022-08-19 DIAGNOSIS — Z88.2 ALLERGY STATUS TO SULFONAMIDES: ICD-10-CM

## 2022-08-19 DIAGNOSIS — Z79.84 LONG TERM (CURRENT) USE OF ORAL HYPOGLYCEMIC DRUGS: ICD-10-CM

## 2022-08-19 DIAGNOSIS — F41.9 ANXIETY DISORDER, UNSPECIFIED: ICD-10-CM

## 2022-08-19 DIAGNOSIS — Z90.49 ACQUIRED ABSENCE OF OTHER SPECIFIED PARTS OF DIGESTIVE TRACT: ICD-10-CM

## 2022-08-19 DIAGNOSIS — Z87.19 PERSONAL HISTORY OF OTHER DISEASES OF THE DIGESTIVE SYSTEM: ICD-10-CM

## 2022-08-19 DIAGNOSIS — I10 ESSENTIAL (PRIMARY) HYPERTENSION: ICD-10-CM

## 2022-08-19 DIAGNOSIS — Z88.1 ALLERGY STATUS TO OTHER ANTIBIOTIC AGENTS STATUS: ICD-10-CM

## 2022-08-25 PROBLEM — Z96.652 PRESENCE OF LEFT ARTIFICIAL KNEE JOINT: Chronic | Status: ACTIVE | Noted: 2022-08-05

## 2022-08-29 ENCOUNTER — APPOINTMENT (OUTPATIENT)
Dept: CARDIOLOGY | Facility: CLINIC | Age: 77
End: 2022-08-29

## 2022-08-29 VITALS
WEIGHT: 182 LBS | BODY MASS INDEX: 33.49 KG/M2 | HEART RATE: 66 BPM | HEIGHT: 62 IN | SYSTOLIC BLOOD PRESSURE: 155 MMHG | DIASTOLIC BLOOD PRESSURE: 68 MMHG

## 2022-08-29 DIAGNOSIS — E78.00 PURE HYPERCHOLESTEROLEMIA, UNSPECIFIED: ICD-10-CM

## 2022-08-29 PROCEDURE — 93000 ELECTROCARDIOGRAM COMPLETE: CPT

## 2022-08-29 PROCEDURE — 99214 OFFICE O/P EST MOD 30 MIN: CPT

## 2022-08-29 RX ORDER — PREDNISONE 20 MG/1
20 TABLET ORAL DAILY
Refills: 0 | Status: ACTIVE | COMMUNITY

## 2022-08-29 RX ORDER — LORATADINE 10 MG
10 TABLET,CHEWABLE ORAL
Refills: 0 | Status: ACTIVE | COMMUNITY

## 2022-08-29 RX ORDER — LANSOPRAZOLE 30 MG/1
30 CAPSULE, DELAYED RELEASE ORAL DAILY
Refills: 0 | Status: ACTIVE | COMMUNITY

## 2022-08-29 RX ORDER — SITAGLIPTIN 100 MG/1
100 TABLET, FILM COATED ORAL DAILY
Refills: 0 | Status: ACTIVE | COMMUNITY

## 2022-08-29 NOTE — REASON FOR VISIT
[Follow-Up - Clinic] : a clinic follow-up of [Hypertension] : hypertension [FreeTextEntry1] : Feels well.\par \par Fe deficient.  Getting infusions.  No overt bleeding.  Has follow-up Dr. Quintana.\par \par Exertional dyspnea improved.  Believes it was anxiety.  Did not get Stress ECHO.\par \par Takes walks / peddler.  No exertional symptoms.  Lost 12-lbs (better diet after DM Dx).\par \par Stopped HCTZ after starting Farxiga.  BP's elevated.

## 2022-08-29 NOTE — ASSESSMENT
[FreeTextEntry1] : Acute PE (11/2020):\par No RV dysfunction.\par \par BP elevated elevated.\par \par Dyspnea likely related to anemia / obesity / deconditioning.\par Improved.

## 2022-08-29 NOTE — DISCUSSION/SUMMARY
[FreeTextEntry1] : Cont Amlodipine-Benazepril.\par Cont Bisoprolol.\par Resume HCTZ.  Check labs.\par Cont Eliquis per pulmonary.\par Weight loss / light exercise.\par ECHO.\par Follow-up 6-months.

## 2022-08-29 NOTE — PHYSICAL EXAM
[de-identified] : well appearing, overweight, no distress [de-identified] : reg, nL s1/s2, no m/r/g [de-identified] : CTA [de-identified] : no edema [de-identified] : alert, normal affect, logical conversation

## 2022-08-30 ENCOUNTER — LABORATORY RESULT (OUTPATIENT)
Age: 77
End: 2022-08-30

## 2022-08-30 ENCOUNTER — APPOINTMENT (OUTPATIENT)
Dept: HEMATOLOGY ONCOLOGY | Facility: CLINIC | Age: 77
End: 2022-08-30

## 2022-08-30 DIAGNOSIS — Z00.00 ENCOUNTER FOR GENERAL ADULT MEDICAL EXAMINATION W/OUT ABNORMAL FINDINGS: ICD-10-CM

## 2022-08-30 LAB
HCT VFR BLD CALC: 44.8 %
HGB BLD-MCNC: 13.4 G/DL
IRON SATN MFR SERPL: 19 %
IRON SERPL-MCNC: 62 UG/DL
MCHC RBC-ENTMCNC: 26.5 PG
MCHC RBC-ENTMCNC: 29.9 G/DL
MCV RBC AUTO: 88.7 FL
PLATELET # BLD AUTO: 285 K/UL
PMV BLD: 8.9 FL
RBC # BLD: 5.05 M/UL
RBC # FLD: 19.7 %
TIBC SERPL-MCNC: 331 UG/DL
UIBC SERPL-MCNC: 269 UG/DL
WBC # FLD AUTO: 10.73 K/UL

## 2022-08-31 LAB — FERRITIN SERPL-MCNC: 186 NG/ML

## 2022-09-01 ENCOUNTER — APPOINTMENT (OUTPATIENT)
Dept: OPHTHALMOLOGY | Facility: CLINIC | Age: 77
End: 2022-09-01

## 2022-09-01 ENCOUNTER — NON-APPOINTMENT (OUTPATIENT)
Age: 77
End: 2022-09-01

## 2022-09-01 PROCEDURE — 92134 CPTRZ OPH DX IMG PST SGM RTA: CPT

## 2022-09-01 PROCEDURE — 92201 OPSCPY EXTND RTA DRAW UNI/BI: CPT

## 2022-09-01 PROCEDURE — 92004 COMPRE OPH EXAM NEW PT 1/>: CPT

## 2022-09-08 ENCOUNTER — EMERGENCY (EMERGENCY)
Facility: HOSPITAL | Age: 77
LOS: 0 days | Discharge: HOME | End: 2022-09-08
Attending: STUDENT IN AN ORGANIZED HEALTH CARE EDUCATION/TRAINING PROGRAM | Admitting: STUDENT IN AN ORGANIZED HEALTH CARE EDUCATION/TRAINING PROGRAM

## 2022-09-08 VITALS
HEART RATE: 102 BPM | WEIGHT: 179.9 LBS | DIASTOLIC BLOOD PRESSURE: 94 MMHG | SYSTOLIC BLOOD PRESSURE: 190 MMHG | RESPIRATION RATE: 20 BRPM | OXYGEN SATURATION: 94 % | HEIGHT: 62 IN

## 2022-09-08 VITALS
SYSTOLIC BLOOD PRESSURE: 146 MMHG | RESPIRATION RATE: 18 BRPM | OXYGEN SATURATION: 96 % | TEMPERATURE: 99 F | HEART RATE: 84 BPM | DIASTOLIC BLOOD PRESSURE: 72 MMHG

## 2022-09-08 DIAGNOSIS — Z88.2 ALLERGY STATUS TO SULFONAMIDES: ICD-10-CM

## 2022-09-08 DIAGNOSIS — Z87.891 PERSONAL HISTORY OF NICOTINE DEPENDENCE: ICD-10-CM

## 2022-09-08 DIAGNOSIS — Z79.84 LONG TERM (CURRENT) USE OF ORAL HYPOGLYCEMIC DRUGS: ICD-10-CM

## 2022-09-08 DIAGNOSIS — T36.0X5A ADVERSE EFFECT OF PENICILLINS, INITIAL ENCOUNTER: ICD-10-CM

## 2022-09-08 DIAGNOSIS — I10 ESSENTIAL (PRIMARY) HYPERTENSION: ICD-10-CM

## 2022-09-08 DIAGNOSIS — E78.5 HYPERLIPIDEMIA, UNSPECIFIED: ICD-10-CM

## 2022-09-08 DIAGNOSIS — Z88.1 ALLERGY STATUS TO OTHER ANTIBIOTIC AGENTS STATUS: ICD-10-CM

## 2022-09-08 DIAGNOSIS — R21 RASH AND OTHER NONSPECIFIC SKIN ERUPTION: ICD-10-CM

## 2022-09-08 DIAGNOSIS — E11.9 TYPE 2 DIABETES MELLITUS WITHOUT COMPLICATIONS: ICD-10-CM

## 2022-09-08 DIAGNOSIS — Z90.49 ACQUIRED ABSENCE OF OTHER SPECIFIED PARTS OF DIGESTIVE TRACT: Chronic | ICD-10-CM

## 2022-09-08 DIAGNOSIS — Z98.41 CATARACT EXTRACTION STATUS, RIGHT EYE: Chronic | ICD-10-CM

## 2022-09-08 DIAGNOSIS — Z79.01 LONG TERM (CURRENT) USE OF ANTICOAGULANTS: ICD-10-CM
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
## 2022-09-08 DIAGNOSIS — Z86.711 PERSONAL HISTORY OF PULMONARY EMBOLISM: ICD-10-CM

## 2022-09-08 DIAGNOSIS — L50.9 URTICARIA, UNSPECIFIED: ICD-10-CM

## 2022-09-08 DIAGNOSIS — Z93.2 ILEOSTOMY STATUS: Chronic | ICD-10-CM
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
## 2022-09-08 DIAGNOSIS — Z90.710 ACQUIRED ABSENCE OF BOTH CERVIX AND UTERUS: Chronic | ICD-10-CM

## 2022-09-08 PROCEDURE — 99283 EMERGENCY DEPT VISIT LOW MDM: CPT | Mod: GC

## 2022-09-08 RX ORDER — AMLODIPINE BESYLATE AND BENAZEPRIL HYDROCHLORIDE 10; 20 MG/1; MG/1
1 CAPSULE ORAL
Qty: 0 | Refills: 0 | DISCHARGE

## 2022-09-08 RX ORDER — SITAGLIPTIN 50 MG/1
1 TABLET, FILM COATED ORAL
Qty: 0 | Refills: 0 | DISCHARGE

## 2022-09-08 RX ORDER — BISOPROLOL FUMARATE 10 MG/1
1 TABLET, FILM COATED ORAL
Qty: 0 | Refills: 0 | DISCHARGE

## 2022-09-08 RX ORDER — LIPASE/PROTEASE/AMYLASE 16-48-48K
2 CAPSULE,DELAYED RELEASE (ENTERIC COATED) ORAL
Qty: 0 | Refills: 0 | DISCHARGE

## 2022-09-08 RX ORDER — LORATADINE 10 MG/1
40 TABLET ORAL ONCE
Refills: 0 | Status: COMPLETED | OUTPATIENT
Start: 2022-09-08 | End: 2022-09-08

## 2022-09-08 RX ORDER — ALPRAZOLAM 0.25 MG
1 TABLET ORAL
Qty: 0 | Refills: 0 | DISCHARGE

## 2022-09-08 RX ORDER — LANSOPRAZOLE 15 MG/1
1 CAPSULE, DELAYED RELEASE ORAL
Qty: 0 | Refills: 0 | DISCHARGE

## 2022-09-08 RX ORDER — DAPAGLIFLOZIN 10 MG/1
1 TABLET, FILM COATED ORAL
Qty: 0 | Refills: 0 | DISCHARGE

## 2022-09-08 RX ORDER — FENOFIBRATE,MICRONIZED 130 MG
1 CAPSULE ORAL
Qty: 0 | Refills: 0 | DISCHARGE

## 2022-09-08 RX ADMIN — LORATADINE 40 MILLIGRAM(S): 10 TABLET ORAL at 14:41

## 2022-09-08 RX ADMIN — Medication 20 MILLIGRAM(S): at 14:43

## 2022-09-08 NOTE — ED PROVIDER NOTE - CARE PROVIDER_API CALL
Luis Elkins)  Geriatric Medicine; Internal Medicine  [address] [place] [address]  Phone: [phone]  Fax: [phone]  Follow Up Time: 7-10 Days

## 2022-09-08 NOTE — ED ADULT NURSE NOTE - NSICDXFAMILYHX_GEN_ALL_CORE_FT
9/27/2017      Aron Aquino  [address] [place] [address]      Dear Aron Sharp, this is all quite good.     Office Visit on 09/25/2017   Component Date Value Ref Range Status   • WBC 09/26/2017 7.5  4.2 - 11.0 K/mcL Final   • RBC 09/26/2017 4.90  4.50 - 5.90 mil/mcL Final   • HGB 09/26/2017 15.2  13.0 - 17.0 g/dL Final   • HCT 09/26/2017 45.0  39.0 - 51.0 % Final   • MCV 09/26/2017 91.8  78.0 - 100.0 fl Final   • MCH 09/26/2017 31.0  26.0 - 34.0 pg Final   • MCHC 09/26/2017 33.8  32.0 - 36.5 g/dL Final   • RDW-CV 09/26/2017 13.6  11.0 - 15.0 % Final   • PLT 09/26/2017 246  140 - 450 K/mcL Final   • DIFF TYPE 09/26/2017 AUTOMATED DIFFERENTIAL   Final   • Neutrophil 09/26/2017 57  % Final   • LYMPH 09/26/2017 29  % Final   • MONO 09/26/2017 10  % Final   • EOSIN 09/26/2017 4  % Final   • BASO 09/26/2017 0  % Final   • Absolute Neutrophil 09/26/2017 4.3  1.8 - 7.7 K/mcL Final   • Absolute Lymph 09/26/2017 2.2  1.0 - 4.0 K/mcL Final   • Absolute Mono 09/26/2017 0.7  0.3 - 0.9 K/mcL Final   • Absolute Eos 09/26/2017 0.3  0.1 - 0.5 K/mcL Final   • Absolute Baso 09/26/2017 0.0  0.0 - 0.3 K/mcL Final   • Fasting Status 09/26/2017 15.25  hrs Final   • Sodium 09/26/2017 142  135 - 145 mmol/L Final   • Potassium 09/26/2017 4.1  3.4 - 5.1 mmol/L Final   • Chloride 09/26/2017 101  98 - 107 mmol/L Final   • Carbon Dioxide 09/26/2017 31  21 - 32 mmol/L Final   • Anion Gap 09/26/2017 14  10 - 20 mmol/L Final   • Glucose 09/26/2017 72  65 - 99 mg/dL Final   • BUN 09/26/2017 17  6 - 20 mg/dL Final   • Creatinine 09/26/2017 0.87  0.67 - 1.17 mg/dL Final   • GFR Estimate,  09/26/2017 >90   Final   • GFR Estimate, Non  09/26/2017 90   Final   • BUN/Creatinine Ratio 09/26/2017 19  7 - 25 Final   • CALCIUM 09/26/2017 8.9  8.4 - 10.2 mg/dL Final   • TOTAL BILIRUBIN 09/26/2017 0.8  0.2 - 1.0 mg/dL Final   • AST/SGOT 09/26/2017 12  <38 Units/L Final   • ALT/SGPT 09/26/2017 21  <79 Units/L  Final   • ALK PHOSPHATASE 09/26/2017 55  45 - 117 Units/L Final   • TOTAL PROTEIN 09/26/2017 6.6  6.4 - 8.2 g/dL Final   • Albumin 09/26/2017 3.8  3.6 - 5.1 g/dL Final   • GLOBULIN 09/26/2017 2.8  2.0 - 4.0 g/dL Final   • A/G Ratio, Serum 09/26/2017 1.4  1.0 - 2.4 Final   • FASTING STATUS 09/26/2017 15.25  hrs Final   • CHOLESTEROL 09/26/2017 202* <200 mg/dL Final   • CALCULATED LDL 09/26/2017 119  <130 mg/dL Final   • HDL 09/26/2017 51  >39 mg/dL Final   • TRIGLYCERIDE 09/26/2017 160* <150 mg/dL Final   • CALCULATED NON HDL 09/26/2017 151  mg/dL Final   • CHOL/HDL 09/26/2017 4.0  <4.5 Final   • COLOR 09/26/2017 YELLOW  YELLOW Final   • APPEARANCE 09/26/2017 CLEAR   Final   • GLUCOSE(URINE) 09/26/2017 NEGATIVE  NEGATIVE mg/dL Final   • BILIRUBIN 09/26/2017 NEGATIVE  NEGATIVE Final   • KETONES 09/26/2017 NEGATIVE  NEGATIVE mg/dL Final   • SPECIFIC GRAVITY 09/26/2017 1.020  1.005 - 1.030 Final   • BLOOD 09/26/2017 NEGATIVE  NEGATIVE Final   • pH 09/26/2017 7.5* 5.0 - 7.0 Units Final   • PROTEIN(URINE) 09/26/2017 NEGATIVE  NEGATIVE mg/dL Final   • UROBILINOGEN 09/26/2017 0.2  0.0 - 1.0 mg/dL Final   • NITRITE 09/26/2017 NEGATIVE  NEGATIVE Final   • LEUKOCYTE ESTERASE 09/26/2017 NEGATIVE  NEGATIVE Final   • Squamous EPI'S 09/26/2017 NONE SEEN  0 - 5 /hpf Final   • RBC 09/26/2017 NONE SEEN  0 - 3 /hpf Final   • WBC 09/26/2017 NONE SEEN  0 - 5 /hpf Final   • BACTERIA 09/26/2017 NONE SEEN  NONE SEEN /hpf Final   • Hyaline Casts 09/26/2017 NONE SEEN  0 - 5 /lpf Final   • SPECIMEN TYPE 09/26/2017 URINE, CLEAN CATCH/MIDSTREAM   Final             Sincerely,    Glynn Viera MD    [facility]  [address] [place] [address]  Ph: [phone]   FAMILY HISTORY:  Mother  Still living? No  FH: brain tumor, Age at diagnosis: Age Unknown

## 2022-09-08 NOTE — ED PROVIDER NOTE - PHYSICAL EXAMINATION
PHYSICAL EXAM: I have reviewed current vital signs.  GENERAL: NAD, well-nourished; well-developed.  HEAD:  Normocephalic.  EYES: Conjunctiva and sclera clear.  ENT: MMM, no erythema/exudates.  NECK: Supple, no JVD.  CHEST/LUNG: Clear to auscultation bilaterally; no wheezes, rales, or rhonchi.  HEART: Tachycardia; no murmurs, rubs, or gallops.  ABDOMEN: Soft, nontender, nondistended.  EXTREMITIES:  2+ peripheral pulses; no clubbing, cyanosis, or edema.  PSYCH: Cooperative, appropriate, normal mood and affect.  NEUROLOGY: A&O x 3. No focal neurological deficits.  SKIN: Diffuse wheals and erythema on head, neck, chest, abdomen, back, and bilateral upper extremities.

## 2022-09-08 NOTE — ED PROVIDER NOTE - CARE PROVIDERS DIRECT ADDRESSES
[email or web address]

## 2022-09-08 NOTE — ED PROVIDER NOTE - PATIENT PORTAL LINK FT
You can access the FollowMyHealth Patient Portal offered by [facility] by registering at the following website: [email or web address]. By joining [employer]’s FollowMyHealth portal, you will also be able to view your health information using other applications (apps) compatible with our system.

## 2022-09-08 NOTE — ED PROVIDER NOTE - NS ED ROS FT
Constitutional:  No fevers or chills.  Eyes:  No visual changes, eye pain, or discharge.  ENT:  No hearing changes. No sore throat.  Neck:  No neck pain or stiffness.  Cardiac:  No CP or edema.  Resp:  No cough or SOB. No hemoptysis.   GI:  No nausea, vomiting, diarrhea, or abdominal pain.  :  No dysuria, frequency, or hematuria.  MSK:  No myalgias or joint pain/swelling.  Neuro:  No headache, dizziness, or weakness.  Skin:  Diffuse itchy rash on upper body

## 2022-09-08 NOTE — ED ADULT TRIAGE NOTE - CHIEF COMPLAINT QUOTE
c/o itchy rash all over, Pt states she took Amoxicillin 500mg- 4 tabs at 1030 am in preparation for dental work and developed rash approx 45 min PTA

## 2022-09-08 NOTE — ED PROVIDER NOTE - OBJECTIVE STATEMENT
76 yo F with PMH of HTN, HLD, DM, and PE, presents to the ED complaining of diffuse itchy rash. Patient reports taking a dose of amoxicillin this morning before a routine dental cleaning appointment. Patient consequently developed hives throughout her head, chest, abdomen, back, and upper extremities. Patient denies difficulty breathing, fever, chills, headache, dizziness, chest pain, nausea, vomiting, and diarrhea.

## 2022-09-08 NOTE — ED ADULT NURSE NOTE - NSFALLRSKASSESSTYPE_ED_ALL_ED
After Visit Summary   8/10/2017    Kathryn Banks    MRN: [id number]           Patient Information     Date Of Birth          1940        Visit Information        Provider Department      8/10/2017 11:30 AM Thaddeus King MD [facility]        Today's Diagnoses     Coronary artery disease involving native coronary artery of native heart without angina pectoris    -  1    Atrial flutter, unspecified type (H)        Atrial fibrillation with rapid ventricular response (H)        Typical atrial flutter (H)        Benign essential hypertension           Follow-ups after your visit        Your next 10 appointments already scheduled     Aug 15, 2017  4:00 PM CDT   [facility]    [address] [place] [id number] [phone]           Bring a current list of meds and any records pertaining to this visit. For Physicals, please bring immunization records and any forms needing to be filled out. Please arrive 10 minutes early to complete paperwork.            Aug 17, 2017 10:00 AM CDT   Level 1 with NL INFUSION CHAIR 1   [facility] ([facility])    [address] [place] [address]  [place] [address]   [phone]            Aug 22, 2017  2:00 PM CDT   LAB with LAB FIRST FLOOR [facility] ([facility])    [address] [place] [id number] [phone]           Patient must bring picture ID. Patient should be prepared to give a urine specimen  Please do not eat 10-12 hours before your appointment if you are coming in fasting for labs on lipids, cholesterol, or glucose (sugar). Pregnant women should follow their Care Team instructions. Water with medications is okay. Do not drink coffee or other fluids. If you have concerns about taking  your medications, please ask at office or if scheduling via [employer], send a  "message by clicking on Secure Messaging, Message Your Care Team.            Aug 22, 2017  2:30 PM CDT   [facility]    [address] [place] [id number] [phone]              Future tests that were ordered for you today     Open Standing Orders        Priority Remaining Interval Expires Ordered    Hemoglobin and hematocrit Routine 99/99 weekly 8/8/2018 8/9/2017    Iron and iron binding capacity Routine 15/15 every 4 weeks 8/8/2018 8/9/2017    Ferritin Routine 15/15 every 4 weeks 8/8/2018 8/9/2017            Who to contact     If you have questions or need follow up information about today's clinic visit or your schedule please contact [facility] directly at [phone].  Normal or non-critical lab and imaging results will be communicated to you by MyChart, letter or phone within 4 business days after the clinic has received the results. If you do not hear from us within 7 days, please contact the clinic through [email or web address] or phone. If you have a critical or abnormal lab result, we will notify you by phone as soon as possible.  Submit refill requests through [employer] or call your pharmacy and they will forward the refill request to us. Please allow 3 business days for your refill to be completed.          Additional Information About Your Visit        MyChart Information     [employer] lets you send messages to your doctor, view your test results, renew your prescriptions, schedule appointments and more. To sign up, go to [email or web address] . Click on \"Log in\" on the left side of the screen, which will take you to the Welcome page. Then click on \"Sign up Now\" on the right side of the page.     You will be asked to enter the access code listed below, as well as some personal information. Please follow the directions to create your username and password.     Your access code is: " [email or web address]  Expires: 2017 11:18 AM     Your access code will  in 90 days. If you need help or a new code, please call your [facility] clinic or [phone].        Care EveryWhere ID     This is your Care EveryWhere ID. This could be used by other organizations to access your [facility] medical records  [id number]        Your Vitals Were     Pulse Height Pulse Oximetry BMI (Body Mass Index)          56 1.626 m (5' 4\") 98% 31.96 kg/m2         Blood Pressure from Last 3 Encounters:   08/10/17 140/62   17 160/85   17 154/80    Weight from Last 3 Encounters:   08/10/17 84.5 kg (186 lb 3.2 oz)   17 80.9 kg (178 lb 6.4 oz)   17 80.7 kg (178 lb)              We Performed the Following     EKG 12-LEAD CLINIC READ     Follow-Up with Cardiologist     Follow-Up with Electrophysiologist          Today's Medication Changes          These changes are accurate as of: 8/10/17 11:47 AM.  If you have any questions, ask your nurse or doctor.               Start taking these medicines.        Dose/Directions    amLODIPine 5 MG tablet   Commonly known as:  NORVASC   Used for:  Benign essential hypertension   Started by:  Thaddeus King MD        Dose:  10 mg   Take 2 tablets (10 mg) by mouth daily   Quantity:  30 tablet   Refills:  3       aspirin 81 MG tablet   Used for:  Coronary artery disease involving native coronary artery of native heart without angina pectoris   Started by:  Thaddeus King MD        Dose:  81 mg   Take 1 tablet (81 mg) by mouth daily   Quantity:  30 tablet   Refills:  3         These medicines have changed or have updated prescriptions.        Dose/Directions    gabapentin 300 MG capsule   Commonly known as:  NEURONTIN   This may have changed:  when to take this   Used for:  Diabetic polyneuropathy associated with type 2 diabetes mellitus (H)        Dose:  300 mg   Take 1 capsule (300 mg) by mouth At Bedtime   Refills:  0       metoprolol 50 MG tablet   Commonly known as:  LOPRESSOR "   This may have changed:  how much to take   Used for:  Atrial fibrillation with rapid ventricular response (H)   Changed by:  Thaddeus King MD        Dose:  25 mg   Take 0.5 tablets (25 mg) by mouth 2 times daily   Quantity:  180 tablet   Refills:  3            Where to get your medicines      These medications were sent to [facility] [address] [place] [address] - [address]  [address], [facility] [address]     Phone:  [phone]     amLODIPine 5 MG tablet    aspirin 81 MG tablet    metoprolol 50 MG tablet                Primary Care Provider Office Phone # Fax #    Dheeraj Jj [phone]       [facility] [address] [place] [address]        Equal Access to Services     CAROLINE BLAND : Hadii aad ku hadasho Soomaali, waaxda luqadaha, qaybta kaalmada adeegyada, waxay radhain haytimmyn beverly santizo . So [facility] [phone].    ATENCIÓN: Si habla español, tiene a medrano disposición servicios gratuitos de asistencia lingüística. [facility] [phone].    We comply with applicable federal civil rights laws and [place] laws. We do not discriminate on the basis of race, color, national origin, age, disability sex, sexual orientation or gender identity.            Thank you!     Thank you for choosing [facility]  for your care. Our goal is always to provide you with excellent care. Hearing back from our patients is one way we can continue to improve our services. Please take a few minutes to complete the written survey that you may receive in the mail after your visit with us. Thank you!             Your Updated Medication List - Protect others around you: Learn how to safely use, store and throw away your medicines at www.disposemymeds.org.          This list is accurate as of: 8/10/17 11:47 AM.  Always use your most recent med list.                   Brand Name Dispense Instructions for use Diagnosis    acetaminophen 325 MG tablet    TYLENOL    100 tablet     Take 2 tablets (650 mg) by mouth every 4 hours as needed for mild pain    Acute on chronic renal failure (H)       amLODIPine 5 MG tablet    NORVASC    30 tablet    Take 2 tablets (10 mg) by mouth daily    Benign essential hypertension       aspirin 81 MG tablet     30 tablet    Take 1 tablet (81 mg) by mouth daily    Coronary artery disease involving native coronary artery of native heart without angina pectoris       calcium acetate 667 MG Caps capsule    PHOSLO    180 capsule    Take 2 capsules (1,334 mg) by mouth 3 times daily (with meals)    Stage 4 chronic kidney disease (H)       calcium carbonate-vitamin D 500-400 MG-UNIT Tabs per tablet      Take 1 tablet by mouth daily    Closed intertrochanteric fracture of left hip, initial encounter       cyanocobalamin 1000 MCG/ML injection    VITAMIN B12    1 mL    Inject 1 mL (1,000 mcg) into the muscle every 30 days    Vitamin B12 deficiency (non anemic)       ferrous sulfate 325 (65 FE) MG tablet    IRON    100 tablet    Take 1 tablet (325 mg) by mouth daily (with breakfast)    Iron deficiency anemia secondary to inadequate dietary iron intake       furosemide 40 MG tablet    LASIX    60 tablet    Take 1 tablet (40 mg) by mouth daily    Chronic diastolic heart failure (H)       gabapentin 300 MG capsule    NEURONTIN     Take 1 capsule (300 mg) by mouth At Bedtime    Diabetic polyneuropathy associated with type 2 diabetes mellitus (H)       metoprolol 50 MG tablet    LOPRESSOR    180 tablet    Take 0.5 tablets (25 mg) by mouth 2 times daily    Atrial fibrillation with rapid ventricular response (H)       nitroGLYcerin 0.4 MG sublingual tablet    NITROSTAT    25 tablet    Place 1 tablet (0.4 mg) under the tongue every 5 minutes as needed for chest pain    Hx of non-ST elevation myocardial infarction (NSTEMI), Atherosclerosis of native coronary artery of native heart without angina pectoris, Postsurgical aortocoronary bypass status       order for DME     1 Device     Equipment being ordered: cpap machine, mask, humidifier, tubing and filters    ANABEL (obstructive sleep apnea)       pramipexole 0.5 MG tablet    MIRAPEX     Take 3 tablets (1.5 mg) by mouth every evening    Restless legs syndrome (RLS)       pravastatin 40 MG tablet    PRAVACHOL    90 tablet    Take 1 tablet (40 mg) by mouth daily    Hx of non-ST elevation myocardial infarction (NSTEMI), Atherosclerosis of native coronary artery of native heart without angina pectoris, Type 2 diabetes mellitus with other circulatory complications (H)       sodium bicarbonate 650 MG tablet     90 tablet    Take 1 tablet (650 mg) by mouth 3 times daily    Stage 4 chronic kidney disease (H)          Initial (On Arrival)

## 2022-09-08 NOTE — ED PROVIDER NOTE - NSFOLLOWUPINSTRUCTIONS_ED_ALL_ED_FT
Take medications as prescribed; take an extra 20mg Prednisone on top of your daily dose of 20mg Prednisone for 4 days.      Urticaria    WHAT YOU NEED TO KNOW:    Urticaria is also called hives. Hives can change size and shape, and appear anywhere on your skin. They can be mild or severe and last from a few minutes to a few days. Hives may be a sign of a severe allergic reaction called anaphylaxis that needs immediate treatment. Urticaria that lasts longer than 6 weeks may be a chronic condition that needs long-term treatment.          DISCHARGE INSTRUCTIONS:    Call 911 for signs or symptoms of anaphylaxis, such as trouble breathing, swelling in your mouth or throat, or wheezing. You may also have itching, a rash, or feel like you are going to faint.    Return to the emergency department if:     Your heart is beating faster than it normally does.      You have cramping or severe pain in your abdomen.    Contact your healthcare provider if:     You have a fever.      Your skin still itches 24 hours after you take your medicine.      You still have hives after 7 days.      Your joints are painful and swollen.      You have questions or concerns about your condition or care.    Medicines:     Epinephrine is used to treat severe allergic reactions such as anaphylaxis.      Antihistamines decrease mild symptoms such as itching or a rash.      Steroids decrease redness, pain, and swelling.      Take your medicine as directed. Contact your healthcare provider if you think your medicine is not helping or if you have side effects. Tell him of her if you are allergic to any medicine. Keep a list of the medicines, vitamins, and herbs you take. Include the amounts, and when and why you take them. Bring the list or the pill bottles to follow-up visits. Carry your medicine list with you in case of an emergency.    Steps to take for signs or symptoms of anaphylaxis:     Immediately give 1 shot of epinephrine only into the outer thigh muscle.       Leave the shot in place as directed. Your healthcare provider may recommend you leave it in place for up to 10 seconds before you remove it. This helps make sure all of the epinephrine is delivered.       Call 911 and go to the emergency department, even if the shot improved symptoms. Do not drive yourself. Bring the used epinephrine shot with you.     Safety precautions to take if you are at risk for anaphylaxis:     Keep 2 shots of epinephrine with you at all times. You may need a second shot, because epinephrine only works for about 20 minutes and symptoms may return. Your healthcare provider can show you and family members how to give the shot. Check the expiration date every month and replace it before it expires.      Create an action plan. Your healthcare provider can help you create a written plan that explains the allergy and an emergency plan to treat a reaction. The plan explains when to give a second epinephrine shot if symptoms return or do not improve after the first. Give copies of the action plan and emergency instructions to family members, work and school staff, and  providers. Show them how to give a shot of epinephrine.      Be careful when you exercise. If you have had exercise-induced anaphylaxis, do not exercise right after you eat. Stop exercising right away if you start to develop any signs or symptoms of anaphylaxis. You may first feel tired, warm, or have itchy skin. Hives, swelling, and severe breathing problems may develop if you continue to exercise.      Carry medical alert identification. Wear medical alert jewelry or carry a card that explains the allergy. Ask your healthcare provider where to get these items. Medical Alert Jewelry           Keep a record of triggers and symptoms. Record everything you eat, drink, or apply to your skin for 3 weeks. Include stressful events and what you were doing right before your hives started. Bring the record with you to follow-up visits with your healthcare provider.    Manage urticaria:     Cool your skin. This may help decrease itching. Apply a cool pack to your hives. Dip a hand towel in cool water, wring it out, and place it on your hives. You may also soak your skin in a cool oatmeal bath.      Do not rub your hives. This can irritate your skin and cause more hives.      Wear loose clothing. Tight clothes may irritate your skin and cause more hives.      Manage stress. Stress may trigger hives, or make them worse. Learn new ways to relax, such as deep breathing.     Follow up with your healthcare provider as directed: Write down your questions so you remember to ask them during your visits.

## 2022-09-08 NOTE — ED PROVIDER NOTE - CLINICAL SUMMARY MEDICAL DECISION MAKING FREE TEXT BOX
77-year-old female with past medical history hypertension hyperlipidemia, diabetes who presents to the ER for diffuse itchy rash that developed after taking a dose of amoxicillin.  Denies shortness of breath, wheezing, vomiting, palpitations, chest pain.  Vitals noted, triage reviewed.  Agree with exam as documented above, consistent with hives.  Patient was given famotidine 40 mg and prednisone.  She is given return precautions, follow-up instructions, and she verbalizes understanding.

## 2022-09-13 ENCOUNTER — APPOINTMENT (OUTPATIENT)
Dept: HEMATOLOGY ONCOLOGY | Facility: CLINIC | Age: 77
End: 2022-09-13

## 2022-09-13 VITALS
DIASTOLIC BLOOD PRESSURE: 67 MMHG | WEIGHT: 179 LBS | OXYGEN SATURATION: 95 % | SYSTOLIC BLOOD PRESSURE: 147 MMHG | TEMPERATURE: 97.4 F | HEART RATE: 86 BPM | HEIGHT: 62.5 IN | BODY MASS INDEX: 32.12 KG/M2

## 2022-09-13 PROCEDURE — 99213 OFFICE O/P EST LOW 20 MIN: CPT

## 2022-09-13 NOTE — HISTORY OF PRESENT ILLNESS
[de-identified] : he patient is a 75 year old white female referred by Dr. Darnell Hassan for evaluation of thrombophilia. \par The patient was hospitalized this past November 2nd for inability to breath following a period of 1-1.5 weeks of difficulty breathing. The patient was diagnosed with multiple pulmonary emboli with apparently a negative duplex-Doppler study. She spent about 4 days in the hospital, was diagnosed with the PE and was started on anticoagulation with heparin drip then Lovenox, eventually discharged on Eliquis. \par She is tolerating the Eliquis well. No bruising or bleeding. The SOB is now 90% better. She may still be having some difficulty climbing steps. \par The patient is denying any weight loss. She was recently diagnosed with autoimmune pancreatitis and has been on steroids since August. She is now being referred to a rheumatologist for further evaluation. No fever or night sweats. Appetite is good. No problems with urine. No new problems with ileostomy. \par She is up to date with her mammograms.\par \par The patient was diagnosed as having being infected with Covid-19 but did not require hospitalization. Her  was affected by the same virus, hospitalized and passed away from to the infection.\par  \par  [de-identified] : 06/28/2021 Patient returns for follow up and for second opinion for VTE diagnosed in October 2020 . History as outlined by Dr Jerome . In summary , she has history of ulcerative colitis s/p total colectomy and ileostomy 20 years ago , currently inactive , chronic autoimmune pancreatitis , on steroids for few years with frequent flare ups . In October 2020 ( 6 months after she contracted Covid 19 )  she developed an episode of bilateral PEs with right heart strain and  negative venous doppler . She has been on eliquis without recurrence , recent V/Q scan , PFTs and venous doppler are all normal . She suffers from severe left knee arthritis and is planning TKA soon . Of note she gained some weight due to decreased activity . She is currently on prednisone 15 mg and has been on 30 mg during acute flare up of pancreatitis. recent rheumatologic evaluation was unrevealing . \par \par 09/02/2021 Patient returns for abnormal blood work , elevated LDH . since her last visit she was diagnosed with right nephro ureterolithiasis , she has mild hypercalcemia , low vitamin D level , elevated wbc and glucose from steroids ( for auto-immune pancreatitis ) \par \par 6/27/2022 Patient returns for follow up , last Hgb was 10.8 ( baseline around 12 ) , she continues on eliquis 2.5 bid and denies recurrence of DVT or PE . She continues on prednisone 20 mg , unable to taper and complicated by diabetes , currently on Januvia and FARXIGA . she lost few lbs with diet and increased activity . She denies hematochezia , s/p total colectomy . She takes vitamin D and multi vitamins . She continues to pass kidney stones every few months . \par \par 9/13/2022 Patient returns for VTE and iron deficiency , corrected with iron infusions . She feels well , denies any bleeding from ileostomy . She continues on prednisone and PPI , unable to charmaine and had no response to immuran .

## 2022-09-13 NOTE — ASSESSMENT
[FreeTextEntry1] : 1)76 year old female with autoimmune pancreatitis , ulcerative colitis , inactive on long term steroid therapy , s/p total colectomy .\par 2)Unprovoked PE with right heart strain in the background  of Covid 19 infection ( six months earlier )and with positive convalescent antibodies . No prior trauma or travel . \par on indefinite anticoagulation , \par 3)elevated LDH , \par 4)nephrolithiasis , mild hypercalcemia . \par 5) Iron deficiency anemia s/p iron infusion , normal Hgb . malabsorption on PPI , rule out GI bleeding . \par \par Plan : follow up with GI , need for EGD ? \par          cbc , ferritin in 3 months .\par          follow up in 6 months . \par

## 2022-09-20 ENCOUNTER — APPOINTMENT (OUTPATIENT)
Dept: CARDIOLOGY | Facility: CLINIC | Age: 77
End: 2022-09-20

## 2022-09-20 PROCEDURE — 93306 TTE W/DOPPLER COMPLETE: CPT

## 2022-10-10 ENCOUNTER — APPOINTMENT (OUTPATIENT)
Dept: ORTHOPEDIC SURGERY | Facility: CLINIC | Age: 77
End: 2022-10-10

## 2022-10-10 DIAGNOSIS — M70.61 TROCHANTERIC BURSITIS, RIGHT HIP: ICD-10-CM

## 2022-10-10 PROCEDURE — 20610 DRAIN/INJ JOINT/BURSA W/O US: CPT | Mod: RT

## 2022-10-10 PROCEDURE — 73522 X-RAY EXAM HIPS BI 3-4 VIEWS: CPT

## 2022-10-10 PROCEDURE — 99213 OFFICE O/P EST LOW 20 MIN: CPT | Mod: 25

## 2022-10-10 NOTE — DISCUSSION/SUMMARY
[de-identified] :   Discussed bilateral hip x-rays with patient.  Bilateral taken for comparison.  Patient has moderate osteoarthritis bilateral hips with no acute fractures, subluxations, dislocations.\par \par   Plan today's for cortisone injection.  Discussed other treatment options including physical therapy.  Possible MRI next visit  for further evaluation if pain continues.\par \par Dexamethasone and Lidocaine  \par \par Anesthesia: ethyl chloride sprayed topically. Dexamethasone 20mg/5mL 2 cc.  \par \par Lidocaine 1%: 2 cc.  \par \par   \par \par Medication was injected in the right   Greater trochanteric bursa area after verbal consent using sterile preparation and technique. The risks, benefits, and alternatives to cortisone injection were explained in full to the patient. Risks outlined include but are not limited to infection, sepsis, bleeding, scarring, skin discoloration, temporary increase in pain, syncopal episode, failure to resolve symptoms, allergic reaction, symptom recurrence, and elevation of blood sugar in diabetics. Patient understood the risks. All questions were answered. After discussion of options, patient requested an injection. Oral informed consent was obtained and sterile prep was done of the injection site. Sterile technique was utilized for the procedure including the preparation of the solutions used for the injection. Patient tolerated the procedure well. Advised to ice the injection site this evening. \par \par  follow-up in 3-4 months for re-evaluation.  Call if any questions or concerns.  Patient understands agrees with plan.  Seen under supervision of Dr. Grullon

## 2022-10-10 NOTE — HISTORY OF PRESENT ILLNESS
[de-identified] : Patient is a 77-year-old female here for re-evaluation of right hip greater trochanteric bursitis.   at last visit patient was given a cortisone injection to the right greater trochanteric bursa.  Patient states that the injection lasted about 4 months.  Patient states about a month ago the pain came back.  Denies recent injury/trauma.  Denies numbness and tingling.

## 2022-10-17 ENCOUNTER — APPOINTMENT (OUTPATIENT)
Age: 77
End: 2022-10-17

## 2022-10-17 VITALS
DIASTOLIC BLOOD PRESSURE: 80 MMHG | HEIGHT: 62 IN | WEIGHT: 182 LBS | HEART RATE: 84 BPM | RESPIRATION RATE: 14 BRPM | SYSTOLIC BLOOD PRESSURE: 124 MMHG | BODY MASS INDEX: 33.49 KG/M2 | OXYGEN SATURATION: 98 %

## 2022-10-17 PROCEDURE — 94010 BREATHING CAPACITY TEST: CPT

## 2022-10-17 PROCEDURE — 99214 OFFICE O/P EST MOD 30 MIN: CPT | Mod: 25

## 2022-10-17 PROCEDURE — 94729 DIFFUSING CAPACITY: CPT

## 2022-10-17 PROCEDURE — 94727 GAS DIL/WSHOT DETER LNG VOL: CPT

## 2022-10-18 NOTE — ASU PATIENT PROFILE, ADULT - BLOOD TRANSFUSION, PREVIOUS, PROFILE
Goal Outcome Evaluation:/78 (BP Location: Right arm)   Pulse 85   Temp 98.1  F (36.7  C) (Oral)   Resp 20   Wt 77.1 kg (170 lb)   SpO2 97%   BMI 28.29 kg/m      -diagnostic tests and consults completed and resulted :Not met   -vital signs normal or at patient baseline :Met   -tolerating oral intake to maintain hydration :Not met   -adequate pain control on oral analgesics :Met   -safe disposition plan has been identified:Not met                          yes

## 2022-12-13 ENCOUNTER — OUTPATIENT (OUTPATIENT)
Dept: OUTPATIENT SERVICES | Facility: HOSPITAL | Age: 77
LOS: 1 days | End: 2022-12-13

## 2022-12-13 ENCOUNTER — APPOINTMENT (OUTPATIENT)
Dept: HEMATOLOGY ONCOLOGY | Facility: CLINIC | Age: 77
End: 2022-12-13

## 2022-12-13 DIAGNOSIS — Z98.41 CATARACT EXTRACTION STATUS, RIGHT EYE: Chronic | ICD-10-CM

## 2022-12-13 DIAGNOSIS — Z93.2 ILEOSTOMY STATUS: Chronic | ICD-10-CM

## 2022-12-13 DIAGNOSIS — K85.90 ACUTE PANCREATITIS WITHOUT NECROSIS OR INFECTION, UNSPECIFIED: ICD-10-CM

## 2022-12-13 DIAGNOSIS — Z90.49 ACQUIRED ABSENCE OF OTHER SPECIFIED PARTS OF DIGESTIVE TRACT: Chronic | ICD-10-CM

## 2022-12-13 DIAGNOSIS — Z90.710 ACQUIRED ABSENCE OF BOTH CERVIX AND UTERUS: Chronic | ICD-10-CM

## 2022-12-13 DIAGNOSIS — I26.99 OTHER PULMONARY EMBOLISM WITHOUT ACUTE COR PULMONALE: ICD-10-CM

## 2022-12-13 LAB
BASOPHILS # BLD AUTO: 0.09 K/UL
BASOPHILS NFR BLD AUTO: 0.6 %
EOSINOPHIL # BLD AUTO: 0.2 K/UL
EOSINOPHIL NFR BLD AUTO: 1.3 %
HCT VFR BLD CALC: 42.5 %
HGB BLD-MCNC: 13.4 G/DL
IMM GRANULOCYTES NFR BLD AUTO: 1.7 %
LYMPHOCYTES # BLD AUTO: 1.7 K/UL
LYMPHOCYTES NFR BLD AUTO: 11.3 %
MAN DIFF?: NORMAL
MCHC RBC-ENTMCNC: 28.8 PG
MCHC RBC-ENTMCNC: 31.5 G/DL
MCV RBC AUTO: 91.4 FL
MONOCYTES # BLD AUTO: 0.94 K/UL
MONOCYTES NFR BLD AUTO: 6.3 %
NEUTROPHILS # BLD AUTO: 11.83 K/UL
NEUTROPHILS NFR BLD AUTO: 78.8 %
PLATELET # BLD AUTO: 267 K/UL
RBC # BLD: 4.65 M/UL
RBC # FLD: 16.3 %
WBC # FLD AUTO: 15.02 K/UL

## 2022-12-14 LAB — FERRITIN SERPL-MCNC: 66 NG/ML

## 2023-01-15 NOTE — ED PROVIDER NOTE - HIV OFFER
Previously Declined (within the last year) No - the patient is unable to be screened due to medical condition

## 2023-02-06 NOTE — OCCUPATIONAL THERAPY INITIAL EVALUATION ADULT - PLANNED THERAPY INTERVENTIONS, OT EVAL
ADL retraining/transfer training FAMILY HISTORY:  Grandparent  Still living? Unknown  Family history of heart disease, Age at diagnosis: Age Unknown

## 2023-02-27 ENCOUNTER — APPOINTMENT (OUTPATIENT)
Dept: CARDIOLOGY | Facility: CLINIC | Age: 78
End: 2023-02-27
Payer: MEDICARE

## 2023-02-27 VITALS
WEIGHT: 183 LBS | HEIGHT: 62 IN | SYSTOLIC BLOOD PRESSURE: 130 MMHG | HEART RATE: 69 BPM | TEMPERATURE: 97 F | BODY MASS INDEX: 33.68 KG/M2 | DIASTOLIC BLOOD PRESSURE: 70 MMHG

## 2023-02-27 PROCEDURE — 99214 OFFICE O/P EST MOD 30 MIN: CPT

## 2023-02-27 PROCEDURE — 93000 ELECTROCARDIOGRAM COMPLETE: CPT

## 2023-02-27 RX ORDER — ATORVASTATIN CALCIUM 10 MG/1
10 TABLET, FILM COATED ORAL EVERY OTHER DAY
Refills: 0 | Status: ACTIVE | COMMUNITY

## 2023-03-05 NOTE — ASSESSMENT
[FreeTextEntry1] : Acute PE (11/2020)\par No RV dysfunction.\par \par BP controlled\par \par Tolerating statin\par \par Dyspnea likely related to anemia / obesity / deconditioning.\par Improved.

## 2023-03-05 NOTE — DISCUSSION/SUMMARY
[FreeTextEntry1] : Cont Amlodipine-Benazepril\par Cont Bisoprolol\par Cont Lipitor\par Cont Eliquis per pulmonary.\par Weight loss / light exercise\par Follow-up 6-months

## 2023-03-05 NOTE — REASON FOR VISIT
[Hypertension] : hypertension [Follow-Up - Clinic] : a clinic follow-up of [FreeTextEntry1] : Feels well.\par \par Takes walks.  No exertional symptoms.  Weight stable.\par \par ECHO (9/2022): nL biventricular function.  No valve disease.\par \par Labs Reviewed (2/2023):\par LDL 77  HDL 60  \par H1c 6.0\par CMP unremarkable

## 2023-03-05 NOTE — PHYSICAL EXAM
[de-identified] : well appearing, overweight, no distress [de-identified] : reg, nL s1/s2, no m/r/g [de-identified] : CTA [de-identified] : alert, normal affect, logical conversation [de-identified] : no edema

## 2023-03-06 ENCOUNTER — APPOINTMENT (OUTPATIENT)
Dept: ORTHOPEDIC SURGERY | Facility: CLINIC | Age: 78
End: 2023-03-06
Payer: MEDICARE

## 2023-03-06 DIAGNOSIS — M70.61 TROCHANTERIC BURSITIS, RIGHT HIP: ICD-10-CM

## 2023-03-06 DIAGNOSIS — M70.62 TROCHANTERIC BURSITIS, RIGHT HIP: ICD-10-CM

## 2023-03-06 PROCEDURE — 20610 DRAIN/INJ JOINT/BURSA W/O US: CPT | Mod: 50

## 2023-03-06 PROCEDURE — 99213 OFFICE O/P EST LOW 20 MIN: CPT | Mod: 25

## 2023-03-06 NOTE — HISTORY OF PRESENT ILLNESS
[de-identified] : Patient is a 77-year-old female here for evaluation of bilateral hips.  Patient was given a prior cortisone injection to right hip greater trochanteric bursa area which has helped.  Patient's pain was returning and worsening so she was sent for an MRI.  MRI shows torn superior labrum full-thickness tear hamstring origin with inferior retraction.  Full-thickness trochanteric insertional gluteal tear.  Patient states her left hip feels very similar to her right.  Denies recent injury/trauma, numbness/tingling, instability.  Pain worsens with activity

## 2023-03-06 NOTE — IMAGING
[de-identified] : Bilateral hip : Tenderness to greater trochanteric bursa tenderness radiating down the IT band. Positive Quinn test. Pain with direct palpation of the gluteal tendons and trochanter insertion.  Good range of motion throughout with no pain to groin.  Good strength throughout.  Neurovascular intact.\par

## 2023-03-06 NOTE — DISCUSSION/SUMMARY
[de-identified] : Discussed with patient that she does have a full-thickness insertional gluteal tear of the right hip along the area of pain.  Nickolas treatment options detail including ice/heat, range of motion exercise, physical therapy, cortisone injection, surgical intervention.\par \par Discussed patient is having symptoms of left hip greater trochanteric bursitis.\par \par Patient agreed to cortisone injection bilateral hip\par \par Dexamethasone and Lidocaine  \par \par Anesthesia: ethyl chloride sprayed topically. Dexamethasone 20mg/5mL 2 cc.  \par \par Lidocaine 1%: 2 cc.  \par \par   \par \par Medication was injected in bilateral hip greater trochanteric bursa area after verbal consent using sterile preparation and technique. The risks, benefits, and alternatives to cortisone injection were explained in full to the patient. Risks outlined include but are not limited to infection, sepsis, bleeding, scarring, skin discoloration, temporary increase in pain, syncopal episode, failure to resolve symptoms, allergic reaction, symptom recurrence, and elevation of blood sugar in diabetics. Patient understood the risks. All questions were answered. After discussion of options, patient requested an injection. Oral informed consent was obtained and sterile prep was done of the injection site. Sterile technique was utilized for the procedure including the preparation of the solutions used for the injection. Patient tolerated the procedure well. Advised to ice the injection site this evening. \par \par Patient will follow-up with Dr. Hoang in 6 to 8 weeks for further evaluation, possible consult for surgical intervention gluteal tear.\par \par Patient will call with any questions or concerns.  Patient understands agrees with plan.  Seen under supervision of Dr. Ricardo

## 2023-03-14 ENCOUNTER — APPOINTMENT (OUTPATIENT)
Dept: HEMATOLOGY ONCOLOGY | Facility: CLINIC | Age: 78
End: 2023-03-14

## 2023-03-23 ENCOUNTER — APPOINTMENT (OUTPATIENT)
Dept: HEMATOLOGY ONCOLOGY | Facility: CLINIC | Age: 78
End: 2023-03-23
Payer: MEDICARE

## 2023-03-23 ENCOUNTER — LABORATORY RESULT (OUTPATIENT)
Age: 78
End: 2023-03-23

## 2023-03-23 ENCOUNTER — OUTPATIENT (OUTPATIENT)
Dept: OUTPATIENT SERVICES | Facility: HOSPITAL | Age: 78
LOS: 1 days | End: 2023-03-23
Payer: MEDICARE

## 2023-03-23 VITALS
DIASTOLIC BLOOD PRESSURE: 64 MMHG | RESPIRATION RATE: 14 BRPM | SYSTOLIC BLOOD PRESSURE: 135 MMHG | TEMPERATURE: 96.6 F | HEIGHT: 62 IN | OXYGEN SATURATION: 98 % | HEART RATE: 79 BPM | BODY MASS INDEX: 34.04 KG/M2 | WEIGHT: 185 LBS

## 2023-03-23 DIAGNOSIS — Z93.2 ILEOSTOMY STATUS: Chronic | ICD-10-CM

## 2023-03-23 DIAGNOSIS — Z98.41 CATARACT EXTRACTION STATUS, RIGHT EYE: Chronic | ICD-10-CM

## 2023-03-23 DIAGNOSIS — Z90.49 ACQUIRED ABSENCE OF OTHER SPECIFIED PARTS OF DIGESTIVE TRACT: Chronic | ICD-10-CM

## 2023-03-23 DIAGNOSIS — Z90.710 ACQUIRED ABSENCE OF BOTH CERVIX AND UTERUS: Chronic | ICD-10-CM

## 2023-03-23 DIAGNOSIS — D68.69 OTHER THROMBOPHILIA: ICD-10-CM

## 2023-03-23 PROCEDURE — 99213 OFFICE O/P EST LOW 20 MIN: CPT

## 2023-03-23 PROCEDURE — 82728 ASSAY OF FERRITIN: CPT

## 2023-03-23 PROCEDURE — 85027 COMPLETE CBC AUTOMATED: CPT

## 2023-03-24 DIAGNOSIS — I26.99 OTHER PULMONARY EMBOLISM WITHOUT ACUTE COR PULMONALE: ICD-10-CM

## 2023-03-24 DIAGNOSIS — D68.69 OTHER THROMBOPHILIA: ICD-10-CM

## 2023-03-24 LAB
FERRITIN SERPL-MCNC: 53 NG/ML
HCT VFR BLD CALC: 43.8 %
HGB BLD-MCNC: 13.6 G/DL
MCHC RBC-ENTMCNC: 27.8 PG
MCHC RBC-ENTMCNC: 31.1 G/DL
MCV RBC AUTO: 89.6 FL
PLATELET # BLD AUTO: 239 K/UL
PMV BLD: 9.2 FL
RBC # BLD: 4.89 M/UL
RBC # FLD: 15.2 %
WBC # FLD AUTO: 12.75 K/UL

## 2023-03-24 NOTE — ASSESSMENT
[FreeTextEntry1] : 1)76 year old female with autoimmune pancreatitis , ulcerative colitis , inactive on long term steroid therapy , s/p total colectomy .\par 2)Unprovoked PE with right heart strain in the background  of Covid 19 infection ( six months earlier )and with positive convalescent antibodies . No prior trauma or travel . \par on indefinite anticoagulation , \par 3)elevated LDH , \par 4)nephrolithiasis , mild hypercalcemia . \par 5) Iron deficiency anemia s/p iron infusion , normal Hgb . malabsorption on PPI , rule out GI bleeding . \par leukocytosis due to steroids. \par Plan : follow up with GI . \par         check ferritin \par        follow up in 6 months . \par

## 2023-03-24 NOTE — HISTORY OF PRESENT ILLNESS
[de-identified] : 06/28/2021 Patient returns for follow up and for second opinion for VTE diagnosed in October 2020 . History as outlined by Dr Jerome . In summary , she has history of ulcerative colitis s/p total colectomy and ileostomy 20 years ago , currently inactive , chronic autoimmune pancreatitis , on steroids for few years with frequent flare ups . In October 2020 ( 6 months after she contracted Covid 19 )  she developed an episode of bilateral PEs with right heart strain and  negative venous doppler . She has been on eliquis without recurrence , recent V/Q scan , PFTs and venous doppler are all normal . She suffers from severe left knee arthritis and is planning TKA soon . Of note she gained some weight due to decreased activity . She is currently on prednisone 15 mg and has been on 30 mg during acute flare up of pancreatitis. recent rheumatologic evaluation was unrevealing . \par \par 09/02/2021 Patient returns for abnormal blood work , elevated LDH . since her last visit she was diagnosed with right nephro ureterolithiasis , she has mild hypercalcemia , low vitamin D level , elevated wbc and glucose from steroids ( for auto-immune pancreatitis ) \par \par 6/27/2022 Patient returns for follow up , last Hgb was 10.8 ( baseline around 12 ) , she continues on eliquis 2.5 bid and denies recurrence of DVT or PE . She continues on prednisone 20 mg , unable to taper and complicated by diabetes , currently on Januvia and FARXIGA . she lost few lbs with diet and increased activity . She denies hematochezia , s/p total colectomy . She takes vitamin D and multi vitamins . She continues to pass kidney stones every few months . \par \par 9/13/2022 Patient returns for VTE and iron deficiency , corrected with iron infusions . She feels well , denies any bleeding from ileostomy . She continues on prednisone and PPI , unable to charmaine and had no response to immuran . \par \par 3/23/2023 Patient returns for follow up , she denies any SOB , chest pain or abnormal bleeding . She complains of bilateral hip pain and is scheduled for MRI . Last Hgb was normal , ferritin : 66 in December . She has chronic leukocytosis from steroids.

## 2023-04-12 ENCOUNTER — APPOINTMENT (OUTPATIENT)
Dept: ORTHOPEDIC SURGERY | Facility: CLINIC | Age: 78
End: 2023-04-12
Payer: MEDICARE

## 2023-04-12 PROCEDURE — 99214 OFFICE O/P EST MOD 30 MIN: CPT

## 2023-04-12 NOTE — HISTORY OF PRESENT ILLNESS
[de-identified] : Chief complaint: BL hip\par \par 77 year old female presents with 4 months BL hip pain, L>R. She is a homemaker. Denies injury/trauma. Describes the pain as a 4 on the pain scale. States she has difficulty going up the stairs. Had a LT knee replacement with Dr. Grullon 10/2021. LT hip pain radiates to her posterior LT knee. She has been seeing LELE Castillo for her hip, she has received cortisone injections. She has also gone to a chiropractor. States the cortisone injections did not elevate her blood glucose levels. She takes 20 mg of prednisone everyday.  She states her RT hip feels much better.  After cortisone shot\par \par H/o hypertension, auto immune pancreatitis, GERD, diabetes. Allergic Cipro, Ceftin, sulfa, penicillin. Knee replacement LT 10/2021.\par \par MRI of the LT hip done on 04/05/23 shows:\par 1. Full-thickness semimembranosus origin tear with slight inferior retraction.\par 2. Superior labral degeneration with evidence for a tear.\par \par MRI of the RT hip done on 02/02/23 shows:\par 1. Torn superior labrum full-thickness tear hamstring origin with inferior retraction. \par 2. Full- thickness trochanteric insertional gluteal tear\par \par On exam she is minimally tender on exam of the left hip with the range of motion and resistance on extension\par \par Recommending formal physical therapy with a home program for her LT hip. She is unable to take NSAIDs. She will follow-up in 6-8 weeks.  If she fails to improves he can see dr osbaldo spann

## 2023-05-02 ENCOUNTER — APPOINTMENT (OUTPATIENT)
Dept: ORTHOPEDIC SURGERY | Facility: CLINIC | Age: 78
End: 2023-05-02
Payer: MEDICARE

## 2023-05-02 PROCEDURE — 73030 X-RAY EXAM OF SHOULDER: CPT | Mod: RT

## 2023-05-02 PROCEDURE — 20611 DRAIN/INJ JOINT/BURSA W/US: CPT | Mod: RT

## 2023-05-02 PROCEDURE — 99214 OFFICE O/P EST MOD 30 MIN: CPT | Mod: 25

## 2023-05-02 NOTE — HISTORY OF PRESENT ILLNESS
[de-identified] : Patient is here for evaluation of right shoulder pain\par Affecting quality of life\par Has pain and weakness with loss of rom\par Wakes up at night due to pain\par \par NAD\par Right shoulder:\par TTP ant GH joint, bicipital groove\par FF 0-140 (passive 175)\par ER 40\par IR L5\par Pain with terminal rom\par Weakness to abduction and ER\par Pos Impingement\par Pos Gabriel\par Pos Cross Arm Adduction\par Negative instability\par Positive scapula dyskinesia\par \par XRay right shoulder negative for fracture, dislocation, arthritis\par \par Plan\par went over findings\par explained the xray\par will get mri right shoulder\par pt script\par due to pain, right shoulder injection\par h/o christine hip pain, will refer to PM for possible injections\par \par Large Joint Injection was performed because of pain/rom. Anesthesia: ethyl chloride sprayed topically.\par Dexamethasone 2 cc of 4mg.  \par Lidocaine: 2 cc of 1% \par Medication was injected in the right shoulder. Patient has tried OTC's including aspirin, Ibuprofen, Aleve etc or prescription NSAIDS, and/or exercises at home and/ or physical therapy without satisfactory response. After verbal consent using sterile preparation and technique. The risks, benefits, and alternatives to cortisone injection were explained in full to the patient. Risks outlined include but are not limited to infection, sepsis, bleeding, scarring, skin discoloration, temporary increase in pain, syncopal episode, failure to resolve symptoms, allergic reaction, symptom recurrence, and elevation of blood sugar in diabetics. Patient understood the risks. All questions were answered. After discussion of options, patient requested an injection. Oral informed consent was obtained and sterile prep was done of the injection site. Sterile technique was utilized for the procedure including the preparation of the solutions used for the injection. Patient tolerated the procedure well. Advised to ice the injection site this evening. Prep with alcohol locally to site. Sterile technique used. Diagnostic ultrasound was performed of the shoulder to confirm.\par

## 2023-05-26 ENCOUNTER — APPOINTMENT (OUTPATIENT)
Dept: UROLOGY | Facility: CLINIC | Age: 78
End: 2023-05-26

## 2023-05-31 ENCOUNTER — APPOINTMENT (OUTPATIENT)
Dept: ORTHOPEDIC SURGERY | Facility: CLINIC | Age: 78
End: 2023-05-31

## 2023-06-23 ENCOUNTER — APPOINTMENT (OUTPATIENT)
Dept: ORTHOPEDIC SURGERY | Facility: CLINIC | Age: 78
End: 2023-06-23
Payer: MEDICARE

## 2023-06-23 DIAGNOSIS — M25.512 PAIN IN LEFT SHOULDER: ICD-10-CM

## 2023-06-23 DIAGNOSIS — M25.511 PAIN IN RIGHT SHOULDER: ICD-10-CM

## 2023-06-23 PROCEDURE — 99214 OFFICE O/P EST MOD 30 MIN: CPT

## 2023-06-23 NOTE — HISTORY OF PRESENT ILLNESS
[de-identified] : Patient is here for evaluation of right shoulder pain\par Affecting quality of life\par Has pain and weakness with loss of rom\par Wakes up at night due to pain\par \par Still in pain\par \par NAD\par Right shoulder:\par TTP ant GH joint, bicipital groove\par FF 0-140 (passive 175)\par ER 40\par IR L5\par Pain with terminal rom\par Weakness to abduction and ER\par Pos Impingement\par Pos Gabriel\par Pos Cross Arm Adduction\par Negative instability\par Positive scapula dyskinesia\par \par XRay right shoulder negative for fracture, dislocation, arthritis\par \par mri right shoulder: fRCT with retraction\par \par Plan\par went over findings\par explained the xray\par explained mri and tears\par explained the massive tear and possible need for SCR and rTSA in the future\par right shoulder arthroscopy, rotator cuff repair, decompression, possible superior capsule reconstruction with allograft\par \par Operative and nonoperative options discussed with patient. Surgical risks, benefits, and alternatives explained. Surgical risks include but are not exclusive to bleeding, infection, neurovascular damage, continued pain, stiffness, scarring, rsd, dvt/pe, potential failure of surgery that may require further surgery in the future. I went over incisions and rehabilitation. All questions answered. \par \par \par

## 2023-07-14 ENCOUNTER — APPOINTMENT (OUTPATIENT)
Dept: ORTHOPEDIC SURGERY | Facility: CLINIC | Age: 78
End: 2023-07-14

## 2023-08-01 NOTE — PATIENT PROFILE ADULT - NSPRESCRUSEDDRG_GEN_A_NUR
Progress Note - Surgical Oncology  : [facility] Resident on [facility] 82 y.o. female MRN: [id number]  Unit/Bed#: [facility] 924-01 Encounter: [id number]    Assessment:  82 y.o. female w/ PMHx of Parkinson's, dysphagia, pancreatic cyst s/p distal pancreatectomy, splenectomy and lysis of adhesions. AVSS on 2 L nasal cannula, afebrile, hemodynamically stable    UOP: 1500 cc  BM: 188cc ss    Am labs pending    Plan: Shelley catheter out  A-line out  Continue to monitor left upper quadrant ARNIE drain  Maintain epidural  Speech eval today, for history of dysphagia  Splenic vaccines  Out of bed, encourage ambulation, I-S use  DVT prophylaxis    Subjective/Objective     Subjective: No acute events overnight patient states that her pain is well controlled on the PCEA, no nausea no vomiting, denies flatus or bowel movement. Pulling 1500cc on incentive spirometer    Objective:     Physical Exam:  GEN: NAD  HEENT: MMM  CV: RRR  Lung: Normal effort  Ab: Soft, mildly distended, post surgical tenderness, incision clean dry and intact  Extrem: No CCE   Neuro: A+Ox3     Vitals: Temp:  [97.4 °F (36.3 °C)-98.9 °F (37.2 °C)] 98.9 °F (37.2 °C)  HR:  [67-95] 67  Resp:  [12-18] 14  BP: ()/(43-81) 114/52  Arterial Line BP: ()/(38-50) 118/50  Body mass index is 31.18 kg/m². I/O       07/30 0701  07/31 0700 07/31 0701  08/01 0700    I.V. (mL/kg)  2362.7 (31.6)    IV Piggyback  500    Total Intake(mL/kg)  2862.7 (38.3)    Urine (mL/kg/hr)  1500    Drains  188    Blood  250    Total Output  1938    Net  +924.7                Lab, Imaging and other studies: I have personally reviewed pertinent reports.   , CBC with diff:   Lab Results   Component Value Date    HGB 11.9 07/31/2023    HCT 35 07/31/2023   , BMP/CMP:   Lab Results   Component Value Date    CO2 25 07/31/2023    GLUCOSE 139 07/31/2023     VTE Pharmacologic Prophylaxis: Heparin  VTE Mechanical Prophylaxis: sequential compression device    Noam Toth MD  8/1/2023 5:19 AM No

## 2023-08-22 ENCOUNTER — APPOINTMENT (OUTPATIENT)
Dept: ORTHOPEDIC SURGERY | Facility: CLINIC | Age: 78
End: 2023-08-22

## 2023-08-28 ENCOUNTER — APPOINTMENT (OUTPATIENT)
Dept: CARDIOLOGY | Facility: CLINIC | Age: 78
End: 2023-08-28
Payer: MEDICARE

## 2023-08-28 VITALS
BODY MASS INDEX: 35.88 KG/M2 | SYSTOLIC BLOOD PRESSURE: 140 MMHG | HEART RATE: 79 BPM | HEIGHT: 62 IN | DIASTOLIC BLOOD PRESSURE: 72 MMHG | WEIGHT: 195 LBS

## 2023-08-28 DIAGNOSIS — Z01.810 ENCOUNTER FOR PREPROCEDURAL CARDIOVASCULAR EXAMINATION: ICD-10-CM

## 2023-08-28 PROCEDURE — 99214 OFFICE O/P EST MOD 30 MIN: CPT

## 2023-08-28 PROCEDURE — 93000 ELECTROCARDIOGRAM COMPLETE: CPT

## 2023-08-29 ENCOUNTER — APPOINTMENT (OUTPATIENT)
Dept: ORTHOPEDIC SURGERY | Facility: CLINIC | Age: 78
End: 2023-08-29

## 2023-09-02 PROBLEM — Z01.810 PREOPERATIVE CARDIOVASCULAR EXAMINATION: Status: ACTIVE | Noted: 2023-09-02

## 2023-09-02 RX ORDER — HYDROCHLOROTHIAZIDE 12.5 MG/1
12.5 CAPSULE ORAL EVERY OTHER DAY
Refills: 0 | Status: ACTIVE | COMMUNITY

## 2023-09-02 NOTE — PHYSICAL EXAM
[de-identified] : well appearing, overweight, no distress [de-identified] : reg, nL s1/s2, no m/r/g [de-identified] : CTA [de-identified] : alert, normal affect, logical conversation

## 2023-09-02 NOTE — DISCUSSION/SUMMARY
[FreeTextEntry1] : No further cardiac testing required prior to rotator cuff surgery. Perioperative anticoagulation per pulmonary / hematology.  Cont Amlodipine-Benazepril. Cont Bisoprolol. Cont HCTZ. Cont Lipitor Weight loss / light exercise. Follow-up 6-months [EKG obtained to assist in diagnosis and management of assessed problem(s)] : EKG obtained to assist in diagnosis and management of assessed problem(s)

## 2023-09-02 NOTE — ASSESSMENT
[FreeTextEntry1] : Acute PE (11/2020): No RV dysfunction.  Borderline BP control.  Low risk patient for perioperative cardiac events. Intermediate risk surgery. No cardiac decompensation. Perioperative thromboembolic risk elevated given prior PE.

## 2023-09-02 NOTE — REASON FOR VISIT
[FreeTextEntry1] : Feels well.  Scheduled for rotator cuff surgery.  No interval cardiac symptoms.  Breathing comfortable.  No exercise.  Gained 10-lbs. [Follow-Up - Clinic] : a clinic follow-up of [Hypertension] : hypertension

## 2023-09-18 ENCOUNTER — APPOINTMENT (OUTPATIENT)
Dept: ORTHOPEDIC SURGERY | Facility: AMBULATORY SURGERY CENTER | Age: 78
End: 2023-09-18

## 2023-09-21 ASSESSMENT — KOOS JR
IMPORTED FORM: YES
GOING UP OR DOWN STAIRS: SEVERE
KOOS JR RAW SCORE: ERROR: CALCULATION NOT POSSIBLE
RISING FROM SITTING: SEVERE
BENDING TO THE FLOOR TO PICK UP OBJECT: SEVERE
HOW SEVERE IS YOUR KNEE STIFFNESS AFTER FIRST WAKING IN MORNING: EXTREME
IMPORTED KOOS JR SCORE: 19.0
STANDING UPRIGHT: SEVERE
TWISING OR PIVOTING ON KNEE: SEVERE

## 2023-09-26 ENCOUNTER — APPOINTMENT (OUTPATIENT)
Dept: ORTHOPEDIC SURGERY | Facility: CLINIC | Age: 78
End: 2023-09-26

## 2023-09-28 ENCOUNTER — OUTPATIENT (OUTPATIENT)
Dept: OUTPATIENT SERVICES | Facility: HOSPITAL | Age: 78
LOS: 1 days | End: 2023-09-28
Payer: MEDICARE

## 2023-09-28 ENCOUNTER — APPOINTMENT (OUTPATIENT)
Dept: HEMATOLOGY ONCOLOGY | Facility: CLINIC | Age: 78
End: 2023-09-28
Payer: MEDICARE

## 2023-09-28 ENCOUNTER — LABORATORY RESULT (OUTPATIENT)
Age: 78
End: 2023-09-28

## 2023-09-28 VITALS
SYSTOLIC BLOOD PRESSURE: 139 MMHG | HEIGHT: 62 IN | OXYGEN SATURATION: 98 % | TEMPERATURE: 97.9 F | HEART RATE: 87 BPM | RESPIRATION RATE: 14 BRPM | DIASTOLIC BLOOD PRESSURE: 81 MMHG | WEIGHT: 191 LBS | BODY MASS INDEX: 35.15 KG/M2

## 2023-09-28 DIAGNOSIS — Z90.710 ACQUIRED ABSENCE OF BOTH CERVIX AND UTERUS: Chronic | ICD-10-CM

## 2023-09-28 DIAGNOSIS — D68.69 OTHER THROMBOPHILIA: ICD-10-CM

## 2023-09-28 DIAGNOSIS — Z90.49 ACQUIRED ABSENCE OF OTHER SPECIFIED PARTS OF DIGESTIVE TRACT: Chronic | ICD-10-CM

## 2023-09-28 DIAGNOSIS — I26.99 OTHER PULMONARY EMBOLISM WITHOUT ACUTE COR PULMONALE: ICD-10-CM

## 2023-09-28 DIAGNOSIS — Z93.2 ILEOSTOMY STATUS: Chronic | ICD-10-CM

## 2023-09-28 DIAGNOSIS — Z98.41 CATARACT EXTRACTION STATUS, RIGHT EYE: Chronic | ICD-10-CM

## 2023-09-28 DIAGNOSIS — K85.90 ACUTE PANCREATITIS WITHOUT NECROSIS OR INFECTION, UNSPECIFIED: ICD-10-CM

## 2023-09-28 LAB
HCT VFR BLD CALC: 43.5 %
HGB BLD-MCNC: 13.3 G/DL
MCHC RBC-ENTMCNC: 27.9 PG
MCHC RBC-ENTMCNC: 30.6 G/DL
MCV RBC AUTO: 91.2 FL
PLATELET # BLD AUTO: 292 K/UL
PMV BLD: 9.1 FL
RBC # BLD: 4.77 M/UL
RBC # FLD: 15.9 %
WBC # FLD AUTO: 12.21 K/UL

## 2023-09-28 PROCEDURE — 81207 BCR/ABL1 GENE MINOR BP: CPT

## 2023-09-28 PROCEDURE — 81206 BCR/ABL1 GENE MAJOR BP: CPT

## 2023-09-28 PROCEDURE — 81270 JAK2 GENE: CPT

## 2023-09-28 PROCEDURE — G0452: CPT | Mod: 26

## 2023-09-28 PROCEDURE — 99213 OFFICE O/P EST LOW 20 MIN: CPT

## 2023-09-28 PROCEDURE — 85027 COMPLETE CBC AUTOMATED: CPT

## 2023-09-28 PROCEDURE — 82728 ASSAY OF FERRITIN: CPT

## 2023-10-02 LAB
FERRITIN SERPL-MCNC: 63 NG/ML
FERRITIN SERPL-MCNC: 64 NG/ML

## 2023-10-03 LAB — T(9;22)(ABL1,BCR)/CONTROL BLD/T: NORMAL

## 2023-10-05 LAB
JAK2 P.V617F BLD/T QL: NORMAL
REFLEX:: NORMAL

## 2023-10-17 ENCOUNTER — APPOINTMENT (OUTPATIENT)
Dept: PULMONOLOGY | Facility: CLINIC | Age: 78
End: 2023-10-17
Payer: MEDICARE

## 2023-10-17 DIAGNOSIS — R06.02 SHORTNESS OF BREATH: ICD-10-CM

## 2023-10-17 DIAGNOSIS — I26.99 OTHER PULMONARY EMBOLISM W/OUT ACUTE COR PULMONALE: ICD-10-CM

## 2023-10-17 PROCEDURE — 94010 BREATHING CAPACITY TEST: CPT

## 2023-10-17 PROCEDURE — 99214 OFFICE O/P EST MOD 30 MIN: CPT | Mod: 25

## 2023-10-17 PROCEDURE — 94727 GAS DIL/WSHOT DETER LNG VOL: CPT

## 2023-10-17 PROCEDURE — 94729 DIFFUSING CAPACITY: CPT

## 2023-10-17 RX ORDER — APIXABAN 2.5 MG/1
2.5 TABLET, FILM COATED ORAL
Qty: 180 | Refills: 3 | Status: ACTIVE | COMMUNITY
Start: 2023-10-17 | End: 1900-01-01

## 2023-10-26 ENCOUNTER — APPOINTMENT (OUTPATIENT)
Dept: OPHTHALMOLOGY | Facility: CLINIC | Age: 78
End: 2023-10-26

## 2023-12-08 ENCOUNTER — APPOINTMENT (OUTPATIENT)
Dept: ORTHOPEDIC SURGERY | Facility: CLINIC | Age: 78
End: 2023-12-08
Payer: MEDICARE

## 2023-12-08 PROCEDURE — 73562 X-RAY EXAM OF KNEE 3: CPT | Mod: LT

## 2023-12-08 PROCEDURE — 99213 OFFICE O/P EST LOW 20 MIN: CPT

## 2023-12-15 ENCOUNTER — APPOINTMENT (OUTPATIENT)
Dept: CARDIOLOGY | Facility: CLINIC | Age: 78
End: 2023-12-15
Payer: MEDICARE

## 2023-12-15 VITALS
HEIGHT: 62 IN | DIASTOLIC BLOOD PRESSURE: 70 MMHG | BODY MASS INDEX: 34.23 KG/M2 | HEART RATE: 89 BPM | WEIGHT: 186 LBS | SYSTOLIC BLOOD PRESSURE: 134 MMHG

## 2023-12-15 PROCEDURE — 99214 OFFICE O/P EST MOD 30 MIN: CPT

## 2023-12-15 PROCEDURE — 93000 ELECTROCARDIOGRAM COMPLETE: CPT

## 2024-01-08 NOTE — DISCUSSION/SUMMARY
[FreeTextEntry1] : Pharm NST Cont Amlodipine-Benazepril. Cont Bisoprolol. Cont HCTZ. Cont Lipitor Weight loss / light exercise. Follow-up 3-months [EKG obtained to assist in diagnosis and management of assessed problem(s)] : EKG obtained to assist in diagnosis and management of assessed problem(s)

## 2024-01-08 NOTE — ASSESSMENT
[FreeTextEntry1] : Acute PE (11/2020) No pulm HTN / RV dysfunction  BP controlled.  Exertional dyspnea. Maybe deconditioning.  Possible anginal equivalent.

## 2024-01-08 NOTE — REASON FOR VISIT
[Follow-Up - Clinic] : a clinic follow-up of [Hypertension] : hypertension [FreeTextEntry1] : Feels well.  Exertional dyspnea.  Pulmonologist this may be related to age, weight, and deconditioning, but relatively new symptom and concerning to her.  No chest pain.  No palpitations, lightheadedness, syncope.

## 2024-01-08 NOTE — PHYSICAL EXAM
[de-identified] : well appearing, overweight, no distress [de-identified] : reg, nL s1/s2, no m/r/g [de-identified] : CTA [de-identified] : alert, normal affect, logical conversation

## 2024-01-19 ENCOUNTER — APPOINTMENT (OUTPATIENT)
Dept: ORTHOPEDIC SURGERY | Facility: CLINIC | Age: 79
End: 2024-01-19
Payer: MEDICARE

## 2024-01-19 DIAGNOSIS — M16.12 UNILATERAL PRIMARY OSTEOARTHRITIS, LEFT HIP: ICD-10-CM

## 2024-01-19 PROCEDURE — 99213 OFFICE O/P EST LOW 20 MIN: CPT

## 2024-01-19 PROCEDURE — 73502 X-RAY EXAM HIP UNI 2-3 VIEWS: CPT

## 2024-01-19 NOTE — PHYSICAL EXAM
[Left] : left hip [2+] : posterior tibialis pulse: 2+ [] : no calf tenderness [FreeTextEntry9] : Range of motion 0 degrees to 120 degrees with discomfort [de-identified] : Good strength [de-identified] : Ligamentously intact

## 2024-01-19 NOTE — DISCUSSION/SUMMARY
[de-identified] : Discussed with patient detail that her pain is likely coming from the knee.  Prior x-rays of left knee show surgical hardware intact in good position.  Left hip x-rays show mild to moderate degenerative changes.  Advised rest, ice that she can range of motion exercise, physical therapy.  Patient sent for blood work to rule out inflammatory cause.  Patient will follow-up for blood work results.  Seen with Dr. Grullon

## 2024-01-19 NOTE — DATA REVIEWED
[FreeTextEntry1] : X-ray bilateral hips for comparison: No acute fractures, subluxation, dislocations.  Mild to moderate degenerative changes

## 2024-01-19 NOTE — HISTORY OF PRESENT ILLNESS
[de-identified] : Patient is a 78-year-old female here for re-evaluation of left knee.  Patient status post left total knee replacement about 2 years ago.  Patient states since last visit her pain has been the same is not worsening.  Patient is using cane for ambulation.  Patient states pain to left knee is constant especially worsens with activities.

## 2024-01-31 NOTE — ED ADULT TRIAGE NOTE - NS ED TRIAGE AVPU SCALE
Alert-The patient is alert, awake and responds to voice. The patient is oriented to time, place, and person. The triage nurse is able to obtain subjective information. Ankle fracture, right

## 2024-04-09 ENCOUNTER — APPOINTMENT (OUTPATIENT)
Dept: ORTHOPEDIC SURGERY | Facility: CLINIC | Age: 79
End: 2024-04-09
Payer: MEDICARE

## 2024-04-09 PROCEDURE — 99213 OFFICE O/P EST LOW 20 MIN: CPT

## 2024-04-09 NOTE — HISTORY OF PRESENT ILLNESS
[de-identified] : Patient is a 78-year-old female here for re-evaluation of left knee.  Patient status post left total knee replacement about 2 years ago.  Patient states since last visit her pain has been the same is not worsening.  Patient is using cane for ambulation.  Patient states pain to left knee is constant especially worsens with activities.

## 2024-04-09 NOTE — DISCUSSION/SUMMARY
[de-identified] : Reviewed prior x-rays of left knee in detail with patient showing surgical intact good position.  Reviewed blood work results showing no findings of inflammatory cause.  Advised rest, ice that she can range of motion exercise, physical therapy.  Patient will follow-up in few months for reevaluation.  Patient understands agrees with plan.

## 2024-04-09 NOTE — PHYSICAL EXAM
[Left] : left knee [] : light touch is intact throughout [de-identified] : Good strength [FreeTextEntry9] : Range of motion 0 degrees to 120 degrees with discomfort [de-identified] : Ligamentously intact

## 2024-04-10 ENCOUNTER — OUTPATIENT (OUTPATIENT)
Dept: OUTPATIENT SERVICES | Facility: HOSPITAL | Age: 79
LOS: 1 days | End: 2024-04-10
Payer: MEDICARE

## 2024-04-10 ENCOUNTER — RESULT REVIEW (OUTPATIENT)
Age: 79
End: 2024-04-10

## 2024-04-10 DIAGNOSIS — Z90.49 ACQUIRED ABSENCE OF OTHER SPECIFIED PARTS OF DIGESTIVE TRACT: Chronic | ICD-10-CM

## 2024-04-10 DIAGNOSIS — Z98.41 CATARACT EXTRACTION STATUS, RIGHT EYE: Chronic | ICD-10-CM

## 2024-04-10 DIAGNOSIS — Z93.2 ILEOSTOMY STATUS: Chronic | ICD-10-CM

## 2024-04-10 DIAGNOSIS — R06.09 OTHER FORMS OF DYSPNEA: ICD-10-CM

## 2024-04-10 DIAGNOSIS — Z90.710 ACQUIRED ABSENCE OF BOTH CERVIX AND UTERUS: Chronic | ICD-10-CM

## 2024-04-10 PROCEDURE — 93018 CV STRESS TEST I&R ONLY: CPT

## 2024-04-10 PROCEDURE — A9500: CPT

## 2024-04-10 PROCEDURE — 78452 HT MUSCLE IMAGE SPECT MULT: CPT

## 2024-04-10 PROCEDURE — 78452 HT MUSCLE IMAGE SPECT MULT: CPT | Mod: 26,MH

## 2024-04-11 ENCOUNTER — OUTPATIENT (OUTPATIENT)
Dept: OUTPATIENT SERVICES | Facility: HOSPITAL | Age: 79
LOS: 1 days | End: 2024-04-11
Payer: MEDICARE

## 2024-04-11 ENCOUNTER — LABORATORY RESULT (OUTPATIENT)
Age: 79
End: 2024-04-11

## 2024-04-11 ENCOUNTER — APPOINTMENT (OUTPATIENT)
Age: 79
End: 2024-04-11
Payer: MEDICARE

## 2024-04-11 VITALS
SYSTOLIC BLOOD PRESSURE: 146 MMHG | HEART RATE: 69 BPM | BODY MASS INDEX: 32.94 KG/M2 | WEIGHT: 179 LBS | DIASTOLIC BLOOD PRESSURE: 75 MMHG | HEIGHT: 62 IN | OXYGEN SATURATION: 98 % | TEMPERATURE: 97.9 F | RESPIRATION RATE: 14 BRPM

## 2024-04-11 DIAGNOSIS — Z90.49 ACQUIRED ABSENCE OF OTHER SPECIFIED PARTS OF DIGESTIVE TRACT: Chronic | ICD-10-CM

## 2024-04-11 DIAGNOSIS — Z98.41 CATARACT EXTRACTION STATUS, RIGHT EYE: Chronic | ICD-10-CM

## 2024-04-11 DIAGNOSIS — Z93.2 ILEOSTOMY STATUS: Chronic | ICD-10-CM

## 2024-04-11 DIAGNOSIS — R06.09 OTHER FORMS OF DYSPNEA: ICD-10-CM

## 2024-04-11 DIAGNOSIS — I26.99 OTHER PULMONARY EMBOLISM WITHOUT ACUTE COR PULMONALE: ICD-10-CM

## 2024-04-11 DIAGNOSIS — Z90.710 ACQUIRED ABSENCE OF BOTH CERVIX AND UTERUS: Chronic | ICD-10-CM

## 2024-04-11 DIAGNOSIS — D68.69 OTHER THROMBOPHILIA: ICD-10-CM

## 2024-04-11 PROCEDURE — 85027 COMPLETE CBC AUTOMATED: CPT

## 2024-04-11 PROCEDURE — 99213 OFFICE O/P EST LOW 20 MIN: CPT

## 2024-04-11 PROCEDURE — 82728 ASSAY OF FERRITIN: CPT

## 2024-04-12 DIAGNOSIS — D68.69 OTHER THROMBOPHILIA: ICD-10-CM

## 2024-04-12 DIAGNOSIS — I26.99 OTHER PULMONARY EMBOLISM WITHOUT ACUTE COR PULMONALE: ICD-10-CM

## 2024-04-12 LAB
FERRITIN SERPL-MCNC: 73 NG/ML
HCT VFR BLD CALC: 43 %
HGB BLD-MCNC: 13.5 G/DL
MCHC RBC-ENTMCNC: 27.8 PG
MCHC RBC-ENTMCNC: 31.4 G/DL
MCV RBC AUTO: 88.7 FL
PLATELET # BLD AUTO: 219 K/UL
PMV BLD: 9.6 FL
RBC # BLD: 4.85 M/UL
RBC # FLD: 18.5 %
WBC # FLD AUTO: 12.84 K/UL

## 2024-04-12 NOTE — HISTORY OF PRESENT ILLNESS
[de-identified] : he patient is a 75 year old white female referred by Dr. Darnell Hassan for evaluation of thrombophilia. \par  The patient was hospitalized this past November 2nd for inability to breath following a period of 1-1.5 weeks of difficulty breathing. The patient was diagnosed with multiple pulmonary emboli with apparently a negative duplex-Doppler study. She spent about 4 days in the hospital, was diagnosed with the PE and was started on anticoagulation with heparin drip then Lovenox, eventually discharged on Eliquis. \par  She is tolerating the Eliquis well. No bruising or bleeding. The SOB is now 90% better. She may still be having some difficulty climbing steps. \par  The patient is denying any weight loss. She was recently diagnosed with autoimmune pancreatitis and has been on steroids since August. She is now being referred to a rheumatologist for further evaluation. No fever or night sweats. Appetite is good. No problems with urine. No new problems with ileostomy. \par  She is up to date with her mammograms.\par  \par  The patient was diagnosed as having being infected with Covid-19 but did not require hospitalization. Her  was affected by the same virus, hospitalized and passed away from to the infection.\par   \par   [de-identified] : 06/28/2021 Patient returns for follow up and for second opinion for VTE diagnosed in October 2020 . History as outlined by Dr Jerome . In summary , she has history of ulcerative colitis s/p total colectomy and ileostomy 20 years ago , currently inactive , chronic autoimmune pancreatitis , on steroids for few years with frequent flare ups . In October 2020 ( 6 months after she contracted Covid 19 )  she developed an episode of bilateral PEs with right heart strain and  negative venous doppler . She has been on eliquis without recurrence , recent V/Q scan , PFTs and venous doppler are all normal . She suffers from severe left knee arthritis and is planning TKA soon . Of note she gained some weight due to decreased activity . She is currently on prednisone 15 mg and has been on 30 mg during acute flare up of pancreatitis. recent rheumatologic evaluation was unrevealing .   09/02/2021 Patient returns for abnormal blood work , elevated LDH . since her last visit she was diagnosed with right nephro ureterolithiasis , she has mild hypercalcemia , low vitamin D level , elevated wbc and glucose from steroids ( for auto-immune pancreatitis )   6/27/2022 Patient returns for follow up , last Hgb was 10.8 ( baseline around 12 ) , she continues on eliquis 2.5 bid and denies recurrence of DVT or PE . She continues on prednisone 20 mg , unable to taper and complicated by diabetes , currently on Januvia and FARXIGA . she lost few lbs with diet and increased activity . She denies hematochezia , s/p total colectomy . She takes vitamin D and multi vitamins . She continues to pass kidney stones every few months .   9/13/2022 Patient returns for VTE and iron deficiency , corrected with iron infusions . She feels well , denies any bleeding from ileostomy . She continues on prednisone and PPI , unable to charmaine and had no response to immuran .   3/23/2023 Patient returns for follow up , she denies any SOB , chest pain or abnormal bleeding . She complains of bilateral hip pain and is scheduled for MRI . Last Hgb was normal , ferritin : 66 in December . She has chronic leukocytosis from steroids.    9/28/2023 Patient returns for follow up prior to surgery for rotator cuff tear. Recent blood work shows normal Hgb , mild leukocytosis with left shift , increased myelocytes, metamyelocytes. She continues on prednisone 20 mg with plan to transition to Humara.   4/11/2024 Patient returns with main complaints of frequent mild  pancreatitis exacerbations  ,with LUQ pain , unable to wean herself off steroids but able to lose some weight , She is apparently not candidate for Humara . She has also left knee giving out s/p arthroplasty 3 years ago . CBC is normal , ferritin is pending

## 2024-04-12 NOTE — ASSESSMENT
[FreeTextEntry1] : 1)76 year old female with autoimmune pancreatitis , ulcerative colitis , inactive on long term steroid therapy , s/p total colectomy . 2)Unprovoked PE with right heart strain in the background  of Covid 19 infection ( six months earlier )and with positive convalescent antibodies . No prior trauma or travel .  on indefinite anticoagulation ,  3)elevated LDH ,  4)nephrolithiasis , mild hypercalcemia .  5) Iron deficiency anemia s/p iron infusion , normal Hgb . malabsorption on PPI , rule out GI bleeding .  leukocytosis with left shift likely from steroids. negative Jak2 and BCR/ABL .  Plan :          check ferritin         follow up in 6 months .

## 2024-04-29 ENCOUNTER — APPOINTMENT (OUTPATIENT)
Dept: ORTHOPEDIC SURGERY | Facility: CLINIC | Age: 79
End: 2024-04-29
Payer: MEDICARE

## 2024-04-29 VITALS — BODY MASS INDEX: 33.13 KG/M2 | HEIGHT: 62 IN | WEIGHT: 180 LBS

## 2024-04-29 DIAGNOSIS — M25.552 PAIN IN LEFT HIP: ICD-10-CM

## 2024-04-29 DIAGNOSIS — Z96.652 PRESENCE OF LEFT ARTIFICIAL KNEE JOINT: ICD-10-CM

## 2024-04-29 PROCEDURE — 99213 OFFICE O/P EST LOW 20 MIN: CPT

## 2024-04-29 PROCEDURE — 73502 X-RAY EXAM HIP UNI 2-3 VIEWS: CPT

## 2024-04-29 PROCEDURE — 73562 X-RAY EXAM OF KNEE 3: CPT | Mod: LT

## 2024-04-29 NOTE — DISCUSSION/SUMMARY
[de-identified] : Left knee and left hip pain  HPI Patient is a 78-year-old female who reports to the office for evaluation of her left knee and left hip pain since Thursday, 4/25/2024.  She states that she accidentally fell down an escalator injuring her left knee and left hip.  She has a history of a left knee replacement done in October 2021 by Dr. Grullon.  Since the injury, walking, range of motion, palpating certain areas aggravate the patient's pain.  Denies any numbness or tingling.  Left knee x-rays taken in office today revealed no obvious fractures, subluxations, or dislocations.  Old intact knee replacement hardware noted.  Otherwise, no other significant abnormalities were seen.  Left hip with pelvic view x-rays taken in office today revealed no obvious fractures, subluxations, or dislocations.  Moderate degenerative/arthritic changes noted.  Otherwise, no other significant abnormalities were seen.  Left hip exam is as follows: No swelling noted.  No erythema or ecchymosis.  TTP over groin.  Decent range of motion of hip with mild pain.  Light touch intact throughout.  Mildly antalgic gait.  Ambulation with cane.  Left knee exam is as follows: Mild effusion was noted.  No erythema.  Ecchymosis noted on anterior aspect of tibial shaft region.  Able to perform active straight leg raise.  Knee flexion from 0 to 110 degrees with mild pain.  Medial/lateral joint line tenderness to palpation.  Calf is soft and nontender.  Light touch intact throughout.  Mild antalgic gait.  Ambulation with cane.  Assessment/plan Explained to the patient that she clinically has contusions.  Explained that this may take several weeks to heal and that the first 2 weeks are usually the worst.  Epsom salt and warm water bath was encouraged.  Advised walker to help with ambulation.  A script for physical therapy was printed for the patient so they can get started on that.  She will take Tylenol arthritis strength as needed for pain.  Follow-up in 3 months.  All questions/concerns were answered in detail.

## 2024-04-29 NOTE — PHYSICAL THERAPY INITIAL EVALUATION ADULT - IMPAIRMENTS CONTRIBUTING IMPAIRED BED MOBILITY, REHAB EVAL
Intubation    Date/Time: 4/29/2024 10:02 AM    Performed by: Travis Castle CRNA  Authorized by: Cricket Cochran decreased endurance/impaired balance/impaired postural control/decreased ROM/decreased strength

## 2024-06-14 ENCOUNTER — APPOINTMENT (OUTPATIENT)
Dept: CARDIOLOGY | Facility: CLINIC | Age: 79
End: 2024-06-14
Payer: MEDICARE

## 2024-06-14 VITALS
HEIGHT: 62 IN | DIASTOLIC BLOOD PRESSURE: 62 MMHG | BODY MASS INDEX: 34.23 KG/M2 | HEART RATE: 70 BPM | SYSTOLIC BLOOD PRESSURE: 116 MMHG | WEIGHT: 186 LBS

## 2024-06-14 DIAGNOSIS — E78.5 HYPERLIPIDEMIA, UNSPECIFIED: ICD-10-CM

## 2024-06-14 DIAGNOSIS — I10 ESSENTIAL (PRIMARY) HYPERTENSION: ICD-10-CM

## 2024-06-14 DIAGNOSIS — R06.09 OTHER FORMS OF DYSPNEA: ICD-10-CM

## 2024-06-14 PROCEDURE — 93000 ELECTROCARDIOGRAM COMPLETE: CPT

## 2024-06-14 PROCEDURE — 99214 OFFICE O/P EST MOD 30 MIN: CPT

## 2024-06-14 RX ORDER — DAPAGLIFLOZIN 10 MG/1
10 TABLET, FILM COATED ORAL
Refills: 0 | Status: ACTIVE | COMMUNITY

## 2024-06-14 RX ORDER — APIXABAN 2.5 MG/1
2.5 TABLET, FILM COATED ORAL
Qty: 180 | Refills: 3 | Status: DISCONTINUED | COMMUNITY
Start: 2022-04-04 | End: 2024-06-14

## 2024-06-14 RX ORDER — DAPAGLIFLOZIN 5 MG/1
5 TABLET, FILM COATED ORAL DAILY
Refills: 0 | Status: DISCONTINUED | COMMUNITY
End: 2024-06-14

## 2024-06-14 RX ORDER — BISOPROLOL FUMARATE 10 MG/1
10 TABLET, FILM COATED ORAL DAILY
Qty: 90 | Refills: 3 | Status: ACTIVE | COMMUNITY
Start: 1900-01-01 | End: 1900-01-01

## 2024-06-14 RX ORDER — FENOFIBRATE 134 MG/1
134 CAPSULE ORAL
Qty: 90 | Refills: 3 | Status: ACTIVE | COMMUNITY
Start: 1900-01-01 | End: 1900-01-01

## 2024-06-14 RX ORDER — USTEKINUMAB 130 MG/26ML
SOLUTION INTRAVENOUS
Refills: 0 | Status: ACTIVE | COMMUNITY

## 2024-06-14 RX ORDER — AMLODIPINE BESYLATE AND BENAZEPRIL HYDROCHLORIDE 5; 20 MG/1; MG/1
5-20 CAPSULE ORAL
Qty: 180 | Refills: 3 | Status: ACTIVE | COMMUNITY
Start: 1900-01-01 | End: 1900-01-01

## 2024-06-14 NOTE — PHYSICAL EXAM
[de-identified] : well appearing, overweight, no distress [de-identified] : reg, nL s1/s2, no m/r/g [de-identified] : CTA [de-identified] : alert, normal affect, logical conversation

## 2024-06-14 NOTE — REASON FOR VISIT
[FreeTextEntry1] : Decided not to have shoulder surgery.  Wants to address lingering knee pain first.  Following with orthopedics and pain management.  Otherwise, feels well.  No interval cardiac symptoms.  Gained 6 pounds.  Rheumatologist attempting to get off prednisone.  Started Stelara  MPI unremarkable (4/2024).  Labs reviewed: Triglycerides 203 CBC, CMP, lipids otherwise unremarkable

## 2024-06-14 NOTE — DISCUSSION/SUMMARY
[FreeTextEntry1] : Cont Amlodipine-Benazepril. Cont Bisoprolol. Cont HCTZ. Cont Lipitor / Gemfibrozil Weight loss / light exercise. Follow-up 6-months [EKG obtained to assist in diagnosis and management of assessed problem(s)] : EKG obtained to assist in diagnosis and management of assessed problem(s)

## 2024-07-12 ENCOUNTER — APPOINTMENT (OUTPATIENT)
Dept: ORTHOPEDIC SURGERY | Facility: CLINIC | Age: 79
End: 2024-07-12
Payer: MEDICARE

## 2024-07-12 DIAGNOSIS — Z96.652 PRESENCE OF LEFT ARTIFICIAL KNEE JOINT: ICD-10-CM

## 2024-07-12 PROCEDURE — 99213 OFFICE O/P EST LOW 20 MIN: CPT

## 2024-07-15 NOTE — ED ADULT NURSE NOTE - NS ED NURSE DC INFO COMPLEXITY
Pt had recent ED visit, calling to offer PCP follow-up apt (discharged 07/09)    Dr Rebecca Patel  Family Medicine  [address] [place] [id number] [phone]  Pt daughter, Sonia declined apt  Closing encounter  
Simple: Patient demonstrates quick and easy understanding

## 2024-07-26 ENCOUNTER — APPOINTMENT (OUTPATIENT)
Dept: ORTHOPEDIC SURGERY | Facility: CLINIC | Age: 79
End: 2024-07-26

## 2024-09-19 ENCOUNTER — APPOINTMENT (OUTPATIENT)
Facility: CLINIC | Age: 79
End: 2024-09-19
Payer: MEDICARE

## 2024-09-19 DIAGNOSIS — M70.61 TROCHANTERIC BURSITIS, RIGHT HIP: ICD-10-CM

## 2024-09-19 DIAGNOSIS — M70.62 TROCHANTERIC BURSITIS, RIGHT HIP: ICD-10-CM

## 2024-09-19 PROCEDURE — 99213 OFFICE O/P EST LOW 20 MIN: CPT

## 2024-09-19 NOTE — HISTORY OF PRESENT ILLNESS
[de-identified] : f/u of hip and knee has bursitis and abductor tendonitis had an injection elsewhere 2 weeks ago did not improve exam shows ttp over GT and gluteal muscles rec PT moist heat strethching f/u after PT

## 2024-09-30 NOTE — ED ADULT NURSE NOTE - NS PRO AD NO ADVANCE DIRECTIVE
John Chavis  1959  [id number]      HISTORY OF PRESENT ILLNESS:  Mr. Chavis is a 65 y.o. male returns for a follow up visit for pain management  He has a diagnosis of   1. Chronic pain syndrome    2. DDD (degenerative disc disease), lumbar    3. Lumbar radiculitis    4. Postlaminectomy syndrome, lumbar region    5. Lumbar spondylosis    6. Failed neck syndrome    7. Brachial neuritis or radiculitis    8. At risk for respiratory depression due to opioid    9. Primary insomnia    10. Myofascial pain    .      As per information/history obtained from the PADT(patient assessment and documentation tool) -  He complains of pain in the neck, upper back, and lower back with radiation to the shoulders Left, arms Left, elbows Left, hands Left, hips Right, upper leg Right, knees Right, lower leg Right, ankles Right, and feet Right He rates the pain 7/10 and describes it as sharp, aching, burning, numbness.  Pain is made worse by: nothing, movement, walking, standing, sitting, bending, lifting. He denies any side effects from the current pain regimen. Patient reports that since last follow up visit the physical functioning is unchanged, family/social relationships are unchanged, mood is unchanged sleep patterns are unchanged. Mr. Chavis states that since starting the treatment with the current regimen the  overall functioning  in the above aspects is  better, Patient denies misusing/abusing his narcotic pain medications or using any illegal drugs.  There are No indicators for possible drug abuse, addiction or diversion problems. Upon obtaining the medical history from Mr. Chavis regarding today's office visit for his presenting problems, patient states he is having increase pain in the back, he states he is thinking about getting surgery done on the back. Mr. Chavis states his last MRI and Myelogram was done in 2021. He states it is difficult to continue working. Patient complains of her legs feeling numb, 
No

## 2024-10-07 ENCOUNTER — OUTPATIENT (OUTPATIENT)
Dept: OUTPATIENT SERVICES | Facility: HOSPITAL | Age: 79
LOS: 1 days | End: 2024-10-07
Payer: MEDICARE

## 2024-10-07 ENCOUNTER — LABORATORY RESULT (OUTPATIENT)
Age: 79
End: 2024-10-07

## 2024-10-07 ENCOUNTER — APPOINTMENT (OUTPATIENT)
Age: 79
End: 2024-10-07
Payer: MEDICARE

## 2024-10-07 VITALS
DIASTOLIC BLOOD PRESSURE: 75 MMHG | OXYGEN SATURATION: 92 % | HEIGHT: 62 IN | BODY MASS INDEX: 35.51 KG/M2 | SYSTOLIC BLOOD PRESSURE: 145 MMHG | TEMPERATURE: 97.9 F | WEIGHT: 193 LBS | HEART RATE: 81 BPM | RESPIRATION RATE: 16 BRPM

## 2024-10-07 DIAGNOSIS — Z90.49 ACQUIRED ABSENCE OF OTHER SPECIFIED PARTS OF DIGESTIVE TRACT: Chronic | ICD-10-CM

## 2024-10-07 DIAGNOSIS — Z93.2 ILEOSTOMY STATUS: Chronic | ICD-10-CM

## 2024-10-07 DIAGNOSIS — Z90.710 ACQUIRED ABSENCE OF BOTH CERVIX AND UTERUS: Chronic | ICD-10-CM

## 2024-10-07 DIAGNOSIS — I26.99 OTHER PULMONARY EMBOLISM WITHOUT ACUTE COR PULMONALE: ICD-10-CM

## 2024-10-07 DIAGNOSIS — Z98.41 CATARACT EXTRACTION STATUS, RIGHT EYE: Chronic | ICD-10-CM

## 2024-10-07 LAB
HCT VFR BLD CALC: 44.1 %
HGB BLD-MCNC: 14 G/DL
MCHC RBC-ENTMCNC: 29 PG
MCHC RBC-ENTMCNC: 31.7 G/DL
MCV RBC AUTO: 91.5 FL
PLATELET # BLD AUTO: 223 K/UL
PMV BLD: 9 FL
RBC # BLD: 4.82 M/UL
RBC # FLD: 15.8 %
WBC # FLD AUTO: 10.52 K/UL

## 2024-10-07 PROCEDURE — 83540 ASSAY OF IRON: CPT

## 2024-10-07 PROCEDURE — 85027 COMPLETE CBC AUTOMATED: CPT

## 2024-10-07 PROCEDURE — 99213 OFFICE O/P EST LOW 20 MIN: CPT

## 2024-10-07 PROCEDURE — 82728 ASSAY OF FERRITIN: CPT

## 2024-10-07 PROCEDURE — 83550 IRON BINDING TEST: CPT

## 2024-10-08 DIAGNOSIS — I26.99 OTHER PULMONARY EMBOLISM WITHOUT ACUTE COR PULMONALE: ICD-10-CM

## 2024-10-08 LAB
FERRITIN SERPL-MCNC: 73 NG/ML
IRON SATN MFR SERPL: 20 %
IRON SERPL-MCNC: 80 UG/DL
TIBC SERPL-MCNC: 393 UG/DL
UIBC SERPL-MCNC: 313 UG/DL

## 2024-10-15 ENCOUNTER — APPOINTMENT (OUTPATIENT)
Dept: PULMONOLOGY | Facility: CLINIC | Age: 79
End: 2024-10-15
Payer: MEDICARE

## 2024-10-15 ENCOUNTER — NON-APPOINTMENT (OUTPATIENT)
Age: 79
End: 2024-10-15

## 2024-10-15 VITALS
HEART RATE: 83 BPM | SYSTOLIC BLOOD PRESSURE: 140 MMHG | WEIGHT: 186 LBS | BODY MASS INDEX: 34.23 KG/M2 | DIASTOLIC BLOOD PRESSURE: 60 MMHG | HEIGHT: 62 IN | RESPIRATION RATE: 12 BRPM | OXYGEN SATURATION: 94 %

## 2024-10-15 DIAGNOSIS — G47.33 OBSTRUCTIVE SLEEP APNEA (ADULT) (PEDIATRIC): ICD-10-CM

## 2024-10-15 DIAGNOSIS — R06.02 SHORTNESS OF BREATH: ICD-10-CM

## 2024-10-15 DIAGNOSIS — I26.99 OTHER PULMONARY EMBOLISM W/OUT ACUTE COR PULMONALE: ICD-10-CM

## 2024-10-15 PROCEDURE — 94010 BREATHING CAPACITY TEST: CPT

## 2024-10-15 PROCEDURE — 99214 OFFICE O/P EST MOD 30 MIN: CPT | Mod: 25

## 2024-10-15 PROCEDURE — 71046 X-RAY EXAM CHEST 2 VIEWS: CPT

## 2024-10-23 ENCOUNTER — OUTPATIENT (OUTPATIENT)
Dept: OUTPATIENT SERVICES | Facility: HOSPITAL | Age: 79
LOS: 1 days | Discharge: HOME | End: 2024-10-23
Payer: MEDICARE

## 2024-10-23 ENCOUNTER — OUTPATIENT (OUTPATIENT)
Dept: OUTPATIENT SERVICES | Facility: HOSPITAL | Age: 79
LOS: 1 days | End: 2024-10-23
Payer: MEDICARE

## 2024-10-23 ENCOUNTER — APPOINTMENT (OUTPATIENT)
Dept: PULMONOLOGY | Facility: HOSPITAL | Age: 79
End: 2024-10-23

## 2024-10-23 ENCOUNTER — APPOINTMENT (OUTPATIENT)
Dept: SLEEP CENTER | Facility: HOSPITAL | Age: 79
End: 2024-10-23
Payer: MEDICARE

## 2024-10-23 DIAGNOSIS — Z90.49 ACQUIRED ABSENCE OF OTHER SPECIFIED PARTS OF DIGESTIVE TRACT: Chronic | ICD-10-CM

## 2024-10-23 DIAGNOSIS — Z93.2 ILEOSTOMY STATUS: Chronic | ICD-10-CM

## 2024-10-23 DIAGNOSIS — Z98.41 CATARACT EXTRACTION STATUS, RIGHT EYE: Chronic | ICD-10-CM

## 2024-10-23 DIAGNOSIS — Z90.710 ACQUIRED ABSENCE OF BOTH CERVIX AND UTERUS: Chronic | ICD-10-CM

## 2024-10-23 DIAGNOSIS — R06.02 SHORTNESS OF BREATH: ICD-10-CM

## 2024-10-23 DIAGNOSIS — G47.33 OBSTRUCTIVE SLEEP APNEA (ADULT) (PEDIATRIC): ICD-10-CM

## 2024-10-23 PROCEDURE — 94727 GAS DIL/WSHOT DETER LNG VOL: CPT

## 2024-10-23 PROCEDURE — 95806 SLEEP STUDY UNATT&RESP EFFT: CPT

## 2024-10-23 PROCEDURE — 94727 GAS DIL/WSHOT DETER LNG VOL: CPT | Mod: 26

## 2024-10-23 PROCEDURE — 94664 DEMO&/EVAL PT USE INHALER: CPT

## 2024-10-23 PROCEDURE — 94729 DIFFUSING CAPACITY: CPT

## 2024-10-23 PROCEDURE — 95800 SLP STDY UNATTENDED: CPT | Mod: 26

## 2024-10-23 PROCEDURE — 94060 EVALUATION OF WHEEZING: CPT | Mod: 26

## 2024-10-23 PROCEDURE — 94729 DIFFUSING CAPACITY: CPT | Mod: 26

## 2024-10-23 PROCEDURE — 94070 EVALUATION OF WHEEZING: CPT

## 2024-10-24 DIAGNOSIS — R06.02 SHORTNESS OF BREATH: ICD-10-CM

## 2024-10-26 DIAGNOSIS — G47.33 OBSTRUCTIVE SLEEP APNEA (ADULT) (PEDIATRIC): ICD-10-CM

## 2024-11-11 ENCOUNTER — EMERGENCY (EMERGENCY)
Facility: HOSPITAL | Age: 79
LOS: 0 days | Discharge: HOME | End: 2024-11-11
Attending: EMERGENCY MEDICINE
Payer: MEDICARE

## 2024-11-11 VITALS
WEIGHT: 186.07 LBS | TEMPERATURE: 98 F | DIASTOLIC BLOOD PRESSURE: 88 MMHG | RESPIRATION RATE: 18 BRPM | SYSTOLIC BLOOD PRESSURE: 164 MMHG | HEART RATE: 97 BPM | HEIGHT: 62 IN | OXYGEN SATURATION: 99 %
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
## 2024-11-11 DIAGNOSIS — Z90.49 ACQUIRED ABSENCE OF OTHER SPECIFIED PARTS OF DIGESTIVE TRACT: Chronic | ICD-10-CM

## 2024-11-11 DIAGNOSIS — E11.9 TYPE 2 DIABETES MELLITUS WITHOUT COMPLICATIONS: ICD-10-CM

## 2024-11-11 DIAGNOSIS — E78.5 HYPERLIPIDEMIA, UNSPECIFIED: ICD-10-CM

## 2024-11-11 DIAGNOSIS — Z90.710 ACQUIRED ABSENCE OF BOTH CERVIX AND UTERUS: Chronic | ICD-10-CM

## 2024-11-11 DIAGNOSIS — Z86.711 PERSONAL HISTORY OF PULMONARY EMBOLISM: ICD-10-CM

## 2024-11-11 DIAGNOSIS — I10 ESSENTIAL (PRIMARY) HYPERTENSION: ICD-10-CM

## 2024-11-11 DIAGNOSIS — S92.901A UNSPECIFIED FRACTURE OF RIGHT FOOT, INITIAL ENCOUNTER FOR CLOSED FRACTURE: ICD-10-CM

## 2024-11-11 DIAGNOSIS — Z93.2 ILEOSTOMY STATUS: Chronic | ICD-10-CM

## 2024-11-11 DIAGNOSIS — Z88.1 ALLERGY STATUS TO OTHER ANTIBIOTIC AGENTS: ICD-10-CM
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
## 2024-11-11 DIAGNOSIS — Z88.2 ALLERGY STATUS TO SULFONAMIDES: ICD-10-CM

## 2024-11-11 DIAGNOSIS — Z98.41 CATARACT EXTRACTION STATUS, RIGHT EYE: Chronic | ICD-10-CM

## 2024-11-11 PROCEDURE — 99284 EMERGENCY DEPT VISIT MOD MDM: CPT | Mod: 25

## 2024-11-11 PROCEDURE — 93970 EXTREMITY STUDY: CPT

## 2024-11-11 PROCEDURE — 99284 EMERGENCY DEPT VISIT MOD MDM: CPT | Mod: FS

## 2024-11-11 PROCEDURE — 93970 EXTREMITY STUDY: CPT | Mod: 26

## 2024-11-11 NOTE — ED PROVIDER NOTE - PHYSICAL EXAMINATION
VITAL SIGNS: I have reviewed nursing notes and confirm.  CONSTITUTIONAL: Well-developed; well-nourished; in no acute distress.  SKIN: Skin exam is warm and dry, no acute rash.  HEAD: Normocephalic; atraumatic.  EYES: Conjunctiva and sclera clear..  NECK: Supple  CARD:  Regular rate and rhythm.  RESP: Speaking in full sentences.   EXT: R soft cast in place, no calf pain. Capillary refill <2 seconds.  NEURO: Alert, oriented. Grossly unremarkable. No focal deficits.   PSYCH: Cooperative, appropriate.

## 2024-11-11 NOTE — ED PROVIDER NOTE - PATIENT PORTAL LINK FT
You can access the FollowMyHealth Patient Portal offered by [facility] by registering at the following website: [email or web address]. By joining [facility]’s FollowMyHealth portal, you will also be able to view your health information using other applications (apps) compatible with our system.

## 2024-11-11 NOTE — ED PROVIDER NOTE - OBJECTIVE STATEMENT
79 year old female past medical history of HTN, HLD, DM, and PE presenting for rule out DVT sent by MD. Patient states that she saw her doctor today was diagnosis with a R foot fracture after experiencing pain with ambulation. Patient states that due to her hx of clots her doctor wanted her to come to ED for eval. Denies any calf pain, erythema, chest pain shortness of breath.

## 2024-11-11 NOTE — ED PROVIDER NOTE - CLINICAL SUMMARY MEDICAL DECISION MAKING FREE TEXT BOX
Patient sent to rule out DVT, has a foot fracture with swelling as she is on a blood thinner, duplex negative, will discharge

## 2024-11-27 ENCOUNTER — RX RENEWAL (OUTPATIENT)
Age: 79
End: 2024-11-27

## 2024-12-19 NOTE — ED ADULT NURSE NOTE - COVID-19 RESULT
Anesthesia Volume In Cc: 0 Show Anesthesia In Plan?: Yes Illumination Time: 00:30:00 Post-Care Instructions: I reviewed with the patient  in detail post-care instructions. Patient is to avoid sunlight for the next two to three days, and wear sun protection.  Patients may experience sunburn like redness, discomfort and scabbing.   Cleansed after the treatment with light.  Samples of lipkar  and sunscreen given Was Levulan/Ameluz Applied On A Previous Day?: No Treatment Number: 1 Debridement Text (Will Only Render In Visit Note If You Select Debridement Option Under Who Performed The Pdt Field): Prior to application of the photodynamic medication the hyperkeratotic lesions were curetted to make them more amenable to therapy. Incubation Time: 00:00 Who Performed The Pdt?: Performed by Nurse, MA or Aesthetician ([id number]) Pre-Procedure Text: Site was cleansed with acetone prior to levulan application. Anesthesia Type: 1% lidocaine with epinephrine Ndc# (Optional): 87041-702-64 Medical Necessity: Precancerous Lesions Detail Level: Simple Light Source: Jalen-U Which Photosensitizer Was Used: Levulan Who Performed The Pdt (Staff): Kait Consent: Written consent obtained.  The risks were reviewed with the patient including but not limited to : pigmentary changes, pain, blistering, scabbing, redness and remote possibility of scarring. NEGATIVE

## 2025-01-10 ENCOUNTER — APPOINTMENT (OUTPATIENT)
Dept: ORTHOPEDIC SURGERY | Facility: CLINIC | Age: 80
End: 2025-01-10

## 2025-01-10 NOTE — PHYSICAL THERAPY INITIAL EVALUATION ADULT - LEVEL OF INDEPENDENCE: SIT/STAND, REHAB EVAL
Per referral communication     11/26 Pts daughter states that pt is currently on dialysis. When trying to schedule, message states that this cannot be completed with contrast. Please advise, thank you. Routing back to [facility]     - Discitis of lumbar region     - MRI LUMBAR SPINE W WO CONTRAST   - MRI THORACIC SPINE W WO CONTRAST     Please advise if there is a contrast protocol how we need to proceed for imaging.  Thank you.    
supervision

## 2025-01-15 ENCOUNTER — APPOINTMENT (OUTPATIENT)
Dept: PULMONOLOGY | Facility: CLINIC | Age: 80
End: 2025-01-15
Payer: MEDICARE

## 2025-01-15 DIAGNOSIS — I26.99 OTHER PULMONARY EMBOLISM W/OUT ACUTE COR PULMONALE: ICD-10-CM

## 2025-01-15 DIAGNOSIS — R06.02 SHORTNESS OF BREATH: ICD-10-CM

## 2025-01-15 DIAGNOSIS — G47.33 OBSTRUCTIVE SLEEP APNEA (ADULT) (PEDIATRIC): ICD-10-CM

## 2025-01-15 PROCEDURE — 99214 OFFICE O/P EST MOD 30 MIN: CPT

## 2025-03-17 ENCOUNTER — NON-APPOINTMENT (OUTPATIENT)
Age: 80
End: 2025-03-17

## 2025-03-17 DIAGNOSIS — Z84.89 FAMILY HISTORY OF OTHER SPECIFIED CONDITIONS: ICD-10-CM

## 2025-03-17 DIAGNOSIS — Z82.49 FAMILY HISTORY OF ISCHEMIC HEART DISEASE AND OTHER DISEASES OF THE CIRCULATORY SYSTEM: ICD-10-CM

## 2025-03-17 DIAGNOSIS — Z80.1 FAMILY HISTORY OF MALIGNANT NEOPLASM OF TRACHEA, BRONCHUS AND LUNG: ICD-10-CM

## 2025-03-17 DIAGNOSIS — Z85.42 PERSONAL HISTORY OF MALIGNANT NEOPLASM OF OTHER PARTS OF UTERUS: ICD-10-CM

## 2025-03-17 DIAGNOSIS — Z92.89 PERSONAL HISTORY OF OTHER MEDICAL TREATMENT: ICD-10-CM

## 2025-03-17 DIAGNOSIS — Z78.9 OTHER SPECIFIED HEALTH STATUS: ICD-10-CM

## 2025-03-17 RX ORDER — OMEGA-3-ACID ETHYL ESTERS 1 G/1
1 CAPSULE ORAL
Refills: 0 | Status: ACTIVE | COMMUNITY

## 2025-04-21 ENCOUNTER — APPOINTMENT (OUTPATIENT)
Dept: CARDIOLOGY | Facility: CLINIC | Age: 80
End: 2025-04-21
Payer: MEDICARE

## 2025-04-21 ENCOUNTER — NON-APPOINTMENT (OUTPATIENT)
Age: 80
End: 2025-04-21

## 2025-04-21 VITALS
WEIGHT: 186 LBS | DIASTOLIC BLOOD PRESSURE: 66 MMHG | HEART RATE: 85 BPM | BODY MASS INDEX: 34.23 KG/M2 | SYSTOLIC BLOOD PRESSURE: 128 MMHG | HEIGHT: 62 IN

## 2025-04-21 DIAGNOSIS — I10 ESSENTIAL (PRIMARY) HYPERTENSION: ICD-10-CM

## 2025-04-21 DIAGNOSIS — E78.5 HYPERLIPIDEMIA, UNSPECIFIED: ICD-10-CM

## 2025-04-21 PROCEDURE — 93000 ELECTROCARDIOGRAM COMPLETE: CPT

## 2025-04-21 PROCEDURE — 99214 OFFICE O/P EST MOD 30 MIN: CPT

## 2025-04-21 RX ORDER — PIOGLITAZONE HYDROCHLORIDE 15 MG/1
15 TABLET ORAL
Refills: 0 | Status: ACTIVE | COMMUNITY

## 2025-05-05 ENCOUNTER — OUTPATIENT (OUTPATIENT)
Dept: OUTPATIENT SERVICES | Facility: HOSPITAL | Age: 80
LOS: 1 days | End: 2025-05-05
Payer: MEDICARE

## 2025-05-05 ENCOUNTER — APPOINTMENT (OUTPATIENT)
Age: 80
End: 2025-05-05

## 2025-05-05 DIAGNOSIS — Z90.49 ACQUIRED ABSENCE OF OTHER SPECIFIED PARTS OF DIGESTIVE TRACT: Chronic | ICD-10-CM

## 2025-05-05 DIAGNOSIS — Z98.41 CATARACT EXTRACTION STATUS, RIGHT EYE: Chronic | ICD-10-CM

## 2025-05-05 DIAGNOSIS — Z93.2 ILEOSTOMY STATUS: Chronic | ICD-10-CM

## 2025-05-05 DIAGNOSIS — I26.99 OTHER PULMONARY EMBOLISM WITHOUT ACUTE COR PULMONALE: ICD-10-CM

## 2025-05-05 DIAGNOSIS — Z90.710 ACQUIRED ABSENCE OF BOTH CERVIX AND UTERUS: Chronic | ICD-10-CM

## 2025-05-05 LAB
AUTO BASOPHILS #: 0.11 K/UL
AUTO BASOPHILS %: 1 %
AUTO EOSINOPHILS #: 0.18 K/UL
AUTO EOSINOPHILS %: 1.6 %
AUTO IMMATURE GRANULOCYTES #: 0.27 K/UL
AUTO LYMPHOCYTES #: 1.14 K/UL
AUTO LYMPHOCYTES %: 10 %
AUTO MONOCYTES #: 0.87 K/UL
AUTO MONOCYTES %: 7.7 %
AUTO NEUTROPHILS #: 8.78 K/UL
AUTO NEUTROPHILS %: 77.3 %
AUTO NRBC #: 0 K/UL
HCT VFR BLD CALC: 43.1 %
HGB BLD-MCNC: 13.7 G/DL
IMM GRANULOCYTES NFR BLD AUTO: 2.4 %
MAN DIFF?: NORMAL
MCHC RBC-ENTMCNC: 29 PG
MCHC RBC-ENTMCNC: 31.8 G/DL
MCV RBC AUTO: 91.3 FL
PLATELET # BLD AUTO: 221 K/UL
PMV BLD AUTO: 0 /100 WBCS
PMV BLD: 9.6 FL
RBC # BLD: 4.72 M/UL
RBC # FLD: 15.7 %
WBC # FLD AUTO: 11.35 K/UL

## 2025-05-05 PROCEDURE — 85025 COMPLETE CBC W/AUTO DIFF WBC: CPT

## 2025-05-05 PROCEDURE — 36415 COLL VENOUS BLD VENIPUNCTURE: CPT

## 2025-05-05 PROCEDURE — 82728 ASSAY OF FERRITIN: CPT

## 2025-05-06 DIAGNOSIS — I26.99 OTHER PULMONARY EMBOLISM WITHOUT ACUTE COR PULMONALE: ICD-10-CM

## 2025-05-07 LAB — FERRITIN SERPL-MCNC: 50 NG/ML
